# Patient Record
Sex: FEMALE | Race: WHITE | Employment: OTHER | URBAN - METROPOLITAN AREA
[De-identification: names, ages, dates, MRNs, and addresses within clinical notes are randomized per-mention and may not be internally consistent; named-entity substitution may affect disease eponyms.]

---

## 2017-01-09 ENCOUNTER — HOSPITAL ENCOUNTER (OUTPATIENT)
Dept: LAB | Age: 52
Discharge: HOME | End: 2017-01-09
Payer: COMMERCIAL

## 2017-01-09 LAB
ALBUMIN SERPL BCP-MCNC: 3.7 G/DL (ref 3.4–5)
ALBUMIN/GLOB SERPL: 1.3 {RATIO} (ref 0.8–1.7)
ALP SERPL-CCNC: 123 U/L (ref 45–117)
ALT SERPL-CCNC: 24 U/L (ref 13–56)
ANION GAP BLD CALC-SCNC: 8 MMOL/L (ref 3–18)
AST SERPL W P-5'-P-CCNC: 11 U/L (ref 15–37)
BILIRUB SERPL-MCNC: 0.4 MG/DL (ref 0.2–1)
BUN SERPL-MCNC: 11 MG/DL (ref 7–18)
BUN/CREAT SERPL: 17 (ref 12–20)
CALCIUM SERPL-MCNC: 8.8 MG/DL (ref 8.5–10.1)
CHLORIDE SERPL-SCNC: 105 MMOL/L (ref 100–108)
CO2 SERPL-SCNC: 27 MMOL/L (ref 21–32)
CREAT SERPL-MCNC: 0.64 MG/DL (ref 0.6–1.3)
ERYTHROCYTE [SEDIMENTATION RATE] IN BLOOD: 13 MM/HR (ref 0–20)
GLOBULIN SER CALC-MCNC: 2.8 G/DL (ref 2–4)
GLUCOSE SERPL-MCNC: 98 MG/DL (ref 74–99)
HBA1C MFR BLD: 6.3 % (ref 4.2–5.6)
POTASSIUM SERPL-SCNC: 4.1 MMOL/L (ref 3.5–5.5)
PROT SERPL-MCNC: 6.5 G/DL (ref 6.4–8.2)
SODIUM SERPL-SCNC: 140 MMOL/L (ref 136–145)

## 2017-01-09 PROCEDURE — 82977 ASSAY OF GGT: CPT | Performed by: FAMILY MEDICINE

## 2017-01-09 PROCEDURE — 86038 ANTINUCLEAR ANTIBODIES: CPT | Performed by: FAMILY MEDICINE

## 2017-01-09 PROCEDURE — 36415 COLL VENOUS BLD VENIPUNCTURE: CPT | Performed by: FAMILY MEDICINE

## 2017-01-09 PROCEDURE — 85652 RBC SED RATE AUTOMATED: CPT | Performed by: FAMILY MEDICINE

## 2017-01-09 PROCEDURE — 83036 HEMOGLOBIN GLYCOSYLATED A1C: CPT | Performed by: FAMILY MEDICINE

## 2017-01-09 PROCEDURE — 82306 VITAMIN D 25 HYDROXY: CPT | Performed by: FAMILY MEDICINE

## 2017-01-09 PROCEDURE — 80053 COMPREHEN METABOLIC PANEL: CPT | Performed by: FAMILY MEDICINE

## 2017-01-10 DIAGNOSIS — J45.909 UNCOMPLICATED ASTHMA: ICD-10-CM

## 2017-01-10 DIAGNOSIS — I10 ESSENTIAL HYPERTENSION: ICD-10-CM

## 2017-01-10 LAB
ANA SER QL: NEGATIVE
GGT SERPL-CCNC: 15 IU/L (ref 0–60)
SEE BELOW:, 164879: NORMAL

## 2017-01-10 RX ORDER — VALSARTAN 320 MG/1
320 TABLET ORAL DAILY
Qty: 90 TAB | Refills: 1 | Status: SHIPPED | OUTPATIENT
Start: 2017-01-10 | End: 2017-07-20 | Stop reason: SDUPTHER

## 2017-01-10 RX ORDER — MONTELUKAST SODIUM 10 MG/1
10 TABLET ORAL DAILY
Qty: 90 TAB | Refills: 3 | Status: SHIPPED | OUTPATIENT
Start: 2017-01-10 | End: 2017-12-21 | Stop reason: SDUPTHER

## 2017-01-10 NOTE — TELEPHONE ENCOUNTER
From: Violette Moreno  To:  Ladan Calhoun MD  Sent: 1/10/2017 5:44 AM EST  Subject: Medication Renewal Request    Original authorizing provider: MD Violette Lau would like a refill of the following medications:  montelukast (SINGULAIR) 10 mg tablet Ladan Calhoun MD]  valsartan (DIOVAN) 320 mg tablet Ladan Calhoun MD]    Preferred pharmacy: [address] [place] [address]    Comment:  Refill request to [address] [place] [address]

## 2017-01-11 LAB — 25(OH)D3 SERPL-MCNC: 14.4 NG/ML (ref 30–100)

## 2017-01-16 RX ORDER — ERGOCALCIFEROL 1.25 MG/1
50000 CAPSULE ORAL
Qty: 12 CAP | Refills: 3 | Status: SHIPPED | OUTPATIENT
Start: 2017-01-16 | End: 2017-12-21 | Stop reason: SDUPTHER

## 2017-01-16 NOTE — PROGRESS NOTES
+ vit d deficiency, erx 50k weekly. Prediabetes is persistent, cont to work on diet/wt loss. Inflammatory arthritis screen is neg. pls notify pt.  Will discuss in detail on next appt 2/20/17

## 2017-01-27 NOTE — PROGRESS NOTES
Patient identified with 2 identifiers (name and ). Patient aware of + vit d deficiency and Vit D 50k e scribed to pharmacy. Patient aware that the pre diabetes is persistent and patient was advised to work on diet and weight loss. Patein aware that inflammatory arthritis screen is negative.

## 2017-01-30 NOTE — TELEPHONE ENCOUNTER
This patient contacted office for the following prescriptions to be filled:    Medication requested :   Requested Prescriptions     Pending Prescriptions Disp Refills    amLODIPine (NORVASC) 5 mg tablet 90 Tab 1     Sig: Take 1 Tab by mouth daily.        PCP: perla  Pharmacy or Print: On Site   Mail order or Local pharmacy [address]    Scheduled appointment if not seen by current providers in office: LOV 11/8/2017 f/u 2/20/2017

## 2017-01-31 RX ORDER — AMLODIPINE BESYLATE 5 MG/1
5 TABLET ORAL DAILY
Qty: 90 TAB | Refills: 1 | Status: SHIPPED | OUTPATIENT
Start: 2017-01-31 | End: 2017-08-04 | Stop reason: SDUPTHER

## 2017-02-20 ENCOUNTER — OFFICE VISIT (OUTPATIENT)
Dept: FAMILY MEDICINE CLINIC | Age: 52
End: 2017-02-20

## 2017-02-20 VITALS
TEMPERATURE: 98.2 F | OXYGEN SATURATION: 98 % | HEART RATE: 70 BPM | WEIGHT: 293 LBS | BODY MASS INDEX: 44.41 KG/M2 | SYSTOLIC BLOOD PRESSURE: 134 MMHG | DIASTOLIC BLOOD PRESSURE: 86 MMHG | HEIGHT: 68 IN | RESPIRATION RATE: 16 BRPM

## 2017-02-20 DIAGNOSIS — E78.5 HYPERLIPIDEMIA WITH TARGET LDL LESS THAN 100: ICD-10-CM

## 2017-02-20 DIAGNOSIS — I10 ESSENTIAL HYPERTENSION: Primary | ICD-10-CM

## 2017-02-20 DIAGNOSIS — E55.9 VITAMIN D DEFICIENCY: ICD-10-CM

## 2017-02-20 DIAGNOSIS — F32.0 MILD SINGLE CURRENT EPISODE OF MAJOR DEPRESSIVE DISORDER (HCC): ICD-10-CM

## 2017-02-20 DIAGNOSIS — J45.20 MILD INTERMITTENT ASTHMA WITHOUT COMPLICATION: ICD-10-CM

## 2017-02-20 DIAGNOSIS — Z12.11 COLON CANCER SCREENING: ICD-10-CM

## 2017-02-20 RX ORDER — SERTRALINE HYDROCHLORIDE 100 MG/1
100 TABLET, FILM COATED ORAL DAILY
Qty: 90 TAB | Refills: 1 | Status: SHIPPED | OUTPATIENT
Start: 2017-02-20 | End: 2017-09-06 | Stop reason: SDUPTHER

## 2017-02-20 NOTE — MR AVS SNAPSHOT
Visit Information Date & Time Provider Department Dept. Phone Encounter #  
 2/20/2017  1:15 PM Noé Funez, [address] [id number] Follow-up Instructions Return in about 3 months (around 5/20/2017), or if symptoms worsen or fail to improve. Upcoming Health Maintenance Date Due  
 PAP AKA CERVICAL CYTOLOGY 7/1/2016 BREAST CANCER SCRN MAMMOGRAM 11/4/2016 FOBT Q 1 YEAR AGE 50-75 11/4/2016 DTaP/Tdap/Td series (2 - Td) 7/25/2026 Allergies as of 2/20/2017  Review Complete On: 2/20/2017 By: Noé Funez MD  
  
 Severity Noted Reaction Type Reactions Entex [Phenylephrine-guaifenesin]  08/10/2010    Unknown (comments) Simvastatin  03/20/2013    Myalgia Current Immunizations  Reviewed on 11/8/2016 Name Date Influenza Vaccine (Quad) PF 11/8/2016 Not reviewed this visit You Were Diagnosed With   
  
 Codes Comments Essential hypertension    -  Primary ICD-10-CM: I10 
ICD-9-CM: 401.9 Hyperlipidemia with target LDL less than 100     ICD-10-CM: E78.5 ICD-9-CM: 272.4 Mild single current episode of major depressive disorder (HCC)     ICD-10-CM: F32.0 ICD-9-CM: 296.21 Vitamin D deficiency     ICD-10-CM: E55.9 ICD-9-CM: 268.9 Mild intermittent asthma without complication     YGH-99-JY: J45.20 ICD-9-CM: 493.90 Colon cancer screening     ICD-10-CM: Z12.11 ICD-9-CM: V76.51 Vitals BP Pulse Temp Resp Height(growth percentile) Weight(growth percentile) 134/86 (BP 1 Location: Left arm, BP Patient Position: Sitting) 70 98.2 °F (36.8 °C) (Oral) 16 5' 8\" (1.727 m) 295 lb (133.8 kg) SpO2 BMI OB Status Smoking Status 98% 44.85 kg/m2 Having regular periods Never Smoker Vitals History BMI and BSA Data Body Mass Index Body Surface Area 44.85 kg/m 2 2.53 m 2 Preferred Pharmacy Pharmacy Name Phone ON-SITE  - [place], [address] [facility] [phone] Your Updated Medication List  
  
   
This list is accurate as of: 2/20/17  1:45 PM.  Always use your most recent med list.  
  
  
  
  
 albuterol 90 mcg/actuation inhaler Commonly known as:  PROVENTIL HFA, VENTOLIN HFA, PROAIR HFA Take 1-2 Puffs by inhalation every four (4) hours as needed for Wheezing or Shortness of Breath. amLODIPine 5 mg tablet Commonly known as:  Elspeth Bakes Take 1 Tab by mouth daily. B.infantis-B.ani-B.long-B.bifi 10-15 mg Tbec Take  by mouth. * cholecalciferol 1,000 unit tablet Commonly known as:  VITAMIN D3 Take  by mouth daily. * VITAMIN D3 1,000 unit Cap Generic drug:  cholecalciferol Take 1,000 Units by mouth daily. ergocalciferol 50,000 unit capsule Commonly known as:  ERGOCALCIFEROL Take 1 Cap by mouth every seven (7) days. fluticasone 50 mcg/actuation nasal spray Commonly known as:  Shelvia Birks 2 Sprays by Both Nostrils route daily. fluticasone-Salmeterol 45-21 mcg/actuation inhaler Commonly known as:  ADVAIR HFA Take 2 Puffs by inhalation two (2) times a day. ibuprofen 200 mg tablet Commonly known as:  MOTRIN Take  by mouth every six (6) hours as needed. Iron 325 mg (65 mg iron) tablet Generic drug:  ferrous sulfate Take 325 mg by mouth Three (3) times a week. montelukast 10 mg tablet Commonly known as:  SINGULAIR Take 1 Tab by mouth daily. MULTIVITAMIN PO Take 2 Tabs by mouth daily. omeprazole 20 mg capsule Commonly known as:  PRILOSEC Take 20 mg by mouth daily. POTASSIUM-CALCIUM-MAGNESIUM PO Take  by mouth. sertraline 100 mg tablet Commonly known as:  ZOLOFT Take 1 Tab by mouth daily. valsartan 320 mg tablet Commonly known as:  DIOVAN Take 1 Tab by mouth daily. VITAMIN B-12 SL  
by SubLINGual route. ZYRTEC PO Take  by mouth daily. * Notice: This list has 2 medication(s) that are the same as other medications prescribed for you. Read the directions carefully, and ask your doctor or other care provider to review them with you. Follow-up Instructions Return in about 3 months (around 5/20/2017), or if symptoms worsen or fail to improve. To-Do List   
 02/20/2017 Lab:  METABOLIC PANEL, COMPREHENSIVE   
  
 02/20/2017 Lab:  OCCULT BLOOD, IMMUNOASSAY (FIT)   
  
 02/20/2017 Lab:  VITAMIN D, 25 HYDROXY Patient Instructions DASH Diet: Care Instructions Your Care Instructions The DASH diet is an eating plan that can help lower your blood pressure. DASH stands for Dietary Approaches to Stop Hypertension. Hypertension is high blood pressure. The DASH diet focuses on eating foods that are high in calcium, potassium, and magnesium. These nutrients can lower blood pressure. The foods that are highest in these nutrients are fruits, vegetables, low-fat dairy products, nuts, seeds, and legumes. But taking calcium, potassium, and magnesium supplements instead of eating foods that are high in those nutrients does not have the same effect. The DASH diet also includes whole grains, fish, and poultry. The DASH diet is one of several lifestyle changes your doctor may recommend to lower your high blood pressure. Your doctor may also want you to decrease the amount of sodium in your diet. Lowering sodium while following the DASH diet can lower blood pressure even further than just the DASH diet alone. Follow-up care is a key part of your treatment and safety. Be sure to make and go to all appointments, and call your doctor if you are having problems. It's also a good idea to know your test results and keep a list of the medicines you take. How can you care for yourself at home? Following the DASH diet · Eat 4 to 5 servings of fruit each day.  A serving is 1 medium-sized piece of fruit, ½ cup chopped or canned fruit, 1/4 cup dried fruit, or 4 ounces (½ cup) of fruit juice. Choose fruit more often than fruit juice. · Eat 4 to 5 servings of vegetables each day. A serving is 1 cup of lettuce or raw leafy vegetables, ½ cup of chopped or cooked vegetables, or 4 ounces (½ cup) of vegetable juice. Choose vegetables more often than vegetable juice. · Get 2 to 3 servings of low-fat and fat-free dairy each day. A serving is 8 ounces of milk, 1 cup of yogurt, or 1 ½ ounces of cheese. · Eat 6 to 8 servings of grains each day. A serving is 1 slice of bread, 1 ounce of dry cereal, or ½ cup of cooked rice, pasta, or cooked cereal. Try to choose whole-grain products as much as possible. · Limit lean meat, poultry, and fish to 2 servings each day. A serving is 3 ounces, about the size of a deck of cards. · Eat 4 to 5 servings of nuts, seeds, and legumes (cooked dried beans, lentils, and split peas) each week. A serving is 1/3 cup of nuts, 2 tablespoons of seeds, or ½ cup of cooked beans or peas. · Limit fats and oils to 2 to 3 servings each day. A serving is 1 teaspoon of vegetable oil or 2 tablespoons of salad dressing. · Limit sweets and added sugars to 5 servings or less a week. A serving is 1 tablespoon jelly or jam, ½ cup sorbet, or 1 cup of lemonade. · Eat less than 2,300 milligrams (mg) of sodium a day. If you limit your sodium to 1,500 mg a day, you can lower your blood pressure even more. Tips for success · Start small. Do not try to make dramatic changes to your diet all at once. You might feel that you are missing out on your favorite foods and then be more likely to not follow the plan. Make small changes, and stick with them. Once those changes become habit, add a few more changes. · Try some of the following: ¨ Make it a goal to eat a fruit or vegetable at every meal and at snacks. This will make it easy to get the recommended amount of fruits and vegetables each day. ¨ Try yogurt topped with fruit and nuts for a snack or healthy dessert. ¨ Add lettuce, tomato, cucumber, and onion to sandwiches. ¨ Combine a ready-made pizza crust with low-fat mozzarella cheese and lots of vegetable toppings. Try using tomatoes, squash, spinach, broccoli, carrots, cauliflower, and onions. ¨ Have a variety of cut-up vegetables with a low-fat dip as an appetizer instead of chips and dip. ¨ Sprinkle sunflower seeds or chopped almonds over salads. Or try adding chopped walnuts or almonds to cooked vegetables. ¨ Try some vegetarian meals using beans and peas. Add garbanzo or kidney beans to salads. Make burritos and tacos with mashed ace beans or black beans. Where can you learn more? Go to http://loOverflow Cafejennie.info/. Enter T063 in the search box to learn more about \"DASH Diet: Care Instructions. \" Current as of: March 23, 2016 Content Version: 11.1 © 8676-1998 [employer]. Care instructions adapted under license by [employer] (which disclaims liability or warranty for this information). If you have questions about a medical condition or this instruction, always ask your healthcare professional. [address] any warranty or liability for your use of this information. Introducing [facility]! Dear Frida Licea: Thank you for requesting a MyCadbox account. Our records indicate that you already have an active MyCadbox account. You can access your account anytime at [email or web address] Did you know that you can access your hospital and ER discharge instructions at any time in MyCadbox? You can also review all of your test results from your hospital stay or ER visit. Additional Information If you have questions, please visit the Frequently Asked Questions section of the MyCadbox website at [email or web address] Remember, MyCadbox is NOT to be used for urgent needs.  For medical emergencies, dial 911. Now available from your iPhone and Android! Please provide this summary of care documentation to your next provider. Your primary care clinician is listed as Ludin Fink. If you have any questions after today's visit, please call [phone].

## 2017-02-20 NOTE — PROGRESS NOTES
Chief Complaint   Patient presents with    Hypertension    Cholesterol Problem    Depression     1. Have you been to the ER, urgent care clinic since your last visit? Hospitalized since your last visit? No    2. Have you seen or consulted any other health care providers outside of the [address] [place] [address] since your last visit? Include any pap smears or colon screening.  No

## 2017-02-20 NOTE — PROGRESS NOTES
Raad Crane, 49 y.o.,  female    SUBJECTIVE  Ff-up      Depression- says taking 100 mg zoloft qday  and says feels good/stable on this dose. HTN- on norvasc/diovan without problems    Dysphagia- did not follow up with GLS, says this has improved. She has h/o GERD on PPI, EGD Reviewed essentially normal, bx neg. Continues to see GLS. asthma is doing well on advair, hardly uses her albuterol. On singulair and zyrtec as well for allergies. Per pt utd with gyne care with dr. Bill Blount, due for mammogram already ordered by gyne scheduled for this week Friday 2/24/17    H/o  +HL, tried simvastatin and red yeast rice previously, difficulty taking these meds. Reviewed labs vit d def, she is currently on 50k weekly replacement/prediabetes      ROS:  See HPI, all others negative        Patient Active Problem List   Diagnosis Code    HTN (hypertension) I10    Hyperlipidemia with target LDL less than 100 E78.5    Allergic rhinitis J30.9    Depression F32.9    S/P gastric bypass Z98.84    Anxiety F41.9    Iron deficiency E61.1    Vitamin D deficiency E55.9    Irregular menses N92.6    Vertigo R42    Onychomycosis B35.1    Impaired glucose tolerance R73.02    Rosacea L71.9    Gastroesophageal reflux disease without esophagitis K21.9    Need for Tdap vaccination Z23    Asthma J45.909       Current Outpatient Prescriptions   Medication Sig Dispense Refill    amLODIPine (NORVASC) 5 mg tablet Take 1 Tab by mouth daily. 90 Tab 1    ergocalciferol (ERGOCALCIFEROL) 50,000 unit capsule Take 1 Cap by mouth every seven (7) days. 12 Cap 3    montelukast (SINGULAIR) 10 mg tablet Take 1 Tab by mouth daily. 90 Tab 3    valsartan (DIOVAN) 320 mg tablet Take 1 Tab by mouth daily. 90 Tab 1    ferrous sulfate (IRON) 325 mg (65 mg iron) tablet Take 325 mg by mouth Three (3) times a week.  POTASSIUM-CALCIUM-MAGNESIUM PO Take  by mouth.       fluticasone-Salmeterol (ADVAIR HFA) 45-21 mcg/actuation inhaler Take 2 Puffs by inhalation two (2) times a day. 3 Inhaler 1    B.infantis-B.ani-B.long-B.bifi 10-15 mg TbEC Take  by mouth.  sertraline (ZOLOFT) 100 mg tablet Take 1 Tab by mouth daily. 90 Tab 1    omeprazole (PRILOSEC) 20 mg capsule Take 20 mg by mouth daily.  albuterol (PROVENTIL HFA, VENTOLIN HFA, PROAIR HFA) 90 mcg/actuation inhaler Take 1-2 Puffs by inhalation every four (4) hours as needed for Wheezing or Shortness of Breath. 3 Inhaler 3    fluticasone (FLONASE) 50 mcg/actuation nasal spray 2 Sprays by Both Nostrils route daily. 3 Bottle 3    CETIRIZINE HCL (ZYRTEC PO) Take  by mouth daily.  ibuprofen (MOTRIN) 200 mg tablet Take  by mouth every six (6) hours as needed.  MULTIVITAMIN PO Take 2 Tabs by mouth daily.  CYANOCOBALAMIN (VITAMIN B-12 SL) by SubLINGual route.  cholecalciferol (VITAMIN D3) 1,000 unit cap Take 1,000 Units by mouth daily.  cholecalciferol (VITAMIN D3) 1,000 unit tablet Take  by mouth daily. Allergies   Allergen Reactions    Entex [Phenylephrine-Guaifenesin] Unknown (comments)    Simvastatin Myalgia       Past Medical History   Diagnosis Date    Asthma     Depression      Anxiety    GERD (gastroesophageal reflux disease)     Hypercholesterolemia     Hypertension        Social History     Social History    Marital status:      Spouse name: N/A    Number of children: N/A    Years of education: N/A     Occupational History    Not on file.      Social History Main Topics    Smoking status: Never Smoker    Smokeless tobacco: Never Used    Alcohol use Yes      Comment: 2 drinks per month    Drug use: No    Sexual activity: Not Currently     Partners: Male     Other Topics Concern    Not on file     Social History Narrative       Family History   Problem Relation Age of Onset    Asthma Mother     High Cholesterol Mother     Hypertension Mother     Thyroid Disease Mother     Cancer Mother 79     Thyroid Cancer    Depression Father     High Cholesterol Father     Hypertension Father     Colon Polyps Father     Depression Brother     High Cholesterol Brother     Hypertension Brother     Breast Cancer Maternal Grandmother     High Cholesterol Maternal Grandmother     Hypertension Maternal Grandmother     Diabetes Maternal Grandmother     Cancer Maternal Grandfather      prostate    High Cholesterol Maternal Grandfather     Hypertension Maternal Grandfather     Diabetes Maternal Grandfather     High Cholesterol Paternal Grandmother     Hypertension Paternal Grandmother     Diabetes Paternal Grandmother     Thyroid Disease Paternal Grandmother     High Cholesterol Paternal Grandfather     Hypertension Paternal Grandfather     Diabetes Paternal Grandfather          OBJECTIVE    Physical Exam:     Visit Vitals    /86 (BP 1 Location: Left arm, BP Patient Position: Sitting)    Pulse 70    Temp 98.2 °F (36.8 °C) (Oral)    Resp 16    Ht 5' 8\" (1.727 m)    Wt 295 lb (133.8 kg)    SpO2 98%    BMI 44.85 kg/m2       General: alert, obese,  in no apparent distress or pain  HEENT: throat, pharynx normal, nasal mucosa, TMs normal.   CVS: normal rate, regular rhythm, distinct S1 and S2  Lungs:clear to ausculation bilaterally, no crackles, wheezing or rhonchi noted  Skin: warm, no lesions, rashes noted  Psych:  mood and affect normal        ASSESSMENT/PLAN  Johana Salgado was seen today for other, hypertension, anxiety and depression. Diagnoses and all orders for this visit:    Essential hypertension-  Controlled, cont both diovan and norvasc.        Hyperlipidemia  SE with previous statins, CV risk cacluated at 2.6%   Discussed low fat diet      S/P gastric bypass       vit d def  On replacement  Recheck vit d in 3 months    Depression  Stable, cont 100 mg zoloft qd    Allergic rhinitis, unspecified allergic rhinitis type  Cont singulair/zyrtec    Gastroesophageal reflux disease without esophagitis-  Stable, Cont PPI    Asthma, mild intermittent, uncomplicated  Controlled, cont advair, prn albuterol  Declines PCV vaccine, annual flu vaccine UTD    Colon ca screening  Discussed options, prefers FIT test, ordered today    Prediabetes  TLCs, monitoring      Follow-up Disposition:  Return in about 3 months (around 5/20/2017), or if symptoms worsen or fail to improve. Patient understands plan of care. Patient has provided input and agrees with goals.

## 2017-02-20 NOTE — PATIENT INSTRUCTIONS

## 2017-05-23 RX ORDER — FLUTICASONE PROPIONATE 50 MCG
2 SPRAY, SUSPENSION (ML) NASAL DAILY
Qty: 3 BOTTLE | Refills: 3 | Status: SHIPPED | OUTPATIENT
Start: 2017-05-23 | End: 2018-07-16 | Stop reason: SDUPTHER

## 2017-05-23 NOTE — TELEPHONE ENCOUNTER
This pharmacy faxed over request for the following prescriptions to be filled:    Medication requested :   Requested Prescriptions     Pending Prescriptions Disp Refills    fluticasone (FLONASE) 50 mcg/actuation nasal spray 3 Bottle 3     Si Sprays by Both Nostrils route daily. PCP: Doris Barrientos or Print: On-Site   Mail order or Local pharmacy [address] [facility],   [address] [facility]    Scheduled appointment if not seen by current providers in office: LOV 2017 No f/u up Scheduled at this time. LMOV to schedule a f/u .  Due 2017

## 2017-07-06 ENCOUNTER — HOSPITAL ENCOUNTER (OUTPATIENT)
Dept: LAB | Age: 52
Discharge: HOME | End: 2017-07-06
Payer: COMMERCIAL

## 2017-07-06 LAB
25(OH)D3 SERPL-MCNC: 26.6 NG/ML (ref 30–100)
ALBUMIN SERPL BCP-MCNC: 3.6 G/DL (ref 3.4–5)
ALBUMIN/GLOB SERPL: 1.4 {RATIO} (ref 0.8–1.7)
ALP SERPL-CCNC: 117 U/L (ref 45–117)
ALT SERPL-CCNC: 35 U/L (ref 13–56)
ANION GAP BLD CALC-SCNC: 8 MMOL/L (ref 3–18)
AST SERPL W P-5'-P-CCNC: 16 U/L (ref 15–37)
BILIRUB SERPL-MCNC: 0.5 MG/DL (ref 0.2–1)
BUN SERPL-MCNC: 16 MG/DL (ref 7–18)
BUN/CREAT SERPL: 25 (ref 12–20)
CALCIUM SERPL-MCNC: 8.8 MG/DL (ref 8.5–10.1)
CHLORIDE SERPL-SCNC: 106 MMOL/L (ref 100–108)
CHOLEST SERPL-MCNC: 254 MG/DL
CO2 SERPL-SCNC: 27 MMOL/L (ref 21–32)
CREAT SERPL-MCNC: 0.65 MG/DL (ref 0.6–1.3)
GLOBULIN SER CALC-MCNC: 2.6 G/DL (ref 2–4)
GLUCOSE SERPL-MCNC: 103 MG/DL (ref 74–99)
HDLC SERPL-MCNC: 47 MG/DL (ref 40–60)
HDLC SERPL: 5.4 {RATIO} (ref 0–5)
LDLC SERPL CALC-MCNC: 159.4 MG/DL (ref 0–100)
LIPID PROFILE,FLP: ABNORMAL
POTASSIUM SERPL-SCNC: 4.1 MMOL/L (ref 3.5–5.5)
PROT SERPL-MCNC: 6.2 G/DL (ref 6.4–8.2)
SODIUM SERPL-SCNC: 141 MMOL/L (ref 136–145)
TRIGL SERPL-MCNC: 238 MG/DL (ref ?–150)
VLDLC SERPL CALC-MCNC: 47.6 MG/DL

## 2017-07-06 PROCEDURE — 80053 COMPREHEN METABOLIC PANEL: CPT | Performed by: FAMILY MEDICINE

## 2017-07-06 PROCEDURE — 80061 LIPID PANEL: CPT | Performed by: FAMILY MEDICINE

## 2017-07-06 PROCEDURE — 36415 COLL VENOUS BLD VENIPUNCTURE: CPT | Performed by: FAMILY MEDICINE

## 2017-07-06 PROCEDURE — 82306 VITAMIN D 25 HYDROXY: CPT | Performed by: FAMILY MEDICINE

## 2017-07-20 ENCOUNTER — OFFICE VISIT (OUTPATIENT)
Dept: FAMILY MEDICINE CLINIC | Age: 52
End: 2017-07-20

## 2017-07-20 VITALS
HEIGHT: 68 IN | TEMPERATURE: 97.7 F | RESPIRATION RATE: 16 BRPM | OXYGEN SATURATION: 97 % | HEART RATE: 71 BPM | DIASTOLIC BLOOD PRESSURE: 86 MMHG | SYSTOLIC BLOOD PRESSURE: 122 MMHG | BODY MASS INDEX: 44.41 KG/M2 | WEIGHT: 293 LBS

## 2017-07-20 DIAGNOSIS — R73.02 IMPAIRED GLUCOSE TOLERANCE: ICD-10-CM

## 2017-07-20 DIAGNOSIS — Z98.84 S/P GASTRIC BYPASS: ICD-10-CM

## 2017-07-20 DIAGNOSIS — F32.0 MILD SINGLE CURRENT EPISODE OF MAJOR DEPRESSIVE DISORDER (HCC): Primary | ICD-10-CM

## 2017-07-20 DIAGNOSIS — E55.9 VITAMIN D DEFICIENCY: ICD-10-CM

## 2017-07-20 DIAGNOSIS — J45.20 MILD INTERMITTENT ASTHMA WITHOUT COMPLICATION: ICD-10-CM

## 2017-07-20 DIAGNOSIS — Z12.11 COLON CANCER SCREENING: ICD-10-CM

## 2017-07-20 DIAGNOSIS — Z83.49 FAMILY HISTORY OF THYROID DISEASE: ICD-10-CM

## 2017-07-20 DIAGNOSIS — I10 ESSENTIAL HYPERTENSION: ICD-10-CM

## 2017-07-20 DIAGNOSIS — E78.9 BORDERLINE HIGH CHOLESTEROL: ICD-10-CM

## 2017-07-20 RX ORDER — VALSARTAN 320 MG/1
320 TABLET ORAL DAILY
Qty: 90 TAB | Refills: 1 | Status: SHIPPED | OUTPATIENT
Start: 2017-07-20 | End: 2018-01-10 | Stop reason: SDUPTHER

## 2017-07-20 NOTE — MR AVS SNAPSHOT
Visit Information Date & Time Provider Department Dept. Phone Encounter #  
 7/20/2017  8:45 AM Jonny Mullins, [address] [id number] Follow-up Instructions Return in about 5 months (around 12/20/2017), or if symptoms worsen or fail to improve. Upcoming Health Maintenance Date Due  
 PAP AKA CERVICAL CYTOLOGY 7/1/2016 FOBT Q 1 YEAR AGE 50-75 11/4/2016 INFLUENZA AGE 9 TO ADULT 8/1/2017 BREAST CANCER SCRN MAMMOGRAM 2/24/2019 DTaP/Tdap/Td series (2 - Td) 7/25/2026 Allergies as of 7/20/2017  Review Complete On: 7/20/2017 By: Jonny Mullins MD  
  
 Severity Noted Reaction Type Reactions Entex [Phenylephrine-guaifenesin]  08/10/2010    Unknown (comments) Simvastatin  03/20/2013    Myalgia Current Immunizations  Reviewed on 11/8/2016 Name Date Influenza Vaccine (Quad) PF 11/8/2016 Not reviewed this visit You Were Diagnosed With   
  
 Codes Comments Mild single current episode of major depressive disorder [facility]    -  Primary ICD-10-CM: F32.0 ICD-9-CM: 296.21 Vitamin D deficiency     ICD-10-CM: E55.9 ICD-9-CM: 268.9 Mild intermittent asthma without complication     SZS-67-EY: J45.20 ICD-9-CM: 493.90 S/P gastric bypass     ICD-10-CM: G63.79 ICD-9-CM: V45.86 Impaired glucose tolerance     ICD-10-CM: R73.02 
ICD-9-CM: 790.22 Essential hypertension     ICD-10-CM: I10 
ICD-9-CM: 401.9 Borderline high cholesterol     ICD-10-CM: E78.9 ICD-9-CM: 272.9 Colon cancer screening     ICD-10-CM: Z12.11 ICD-9-CM: V76.51 Family history of thyroid disease     ICD-10-CM: Z83.49 
ICD-9-CM: V18.19 Vitals BP Pulse Temp Resp Height(growth percentile) Weight(growth percentile) 122/86 (BP 1 Location: Left arm, BP Patient Position: Sitting) 71 97.7 °F (36.5 °C) (Oral) 16 5' 8\" (1.727 m) 302 lb (137 kg) SpO2 BMI OB Status Smoking Status 97% 45.92 kg/m2 Having regular periods Never Smoker BMI and BSA Data Body Mass Index Body Surface Area 45.92 kg/m 2 2.56 m 2 Preferred Pharmacy Pharmacy Name Phone [address] [facility] [place] [phone] Your Updated Medication List  
  
   
This list is accurate as of: 7/20/17  9:17 AM.  Always use your most recent med list.  
  
  
  
  
 albuterol 90 mcg/actuation inhaler Commonly known as:  PROVENTIL HFA, VENTOLIN HFA, PROAIR HFA Take 1-2 Puffs by inhalation every four (4) hours as needed for Wheezing or Shortness of Breath. amLODIPine 5 mg tablet Commonly known as:  Delcie Ocampo Take 1 Tab by mouth daily. B.infantis-B.ani-B.long-B.bifi 10-15 mg Tbec Take  by mouth. * cholecalciferol 1,000 unit tablet Commonly known as:  VITAMIN D3 Take  by mouth daily. * VITAMIN D3 1,000 unit Cap Generic drug:  cholecalciferol Take 1,000 Units by mouth daily. ergocalciferol 50,000 unit capsule Commonly known as:  ERGOCALCIFEROL Take 1 Cap by mouth every seven (7) days. fluticasone 50 mcg/actuation nasal spray Commonly known as:  Bhavya Webber 2 Sprays by Both Nostrils route daily. fluticasone-Salmeterol 45-21 mcg/actuation inhaler Commonly known as:  ADVAIR HFA Take 2 Puffs by inhalation two (2) times a day. ibuprofen 200 mg tablet Commonly known as:  MOTRIN Take  by mouth every six (6) hours as needed. Iron 325 mg (65 mg iron) tablet Generic drug:  ferrous sulfate Take 325 mg by mouth Three (3) times a week. montelukast 10 mg tablet Commonly known as:  SINGULAIR Take 1 Tab by mouth daily. MULTIVITAMIN PO Take 2 Tabs by mouth daily. omeprazole 20 mg capsule Commonly known as:  PRILOSEC Take 20 mg by mouth daily. POTASSIUM-CALCIUM-MAGNESIUM PO Take  by mouth. sertraline 100 mg tablet Commonly known as:  ZOLOFT Take 1 Tab by mouth daily. valsartan 320 mg tablet Commonly known as:  DIOVAN Take 1 Tab by mouth daily. VITAMIN B-12 SL  
by SubLINGual route. ZYRTEC PO Take  by mouth daily. * Notice: This list has 2 medication(s) that are the same as other medications prescribed for you. Read the directions carefully, and ask your doctor or other care provider to review them with you. Follow-up Instructions Return in about 5 months (around 12/20/2017), or if symptoms worsen or fail to improve. To-Do List   
 07/20/2017 Lab:  OCCULT BLOOD, IMMUNOASSAY (FIT)   
  
 07/20/2017 Lab:  TSH 3RD GENERATION Patient Instructions Prediabetes: Care Instructions Your Care Instructions Prediabetes is a warning sign that you are at risk for getting type 2 diabetes. It means that your blood sugar is higher than it should be. The food you eat turns into sugar, which your body uses for energy. Normally, an organ called the pancreas makes insulin, which allows the sugar in your blood to get into your body's cells. But when your body can't use insulin the right way, the sugar doesn't move into cells. It stays in your blood instead. This is called insulin resistance. The buildup of sugar in the blood causes prediabetes. The good news is that lifestyle changes may help you get your blood sugar back to normal and help you avoid or delay diabetes. Follow-up care is a key part of your treatment and safety. Be sure to make and go to all appointments, and call your doctor if you are having problems. It's also a good idea to know your test results and keep a list of the medicines you take. How can you care for yourself at home? · Watch your weight. A healthy weight helps your body use insulin properly. · Limit the amount of calories, sweets, and unhealthy fat you eat. Ask your doctor if you should see a dietitian. A registered dietitian can help you create meal plans that fit your lifestyle. · Get at least 30 minutes of exercise on most days of the week. Exercise helps control your blood sugar. It also helps you maintain a healthy weight. Walking is a good choice. You also may want to do other activities, such as running, swimming, cycling, or playing tennis or team sports. · Do not smoke. Smoking can make prediabetes worse. If you need help quitting, talk to your doctor about stop-smoking programs and medicines. These can increase your chances of quitting for good. · If your doctor prescribed medicines, take them exactly as prescribed. Call your doctor if you think you are having a problem with your medicine. You will get more details on the specific medicines your doctor prescribes. When should you call for help? Watch closely for changes in your health, and be sure to contact your doctor if: 
· You have any symptoms of diabetes. These may include: ¨ Being thirsty more often. ¨ Urinating more. ¨ Being hungrier. ¨ Losing weight. ¨ Being very tired. ¨ Having blurry vision. · You have a wound that will not heal. 
· You have an infection that will not go away. · You have problems with your blood pressure. · You want more information about diabetes and how you can keep from getting it. Where can you learn more? Go to http://lo-jennie.info/. Enter I222 in the search box to learn more about \"Prediabetes: Care Instructions. \" Current as of: March 13, 2017 Content Version: 11.3 © 7149-5092 [employer]. Care instructions adapted under license by [employer] (which disclaims liability or warranty for this information). If you have questions about a medical condition or this instruction, always ask your healthcare professional. [address] any warranty or liability for your use of this information. Introducing [facility]! Dear Helena Rodriguez: Thank you for requesting a Weekend-a-gogo account.   Our records indicate that you already have an active PromoteU account. You can access your account anytime at [email or web address] Did you know that you can access your hospital and ER discharge instructions at any time in PromoteU? You can also review all of your test results from your hospital stay or ER visit. Additional Information If you have questions, please visit the Frequently Asked Questions section of the PromoteU website at [email or web address] Remember, PromoteU is NOT to be used for urgent needs. For medical emergencies, dial 911. Now available from your iPhone and Android! Please provide this summary of care documentation to your next provider. Your primary care clinician is listed as Maricel Magallon. If you have any questions after today's visit, please call [phone].

## 2017-07-20 NOTE — PROGRESS NOTES
Chief Complaint   Patient presents with    Hypertension    Cholesterol Problem    Depression    Vitamin D Deficiency    Results     1. Have you been to the ER, urgent care clinic since your last visit? Hospitalized since your last visit? Yes When: 2 weeks ago Where: Patient [facility]ene [facility] Reason for visit: Ankle injury    2. Have you seen or consulted any other health care providers outside of the [facility] since your last visit? Include any pap smears or colon screening.  No

## 2017-07-20 NOTE — PATIENT INSTRUCTIONS
Prediabetes: Care Instructions  Your Care Instructions  Prediabetes is a warning sign that you are at risk for getting type 2 diabetes. It means that your blood sugar is higher than it should be. The food you eat turns into sugar, which your body uses for energy. Normally, an organ called the pancreas makes insulin, which allows the sugar in your blood to get into your body's cells. But when your body can't use insulin the right way, the sugar doesn't move into cells. It stays in your blood instead. This is called insulin resistance. The buildup of sugar in the blood causes prediabetes. The good news is that lifestyle changes may help you get your blood sugar back to normal and help you avoid or delay diabetes. Follow-up care is a key part of your treatment and safety. Be sure to make and go to all appointments, and call your doctor if you are having problems. It's also a good idea to know your test results and keep a list of the medicines you take. How can you care for yourself at home? · Watch your weight. A healthy weight helps your body use insulin properly. · Limit the amount of calories, sweets, and unhealthy fat you eat. Ask your doctor if you should see a dietitian. A registered dietitian can help you create meal plans that fit your lifestyle. · Get at least 30 minutes of exercise on most days of the week. Exercise helps control your blood sugar. It also helps you maintain a healthy weight. Walking is a good choice. You also may want to do other activities, such as running, swimming, cycling, or playing tennis or team sports. · Do not smoke. Smoking can make prediabetes worse. If you need help quitting, talk to your doctor about stop-smoking programs and medicines. These can increase your chances of quitting for good. · If your doctor prescribed medicines, take them exactly as prescribed. Call your doctor if you think you are having a problem with your medicine.  You will get more details on the specific medicines your doctor prescribes. When should you call for help? Watch closely for changes in your health, and be sure to contact your doctor if:  · You have any symptoms of diabetes. These may include:  ¨ Being thirsty more often. ¨ Urinating more. ¨ Being hungrier. ¨ Losing weight. ¨ Being very tired. ¨ Having blurry vision. · You have a wound that will not heal.  · You have an infection that will not go away. · You have problems with your blood pressure. · You want more information about diabetes and how you can keep from getting it. Where can you learn more? Go to http://lo-jennie.info/. Enter I222 in the search box to learn more about \"Prediabetes: Care Instructions. \"  Current as of: March 13, 2017  Content Version: 11.3  © 8551-4861 [employer]. Care instructions adapted under license by [employer] (which disclaims liability or warranty for this information). If you have questions about a medical condition or this instruction, always ask your healthcare professional. [address] any warranty or liability for your use of this information.

## 2017-07-20 NOTE — PROGRESS NOTES
Everton De Leon, 49 y.o.,  female    SUBJECTIVE  Ff-up    Depression- says taking 100 mg zoloft qday. Wondering if she should increase dose, struggling with child with similar issues, recently placed on zoloft as well. She would want to monitor how he responds to this medication first and see if relationship improves. HTN- on norvasc/diovan without problems. Requesting refills. Reviewed labs    GERD- doing well on prilosec. Says needs another FIT test kit for CRCS, dropped hers in toilet. asthma is doing well on advair, hardly uses her albuterol. On singulair and zyrtec as well for allergies. Per pt utd with gyne care with dr. Jennifer Velásquez    H/o  +HL, tried simvastatin and red yeast rice previously, difficulty taking these meds. Vit d def- she is s/p gastric bypass    She wants thyroid checked, says several family members with hashimotos. She has been struggling with weight loss. Tried exercising, sprained ankle. ROS:  See HPI, all others negative        Patient Active Problem List   Diagnosis Code    HTN (hypertension) I10    Hyperlipidemia with target LDL less than 100 E78.5    Allergic rhinitis J30.9    Depression F32.9    S/P gastric bypass Z98.84    Anxiety F41.9    Iron deficiency E61.1    Vitamin D deficiency E55.9    Irregular menses N92.6    Vertigo R42    Onychomycosis B35.1    Impaired glucose tolerance R73.02    Rosacea L71.9    Gastroesophageal reflux disease without esophagitis K21.9    Need for Tdap vaccination Z23    Asthma J45.909       Current Outpatient Prescriptions   Medication Sig Dispense Refill    valsartan (DIOVAN) 320 mg tablet Take 1 Tab by mouth daily. 90 Tab 1    fluticasone (FLONASE) 50 mcg/actuation nasal spray 2 Sprays by Both Nostrils route daily. 3 Bottle 3    sertraline (ZOLOFT) 100 mg tablet Take 1 Tab by mouth daily. 90 Tab 1    amLODIPine (NORVASC) 5 mg tablet Take 1 Tab by mouth daily.  90 Tab 1    ergocalciferol (ERGOCALCIFEROL) 50,000 unit capsule Take 1 Cap by mouth every seven (7) days. 12 Cap 3    montelukast (SINGULAIR) 10 mg tablet Take 1 Tab by mouth daily. 90 Tab 3    POTASSIUM-CALCIUM-MAGNESIUM PO Take  by mouth.  fluticasone-Salmeterol (ADVAIR HFA) 45-21 mcg/actuation inhaler Take 2 Puffs by inhalation two (2) times a day. 3 Inhaler 1    B.infantis-B.ani-B.long-B.bifi 10-15 mg TbEC Take  by mouth.  omeprazole (PRILOSEC) 20 mg capsule Take 20 mg by mouth daily.  albuterol (PROVENTIL HFA, VENTOLIN HFA, PROAIR HFA) 90 mcg/actuation inhaler Take 1-2 Puffs by inhalation every four (4) hours as needed for Wheezing or Shortness of Breath. 3 Inhaler 3    CETIRIZINE HCL (ZYRTEC PO) Take  by mouth daily.  ibuprofen (MOTRIN) 200 mg tablet Take  by mouth every six (6) hours as needed.  MULTIVITAMIN PO Take 2 Tabs by mouth daily.  CYANOCOBALAMIN (VITAMIN B-12 SL) by SubLINGual route.  cholecalciferol (VITAMIN D3) 1,000 unit cap Take 1,000 Units by mouth daily.  ferrous sulfate (IRON) 325 mg (65 mg iron) tablet Take 325 mg by mouth Three (3) times a week.  cholecalciferol (VITAMIN D3) 1,000 unit tablet Take  by mouth daily. Allergies   Allergen Reactions    Entex [Phenylephrine-Guaifenesin] Unknown (comments)    Simvastatin Myalgia       Past Medical History:   Diagnosis Date    Asthma     Depression     Anxiety    GERD (gastroesophageal reflux disease)     Hypercholesterolemia     Hypertension        Social History     Social History    Marital status:      Spouse name: N/A    Number of children: N/A    Years of education: N/A     Occupational History    Not on file.      Social History Main Topics    Smoking status: Never Smoker    Smokeless tobacco: Never Used    Alcohol use Yes      Comment: 2 drinks per month    Drug use: No    Sexual activity: Not Currently     Partners: Male     Other Topics Concern    Not on file     Social History Narrative       Family History Problem Relation Age of Onset    Asthma Mother     High Cholesterol Mother     Hypertension Mother     Thyroid Disease Mother     Cancer Mother 79     Thyroid Cancer    Depression Father     High Cholesterol Father     Hypertension Father     Colon Polyps Father     Depression Brother     High Cholesterol Brother     Hypertension Brother     Breast Cancer Maternal Grandmother     High Cholesterol Maternal Grandmother     Hypertension Maternal Grandmother     Diabetes Maternal Grandmother     Cancer Maternal Grandfather      prostate    High Cholesterol Maternal Grandfather     Hypertension Maternal Grandfather     Diabetes Maternal Grandfather     High Cholesterol Paternal Grandmother     Hypertension Paternal Grandmother     Diabetes Paternal Grandmother     Thyroid Disease Paternal Grandmother     High Cholesterol Paternal Grandfather     Hypertension Paternal Grandfather     Diabetes Paternal Grandfather          OBJECTIVE    Physical Exam:     Visit Vitals    /86 (BP 1 Location: Left arm, BP Patient Position: Sitting)    Pulse 71    Temp 97.7 °F (36.5 °C) (Oral)    Resp 16    Ht 5' 8\" (1.727 m)    Wt 302 lb (137 kg)    SpO2 97%    BMI 45.92 kg/m2       General: alert, obese,  in no apparent distress or pain  HEENT: throat, pharynx normal, nasal mucosa, TMs normal.   Neck: no palpable cyst or enlargement. CVS: normal rate, regular rhythm, distinct S1 and S2  Lungs:clear to ausculation bilaterally, no crackles, wheezing or rhonchi noted  Skin: warm, no lesions, rashes noted  Psych:  mood and affect normal        ASSESSMENT/PLAN  Pauly Hilton was seen today for other, hypertension, anxiety and depression. Diagnoses and all orders for this visit:    Essential hypertension-  Controlled, cont both diovan and norvasc.        Hyperlipidemia  SE with previous statins, CV risk cacluated at 2.3%   Discussed low fat diet, wt loss strategies, non weight bearing exercises like stationary bike/elliptical/swimming      S/P gastric bypass     vit d def  Improved, cont 1000 iu od after she completed 50k weekly    Depression  Fairly Stable, cont 100 mg zoloft qd  Will monitor, consider increasing to 200 mg qday  If still symptomatic    Allergic rhinitis, unspecified allergic rhinitis type  Cont singulair/zyrtec    Gastroesophageal reflux disease without esophagitis-  Stable, Cont PPI    Asthma, mild intermittent, uncomplicated  Controlled, cont advair, prn albuterol  Declines PCV vaccine, annual flu vaccine UTD    Colon ca screening  Discussed options, given another FIT test    Prediabetes   persistent, TLCs, monitoring    Family history of thyroid disease  Check TSH    Follow-up Disposition:  Return in about 5 months (around 12/20/2017), or if symptoms worsen or fail to improve. Patient understands plan of care. Patient has provided input and agrees with goals.

## 2017-08-04 RX ORDER — AMLODIPINE BESYLATE 5 MG/1
5 TABLET ORAL DAILY
Qty: 90 TAB | Refills: 0 | Status: SHIPPED | OUTPATIENT
Start: 2017-08-04 | End: 2017-11-01 | Stop reason: SDUPTHER

## 2017-08-04 NOTE — TELEPHONE ENCOUNTER
This pharmacy faxed over request for the following prescriptions to be filled:    Medication requested :   Requested Prescriptions     Pending Prescriptions Disp Refills    amLODIPine (NORVASC) 5 mg tablet 90 Tab 1     Sig: Take 1 Tab by mouth daily.      PCP: [address] or Print: [facility]  Mail order or Local pharmacy: [phone]    Scheduled appointment if not seen by current providers in office: LOV: 7/20/17, No followup

## 2017-09-06 RX ORDER — SERTRALINE HYDROCHLORIDE 100 MG/1
100 TABLET, FILM COATED ORAL DAILY
Qty: 90 TAB | Refills: 0 | Status: SHIPPED | OUTPATIENT
Start: 2017-09-06 | End: 2017-12-05 | Stop reason: SDUPTHER

## 2017-09-06 NOTE — TELEPHONE ENCOUNTER
This pharmacy faxed over request for the following prescriptions to be filled:    Medication requested :   Requested Prescriptions     Pending Prescriptions Disp Refills    sertraline (ZOLOFT) 100 mg tablet 90 Tab 1     Sig: Take 1 Tab by mouth daily.      PCP: [address] or Print: [facility]  Mail order or Local pharmacy: [phone]    Scheduled appointment if not seen by current providers in office: LOV: 7/20/17, No followup

## 2017-11-01 NOTE — TELEPHONE ENCOUNTER
This patient contacted office for the following prescriptions to be filled:    Medication requested :   Requested Prescriptions     Pending Prescriptions Disp Refills    amLODIPine (NORVASC) 5 mg tablet 90 Tab 0     Sig: Take 1 Tab by mouth daily.  albuterol (PROVENTIL HFA, VENTOLIN HFA, PROAIR HFA) 90 mcg/actuation inhaler 3 Inhaler 3     Sig: Take 1-2 Puffs by inhalation every four (4) hours as needed for Wheezing or Shortness of Breath.      PCP: [address] or Print: [facility]  Mail order or Local pharmacy: [phone]  Scheduled appointment if not seen by current providers in office: ABQ:[birth year], Next appt: 12/21/17

## 2017-11-02 RX ORDER — ALBUTEROL SULFATE 90 UG/1
1-2 AEROSOL, METERED RESPIRATORY (INHALATION)
Qty: 3 INHALER | Refills: 3 | Status: SHIPPED | OUTPATIENT
Start: 2017-11-02 | End: 2019-01-08 | Stop reason: SDUPTHER

## 2017-11-02 RX ORDER — AMLODIPINE BESYLATE 5 MG/1
5 TABLET ORAL DAILY
Qty: 90 TAB | Refills: 0 | Status: SHIPPED | OUTPATIENT
Start: 2017-11-02 | End: 2018-01-30 | Stop reason: SDUPTHER

## 2017-11-13 NOTE — TELEPHONE ENCOUNTER
This pharmacy faxed over request for the following prescriptions to be filled:    Medication requested :   Requested Prescriptions     Pending Prescriptions Disp Refills    fluticasone-Salmeterol (ADVAIR HFA) 45-21 mcg/actuation inhaler 3 Inhaler 1     Sig: Take 2 Puffs by inhalation two (2) times a day.      PCP: [address] or Print: [facility]  Mail order or Local pharmacy [address] [place] [address]    Scheduled appointment if not seen by current providers in office: LOV 7/20/2017 F/U 12/21/2017

## 2017-12-05 RX ORDER — SERTRALINE HYDROCHLORIDE 100 MG/1
100 TABLET, FILM COATED ORAL DAILY
Qty: 90 TAB | Refills: 1 | Status: SHIPPED | OUTPATIENT
Start: 2017-12-05 | End: 2018-06-01 | Stop reason: SDUPTHER

## 2017-12-05 NOTE — TELEPHONE ENCOUNTER
From: Aylin Calero  To:  Rajan Villatoro MD  Sent: 12/5/2017 1:47 AM EST  Subject: Medication Renewal Request    Original authorizing provider: MD Aylin Duong would like a refill of the following medications:  sertraline (ZOLOFT) 100 mg tablet Rajan Villatoro MD]    Preferred pharmacy: [address] [place] [address] 86:  Please refill Zoloft/Sertraline 100mg @ [address] Thank you, Yamilex Ace

## 2017-12-16 ENCOUNTER — HOSPITAL ENCOUNTER (OUTPATIENT)
Dept: LAB | Age: 52
Discharge: HOME | End: 2017-12-16
Payer: COMMERCIAL

## 2017-12-16 DIAGNOSIS — Z12.11 COLON CANCER SCREENING: ICD-10-CM

## 2017-12-16 DIAGNOSIS — Z83.49 FAMILY HISTORY OF THYROID DISEASE: ICD-10-CM

## 2017-12-16 LAB — TSH SERPL DL<=0.05 MIU/L-ACNC: 1.96 UIU/ML (ref 0.36–3.74)

## 2017-12-16 PROCEDURE — 36415 COLL VENOUS BLD VENIPUNCTURE: CPT | Performed by: FAMILY MEDICINE

## 2017-12-16 PROCEDURE — 84443 ASSAY THYROID STIM HORMONE: CPT | Performed by: FAMILY MEDICINE

## 2017-12-16 PROCEDURE — 82274 ASSAY TEST FOR BLOOD FECAL: CPT | Performed by: FAMILY MEDICINE

## 2017-12-16 NOTE — LETTER
1/10/2018 2:05 PM 
 
Ms. [address], [address] 
[address] [place] [address] 51 [address] [facility] [address] Dear [address] [facility], [address]: Please find your most recent results below. Resulted Orders OCCULT BLOOD, IMMUNOASSAY (FIT) Result Value Ref Range Occult blood fecal, by IA NEGATIVE  NEGATIVE Comment:  
   (NOTE) Performed At: [address] [id number] Elizabeth Gilbert MD HJ:[id number] RECOMMENDATIONS: 
Your stool test for blood is negative. Please call me if you have any questions: [phone] Sincerely, 
 
Laurel Maxwell MD

## 2017-12-21 ENCOUNTER — OFFICE VISIT (OUTPATIENT)
Dept: FAMILY MEDICINE CLINIC | Age: 52
End: 2017-12-21

## 2017-12-21 VITALS
WEIGHT: 293 LBS | OXYGEN SATURATION: 96 % | DIASTOLIC BLOOD PRESSURE: 100 MMHG | BODY MASS INDEX: 44.41 KG/M2 | RESPIRATION RATE: 16 BRPM | HEART RATE: 75 BPM | TEMPERATURE: 98.2 F | SYSTOLIC BLOOD PRESSURE: 160 MMHG | HEIGHT: 68 IN

## 2017-12-21 DIAGNOSIS — E78.00 PURE HYPERCHOLESTEROLEMIA: ICD-10-CM

## 2017-12-21 DIAGNOSIS — Z98.84 S/P GASTRIC BYPASS: ICD-10-CM

## 2017-12-21 DIAGNOSIS — F41.9 ANXIETY: ICD-10-CM

## 2017-12-21 DIAGNOSIS — R73.02 IMPAIRED GLUCOSE TOLERANCE: ICD-10-CM

## 2017-12-21 DIAGNOSIS — F33.9 RECURRENT DEPRESSION (HCC): ICD-10-CM

## 2017-12-21 DIAGNOSIS — E55.9 VITAMIN D DEFICIENCY: ICD-10-CM

## 2017-12-21 DIAGNOSIS — Z23 ENCOUNTER FOR IMMUNIZATION: ICD-10-CM

## 2017-12-21 DIAGNOSIS — E66.01 OBESITY, MORBID (HCC): ICD-10-CM

## 2017-12-21 DIAGNOSIS — J45.20 MILD INTERMITTENT ASTHMA WITHOUT COMPLICATION: ICD-10-CM

## 2017-12-21 DIAGNOSIS — I10 ESSENTIAL HYPERTENSION: Primary | ICD-10-CM

## 2017-12-21 RX ORDER — ALPRAZOLAM 0.5 MG/1
0.5 TABLET ORAL
Qty: 12 TAB | Refills: 0 | Status: SHIPPED | OUTPATIENT
Start: 2017-12-21 | End: 2018-10-24 | Stop reason: SDUPTHER

## 2017-12-21 RX ORDER — MONTELUKAST SODIUM 10 MG/1
10 TABLET ORAL DAILY
Qty: 90 TAB | Refills: 3 | Status: SHIPPED | OUTPATIENT
Start: 2017-12-21 | End: 2018-04-03 | Stop reason: SDUPTHER

## 2017-12-21 NOTE — MR AVS SNAPSHOT
Visit Information Date & Time Provider Department Dept. Phone Encounter #  
 12/21/2017  2:30 PM Brent Tran, [address] [id number] Follow-up Instructions Return in about 4 weeks (around 1/18/2018), or if symptoms worsen or fail to improve. Upcoming Health Maintenance Date Due FOBT Q 1 YEAR AGE 50-75 11/4/2016 PAP AKA CERVICAL CYTOLOGY 8/22/2020 DTaP/Tdap/Td series (2 - Td) 7/25/2026 Allergies as of 12/21/2017  Review Complete On: 12/21/2017 By: Brent Tran MD  
  
 Severity Noted Reaction Type Reactions Entex [Phenylephrine-guaifenesin]  08/10/2010    Unknown (comments) Simvastatin  03/20/2013    Myalgia Current Immunizations  Reviewed on 11/8/2016 Name Date Influenza Vaccine (Quad) PF 12/21/2017, 11/8/2016 Not reviewed this visit You Were Diagnosed With   
  
 Codes Comments Essential hypertension    -  Primary ICD-10-CM: I10 
ICD-9-CM: 401.9 Encounter for immunization     ICD-10-CM: R96 ICD-9-CM: V03.89 Obesity, morbid ([facility] [address]     ICD-10-CM: E66.01 
ICD-9-CM: 278.01 Recurrent depression ([facility]     ICD-10-CM: F33.9 ICD-9-CM: 296.30 Vitamin D deficiency     ICD-10-CM: E55.9 ICD-9-CM: 268.9 Mild intermittent asthma without complication     RXN-35-ZC: J45.20 ICD-9-CM: 493.90 Impaired glucose tolerance     ICD-10-CM: R73.02 
ICD-9-CM: 790.22 S/P gastric bypass     ICD-10-CM: K74.19 ICD-9-CM: V45.86 Pure hypercholesterolemia     ICD-10-CM: E78.00 ICD-9-CM: 272.0 Anxiety     ICD-10-CM: F41.9 ICD-9-CM: 300.00 Vitals BP Pulse Temp Resp Height(growth percentile) Weight(growth percentile) (!) 160/100 (BP 1 Location: Left arm, BP Patient Position: Sitting) 75 98.2 °F (36.8 °C) (Oral) 16 5' 8\" (1.727 m) 303 lb (137.4 kg) SpO2 BMI OB Status Smoking Status 96% 46.07 kg/m2 Having regular periods Never Smoker BMI and BSA Data Body Mass Index Body Surface Area 46.07 kg/m 2 2.57 m 2 Preferred Pharmacy Pharmacy Name [facility] [place], [address] [facility] [phone] Your Updated Medication List  
  
   
This list is accurate as of: 12/21/17  3:11 PM.  Always use your most recent med list.  
  
  
  
  
 albuterol 90 mcg/actuation inhaler Commonly known as:  PROVENTIL HFA, VENTOLIN HFA, PROAIR HFA Take 1-2 Puffs by inhalation every four (4) hours as needed for Wheezing or Shortness of Breath. ALPRAZolam 0.5 mg tablet Commonly known as:  Carletha Puller Take 1 Tab by mouth three (3) times daily as needed for Anxiety. Max Daily Amount: 1.5 mg.  
  
 amLODIPine 5 mg tablet Commonly known as:  Luis Angel Chafe Take 1 Tab by mouth daily. B.infantis-B.ani-B.long-B.bifi 10-15 mg Tbec Take  by mouth. fluticasone 50 mcg/actuation nasal spray Commonly known as:  Manson Yin 2 Sprays by Both Nostrils route daily. fluticasone-Salmeterol 45-21 mcg/actuation inhaler Commonly known as:  ADVAIR HFA Take 2 Puffs by inhalation two (2) times a day. ibuprofen 200 mg tablet Commonly known as:  MOTRIN Take  by mouth every six (6) hours as needed. montelukast 10 mg tablet Commonly known as:  SINGULAIR Take 1 Tab by mouth daily. MULTIVITAMIN PO Take 2 Tabs by mouth daily. omeprazole 20 mg capsule Commonly known as:  PRILOSEC Take 20 mg by mouth daily. POTASSIUM-CALCIUM-MAGNESIUM PO Take  by mouth. sertraline 100 mg tablet Commonly known as:  ZOLOFT Take 1 Tab by mouth daily. valsartan 320 mg tablet Commonly known as:  DIOVAN Take 1 Tab by mouth daily. VITAMIN B-12 SL  
by SubLINGual route. VITAMIN D3 1,000 unit Cap Generic drug:  cholecalciferol Take 1,000 Units by mouth daily. ZYRTEC PO Take  by mouth daily. Prescriptions Printed Refills  ALPRAZolam (XANAX) 0.5 mg tablet 0  
 Sig: Take 1 Tab by mouth three (3) times daily as needed for Anxiety. Max Daily Amount: 1.5 mg.  
 Class: Print Route: Oral  
  
We Performed the Following INFLUENZA VIRUS VAC QUAD,SPLIT,PRESV FREE SYRINGE IM [id number] CPT(R)] Follow-up Instructions Return in about 4 weeks (around 1/18/2018), or if symptoms worsen or fail to improve. To-Do List   
 12/21/2017 Lab:  FERRITIN   
  
 12/21/2017 Lab:  HEMOGLOBIN A1C W/O EAG   
  
 12/21/2017 Lab:  IRON PROFILE   
  
 12/21/2017 Lab:  LIPID PANEL   
  
 12/21/2017 Lab:  METABOLIC PANEL, COMPREHENSIVE   
  
 12/21/2017 Lab:  VITAMIN B1, WHOLE BLOOD   
  
 12/21/2017 Lab:  VITAMIN B12 & FOLATE   
  
 12/21/2017 Lab:  VITAMIN D, 25 HYDROXY Patient Instructions Influenza (Flu) Vaccine (Inactivated or Recombinant): What You Need to Know Why get vaccinated? Influenza (\"flu\") is a contagious disease that spreads around the [facility] every winter, usually between October and May. Flu is caused by influenza viruses and is spread mainly by coughing, sneezing, and close contact. Anyone can get flu. Flu strikes suddenly and can last several days. Symptoms vary by age, but can include: · Fever/chills. · Sore throat. · Muscle aches. · Fatigue. · Cough. · Headache. · Runny or stuffy nose. Flu can also lead to pneumonia and blood infections, and cause diarrhea and seizures in children. If you have a medical condition, such as heart or lung disease, flu can make it worse. Flu is more dangerous for some people. Infants and young children, people 72years of age and older, pregnant women, and people with certain health conditions or a weakened immune system are at greatest risk. Each year thousands of people in the [facility] die from flu, and many more are hospitalized. Flu vaccine can: · Keep you from getting flu. · Make flu less severe if you do get it. · Keep you from spreading flu to your family and other people. Inactivated and recombinant flu vaccines A dose of flu vaccine is recommended every flu season. Children 6 months through 6years of age may need two doses during the same flu season. Everyone else needs only one dose each flu season. Some inactivated flu vaccines contain a very small amount of a mercury-based preservative called thimerosal. Studies have not shown thimerosal in vaccines to be harmful, but flu vaccines that do not contain thimerosal are available. There is no live flu virus in flu shots. They cannot cause the flu. There are many flu viruses, and they are always changing. Each year a new flu vaccine is made to protect against three or four viruses that are likely to cause disease in the upcoming flu season. But even when the vaccine doesn't exactly match these viruses, it may still provide some protection. Flu vaccine cannot prevent: · Flu that is caused by a virus not covered by the vaccine. · Illnesses that look like flu but are not. Some people should not get this vaccine Tell the person who is giving you the vaccine: · If you have any severe (life-threatening) allergies. If you ever had a life-threatening allergic reaction after a dose of flu vaccine, or have a severe allergy to any part of this vaccine, you may be advised not to get vaccinated. Most, but not all, types of flu vaccine contain a small amount of egg protein. · If you ever had Guillain-Barré syndrome (also called GBS) Some people with a history of GBS should not get this vaccine. This should be discussed with your doctor. · If you are not feeling well. It is usually okay to get flu vaccine when you have a mild illness, but you might be asked to come back when you feel better. Risks of a vaccine reaction With any medicine, including vaccines, there is a chance of reactions.  These are usually mild and go away on their own, but serious reactions are also possible. Most people who get a flu shot do not have any problems with it. Minor problems following a flu shot include: · Soreness, redness, or swelling where the shot was given · Hoarseness · Sore, red or itchy eyes · Cough · Fever · Aches · Headache · Itching · Fatigue If these problems occur, they usually begin soon after the shot and last 1 or 2 days. More serious problems following a flu shot can include the following: · There may be a small increased risk of Guillain-Barré Syndrome (GBS) after inactivated flu vaccine. This risk has been estimated at 1 or 2 additional cases per million people vaccinated. This is much lower than the risk of severe complications from flu, which can be prevented by flu vaccine. · Deborra Knoll children who get the flu shot along with pneumococcal vaccine (PCV13) and/or DTaP vaccine at the same time might be slightly more likely to have a seizure caused by fever. Ask your doctor for more information. Tell your doctor if a child who is getting flu vaccine has ever had a seizure Problems that could happen after any injected vaccine: · People sometimes faint after a medical procedure, including vaccination. Sitting or lying down for about 15 minutes can help prevent fainting, and injuries caused by a fall. Tell your doctor if you feel dizzy, or have vision changes or ringing in the ears. · Some people get severe pain in the shoulder and have difficulty moving the arm where a shot was given. This happens very rarely. · Any medication can cause a severe allergic reaction. Such reactions from a vaccine are very rare, estimated at about 1 in a million doses, and would happen within a few minutes to a few hours after the vaccination. As with any medicine, there is a very remote chance of a vaccine causing a serious injury or death. The safety of vaccines is always being monitored. For more information, visit: www.cdc.gov/vaccinesafety/. What if there is a serious reaction? What should I look for? · Look for anything that concerns you, such as signs of a severe allergic reaction, very high fever, or unusual behavior. Signs of a severe allergic reaction can include hives, swelling of the face and throat, difficulty breathing, a fast heartbeat, dizziness, and weakness - usually within a few minutes to a few hours after the vaccination. What should I do? · If you think it is a severe allergic reaction or other emergency that can't wait, call 9-1-1 and get the person to the nearest hospital. Otherwise, call your doctor. · Reactions should be reported to the \"Vaccine Adverse Event Reporting System\" (VAERS). Your doctor should file this report, or you can do it yourself through the VAERS website at www.vaers. [employer].gov, or by calling 2-319.150.9107. VAERS does not give medical advice. The National Vaccine Injury Compensation Program 
The National Vaccine Injury Compensation Program (VICP) is a federal program that was created to compensate people who may have been injured by certain vaccines. Persons who believe they may have been injured by a vaccine can learn about the program and about filing a claim by calling 0-789.331.8715 or visiting the [facility] website at www.[facility].gov/vaccinecompensation. There is a time limit to file a claim for compensation. How can I learn more? · Ask your healthcare provider. He or she can give you the vaccine package insert or suggest other sources of information. · Call your local or state health department. · Contact the Centers for Disease Control and Prevention (CDC): 
¨ Call 6-802.789.3836 (1-800-CDC-INFO) or ¨ Visit CDC's website at www.cdc.gov/flu Vaccine Information Statement Inactivated Influenza Vaccine 8/7/2015) 42 SHUBHAM Garcia Flaming 233VX-02 Department of [facility] and [employer] Centers for Disease Control and Prevention Many Vaccine Information Statements are available in Frisian and other languages. See www.immunize.org/vis. Muchas hojas de información sobre vacunas están disponibles en español y en otros idiomas. Visite www.immunize.org/vis. Care instructions adapted under license by [employer] (which disclaims liability or warranty for this information). If you have questions about a medical condition or this instruction, always ask your healthcare professional. [address] any warranty or liability for your use of this information. Introducing [facility]! Dear Jaxon Quiroz: Thank you for requesting a WeYAP account. Our records indicate that you already have an active WeYAP account. You can access your account anytime at [email or web address] Did you know that you can access your hospital and ER discharge instructions at any time in WeYAP? You can also review all of your test results from your hospital stay or ER visit. Additional Information If you have questions, please visit the Frequently Asked Questions section of the WeYAP website at [email or web address] Remember, WeYAP is NOT to be used for urgent needs. For medical emergencies, dial 911. Now available from your iPhone and Android! Please provide this summary of care documentation to your next provider. Your primary care clinician is listed as Ezra Rodriguez. If you have any questions after today's visit, please call [phone].

## 2017-12-21 NOTE — PROGRESS NOTES
Roseanne Hein, 47 y.o.,  female    SUBJECTIVE  Ff-up    Depression/anxiety- says stressful time for her, more anxiety lately. Teacher, strained relationship at work. Requesting refill of xanax, she has not needed to take it for about 2 years now. Continues to take 100 mg zoloft qday. HTN- on norvasc/diovan without problems. GERD- doing well on prilosec. asthma is doing well on advair, hardly uses her albuterol. On singulair and zyrtec as well for allergies. HL, tried simvastatin and red yeast rice previously, difficulty taking these meds. Reports father with recent cardiac bypass. Per pt utd with gyne care with dr. Ozzie purdy def- she is s/p gastric bypass      ROS:  See HPI, all others negative        Patient Active Problem List   Diagnosis Code    HTN (hypertension) I10    Allergic rhinitis J30.9    Depression F32.9    S/P gastric bypass Z98.84    Anxiety F41.9    Iron deficiency E61.1    Vitamin D deficiency E55.9    Irregular menses N92.6    Vertigo R42    Onychomycosis B35.1    Impaired glucose tolerance R73.02    Rosacea L71.9    Gastroesophageal reflux disease without esophagitis K21.9    Need for Tdap vaccination Z23    Asthma J45.909    Obesity, morbid ([address] E66.01    Recurrent depression ([facility] [address] F33.9    Pure hypercholesterolemia E78.00       Current Outpatient Prescriptions   Medication Sig Dispense Refill    ALPRAZolam (XANAX) 0.5 mg tablet Take 1 Tab by mouth three (3) times daily as needed for Anxiety. Max Daily Amount: 1.5 mg. 12 Tab 0    sertraline (ZOLOFT) 100 mg tablet Take 1 Tab by mouth daily. 90 Tab 1    fluticasone-Salmeterol (ADVAIR HFA) 45-21 mcg/actuation inhaler Take 2 Puffs by inhalation two (2) times a day. 3 Inhaler 1    amLODIPine (NORVASC) 5 mg tablet Take 1 Tab by mouth daily.  90 Tab 0    albuterol (PROVENTIL HFA, VENTOLIN HFA, PROAIR HFA) 90 mcg/actuation inhaler Take 1-2 Puffs by inhalation every four (4) hours as needed for Wheezing or Shortness of Breath. 3 Inhaler 3    valsartan (DIOVAN) 320 mg tablet Take 1 Tab by mouth daily. 90 Tab 1    fluticasone (FLONASE) 50 mcg/actuation nasal spray 2 Sprays by Both Nostrils route daily. 3 Bottle 3    montelukast (SINGULAIR) 10 mg tablet Take 1 Tab by mouth daily. 90 Tab 3    cholecalciferol (VITAMIN D3) 1,000 unit cap Take 1,000 Units by mouth daily.  POTASSIUM-CALCIUM-MAGNESIUM PO Take  by mouth.  B.infantis-B.ani-B.long-B.bifi 10-15 mg TbEC Take  by mouth.  omeprazole (PRILOSEC) 20 mg capsule Take 20 mg by mouth daily.  CETIRIZINE HCL (ZYRTEC PO) Take  by mouth daily.  ibuprofen (MOTRIN) 200 mg tablet Take  by mouth every six (6) hours as needed.  MULTIVITAMIN PO Take 2 Tabs by mouth daily.  CYANOCOBALAMIN (VITAMIN B-12 SL) by SubLINGual route. Allergies   Allergen Reactions    Entex [Phenylephrine-Guaifenesin] Unknown (comments)    Simvastatin Myalgia       Past Medical History:   Diagnosis Date    Asthma     Depression     Anxiety    GERD (gastroesophageal reflux disease)     Hypercholesterolemia     Hypertension        Social History     Social History    Marital status:      Spouse name: N/A    Number of children: N/A    Years of education: N/A     Occupational History    Not on file.      Social History Main Topics    Smoking status: Never Smoker    Smokeless tobacco: Never Used    Alcohol use Yes      Comment: 2 drinks per month    Drug use: No    Sexual activity: Not Currently     Partners: Male     Other Topics Concern    Not on file     Social History Narrative       Family History   Problem Relation Age of Onset    Asthma Mother     High Cholesterol Mother     Hypertension Mother     Thyroid Disease Mother     Cancer Mother 79     Thyroid Cancer    Depression Father     High Cholesterol Father     Hypertension Father     Colon Polyps Father     Depression Brother     High Cholesterol Brother     Hypertension Brother     Breast Cancer Maternal Grandmother     High Cholesterol Maternal Grandmother     Hypertension Maternal Grandmother     Diabetes Maternal Grandmother     Cancer Maternal Grandfather      prostate    High Cholesterol Maternal Grandfather     Hypertension Maternal Grandfather     Diabetes Maternal Grandfather     High Cholesterol Paternal Grandmother     Hypertension Paternal Grandmother     Diabetes Paternal Grandmother     Thyroid Disease Paternal Grandmother     High Cholesterol Paternal Grandfather     Hypertension Paternal Grandfather     Diabetes Paternal Grandfather          OBJECTIVE    Physical Exam:     Visit Vitals    BP (!) 160/100 (BP 1 Location: Left arm, BP Patient Position: Sitting)    Pulse 75    Temp 98.2 °F (36.8 °C) (Oral)    Resp 16    Ht 5' 8\" (1.727 m)    Wt 303 lb (137.4 kg)    SpO2 96%    BMI 46.07 kg/m2       General: alert, obese,  in no apparent distress or pain  HEENT: throat, pharynx normal, nasal mucosa, TMs normal.   Neck: no palpable cyst or enlargement. CVS: normal rate, regular rhythm, distinct S1 and S2  Lungs:clear to ausculation bilaterally, no crackles, wheezing or rhonchi noted  Skin: warm, no lesions, rashes noted  Psych:  mood and affect normal        ASSESSMENT/PLAN  Lamine Cuellar was seen today for other, hypertension, anxiety and depression.     Diagnoses and all orders for this visit:    Essential hypertension-  Elevated today  Previously Controlled, cont both diovan and norvasc.   monitoring    Hyperlipidemia  SE with previous zocor and read yeast rice  Father with CAD and her metabolic syndrome will need to be more aggressive with lipid management  Recheck labs soon (Lipid/CMP/a1c, malabsorption labs)  Discussed low fat diet, wt loss strategies, non weight bearing exercises like stationary bike/elliptical/swimming      S/P gastric bypass     vit d def  Improved, currently on  1000 iu od    Depression  Needs better control, cont 100 mg zoloft qd  Given short course xanax   Consider increasing zoloft dose to 200 mg    Anxiety    Allergic rhinitis, unspecified allergic rhinitis type  Cont singulair/zyrtec    Gastroesophageal reflux disease without esophagitis-  Stable, Cont PPI    Asthma, mild intermittent, uncomplicated  Controlled, cont advair, prn albuterol  Declines PCV vaccine, annual flu vaccine UTD    Prediabetes   persistent, TLCs, monitoring    Morbid Obesity HCC  Encouraged wt loss/diet    Encounter for vaccine  Flu vaccine today    Follow-up Disposition:  Return in about 4 weeks (around 1/18/2018), or if symptoms worsen or fail to improve. Patient understands plan of care. Patient has provided input and agrees with goals.

## 2017-12-21 NOTE — PROGRESS NOTES
Chief Complaint   Patient presents with    Hypertension    Cholesterol Problem    Depression    Vitamin D Deficiency    Asthma    Results     1. Have you been to the ER, urgent care clinic since your last visit? Hospitalized since your last visit? No    2. Have you seen or consulted any other health care providers outside of the [address] [place] [address] since your last visit? Include any pap smears or colon screening. Yes When: Aug Where: dr. Iris Doran Reason for visit: Annual with pap    Patient presents for flu vaccine. Consent obtained, Tolerated procedure well at right deltoid. Patient remained in office 15 minutes post vaccine. No side effects noted.      Lot #  [id number]  exp 04/30/2018  MILI  [address]: [id number]

## 2017-12-21 NOTE — PATIENT INSTRUCTIONS

## 2017-12-28 LAB — HEMOCCULT STL QL IA: NEGATIVE

## 2018-01-10 NOTE — TELEPHONE ENCOUNTER
This pharmacy faxed over request for the following prescriptions to be filled:    Medication requested :   Requested Prescriptions     Pending Prescriptions Disp Refills    valsartan (DIOVAN) 320 mg tablet 90 Tab 1     Sig: Take 1 Tab by mouth daily. PCP: [address] [place] [address] or Print: On Site   Mail order or Local pharmacy Jesus salgado    Scheduled appointment if not seen by current providers in office: LOV 12/21/2017 No f/u up Scheduled at this time.   Due 1/18/2018

## 2018-01-11 RX ORDER — VALSARTAN 320 MG/1
320 TABLET ORAL DAILY
Qty: 90 TAB | Refills: 1 | Status: SHIPPED | OUTPATIENT
Start: 2018-01-11 | End: 2018-07-16 | Stop reason: SDUPTHER

## 2018-01-30 RX ORDER — AMLODIPINE BESYLATE 5 MG/1
5 TABLET ORAL DAILY
Qty: 90 TAB | Refills: 2 | Status: SHIPPED | OUTPATIENT
Start: 2018-01-30 | End: 2018-07-31 | Stop reason: SDUPTHER

## 2018-01-30 NOTE — TELEPHONE ENCOUNTER
This patient contacted office for the following prescriptions to be filled:    Medication requested :   Requested Prescriptions     Pending Prescriptions Disp Refills    amLODIPine (NORVASC) 5 mg tablet 90 Tab 0     Sig: Take 1 Tab by mouth daily.          PCP: [address] [place] [address] Street or Print: [facility]   Mail order or Local pharmacy [address]    Scheduled appointment if not seen by current providers in office: LOV  12/21/2017 f/u 2/23/2018

## 2018-02-17 ENCOUNTER — HOSPITAL ENCOUNTER (OUTPATIENT)
Dept: LAB | Age: 53
Discharge: HOME | End: 2018-02-17
Payer: COMMERCIAL

## 2018-02-17 DIAGNOSIS — E55.9 VITAMIN D DEFICIENCY: ICD-10-CM

## 2018-02-17 DIAGNOSIS — E78.00 PURE HYPERCHOLESTEROLEMIA: ICD-10-CM

## 2018-02-17 DIAGNOSIS — Z98.84 S/P GASTRIC BYPASS: ICD-10-CM

## 2018-02-17 DIAGNOSIS — I10 ESSENTIAL HYPERTENSION: ICD-10-CM

## 2018-02-17 DIAGNOSIS — R73.02 IMPAIRED GLUCOSE TOLERANCE: ICD-10-CM

## 2018-02-17 LAB
25(OH)D3 SERPL-MCNC: 23.2 NG/ML (ref 30–100)
ALBUMIN SERPL-MCNC: 3.6 G/DL (ref 3.4–5)
ALBUMIN/GLOB SERPL: 1.3 {RATIO} (ref 0.8–1.7)
ALP SERPL-CCNC: 126 U/L (ref 45–117)
ALT SERPL-CCNC: 36 U/L (ref 13–56)
ANION GAP SERPL CALC-SCNC: 6 MMOL/L (ref 3–18)
AST SERPL-CCNC: 17 U/L (ref 15–37)
BILIRUB SERPL-MCNC: 0.5 MG/DL (ref 0.2–1)
BUN SERPL-MCNC: 12 MG/DL (ref 7–18)
BUN/CREAT SERPL: 21 (ref 12–20)
CALCIUM SERPL-MCNC: 8.7 MG/DL (ref 8.5–10.1)
CHLORIDE SERPL-SCNC: 107 MMOL/L (ref 100–108)
CHOLEST SERPL-MCNC: 239 MG/DL
CO2 SERPL-SCNC: 29 MMOL/L (ref 21–32)
CREAT SERPL-MCNC: 0.58 MG/DL (ref 0.6–1.3)
FERRITIN SERPL-MCNC: 24 NG/ML (ref 8–388)
FOLATE SERPL-MCNC: >20 NG/ML (ref 3.1–17.5)
GLOBULIN SER CALC-MCNC: 2.7 G/DL (ref 2–4)
GLUCOSE SERPL-MCNC: 125 MG/DL (ref 74–99)
HBA1C MFR BLD: 7.1 % (ref 4.2–5.6)
HDLC SERPL-MCNC: 43 MG/DL (ref 40–60)
HDLC SERPL: 5.6 {RATIO} (ref 0–5)
IRON SATN MFR SERPL: 18 %
IRON SERPL-MCNC: 69 UG/DL (ref 50–175)
LDLC SERPL CALC-MCNC: 142.8 MG/DL (ref 0–100)
LIPID PROFILE,FLP: ABNORMAL
POTASSIUM SERPL-SCNC: 4.2 MMOL/L (ref 3.5–5.5)
PROT SERPL-MCNC: 6.3 G/DL (ref 6.4–8.2)
SODIUM SERPL-SCNC: 142 MMOL/L (ref 136–145)
TIBC SERPL-MCNC: 388 UG/DL (ref 250–450)
TRIGL SERPL-MCNC: 266 MG/DL (ref ?–150)
VIT B12 SERPL-MCNC: >2000 PG/ML (ref 211–911)
VLDLC SERPL CALC-MCNC: 53.2 MG/DL

## 2018-02-17 PROCEDURE — 80053 COMPREHEN METABOLIC PANEL: CPT | Performed by: FAMILY MEDICINE

## 2018-02-17 PROCEDURE — 83550 IRON BINDING TEST: CPT | Performed by: FAMILY MEDICINE

## 2018-02-17 PROCEDURE — 83036 HEMOGLOBIN GLYCOSYLATED A1C: CPT | Performed by: FAMILY MEDICINE

## 2018-02-17 PROCEDURE — 82306 VITAMIN D 25 HYDROXY: CPT | Performed by: FAMILY MEDICINE

## 2018-02-17 PROCEDURE — 82607 VITAMIN B-12: CPT | Performed by: FAMILY MEDICINE

## 2018-02-17 PROCEDURE — 80061 LIPID PANEL: CPT | Performed by: FAMILY MEDICINE

## 2018-02-17 PROCEDURE — 84425 ASSAY OF VITAMIN B-1: CPT | Performed by: FAMILY MEDICINE

## 2018-02-17 PROCEDURE — 82728 ASSAY OF FERRITIN: CPT | Performed by: FAMILY MEDICINE

## 2018-02-17 PROCEDURE — 36415 COLL VENOUS BLD VENIPUNCTURE: CPT | Performed by: FAMILY MEDICINE

## 2018-02-20 LAB — VIT B1 BLD-SCNC: 146.5 NMOL/L (ref 66.5–200)

## 2018-02-23 ENCOUNTER — OFFICE VISIT (OUTPATIENT)
Dept: FAMILY MEDICINE CLINIC | Age: 53
End: 2018-02-23

## 2018-02-23 VITALS
HEIGHT: 68 IN | SYSTOLIC BLOOD PRESSURE: 132 MMHG | DIASTOLIC BLOOD PRESSURE: 70 MMHG | BODY MASS INDEX: 44.41 KG/M2 | HEART RATE: 77 BPM | TEMPERATURE: 98 F | WEIGHT: 293 LBS | RESPIRATION RATE: 16 BRPM | OXYGEN SATURATION: 98 %

## 2018-02-23 DIAGNOSIS — E66.01 OBESITY, MORBID (HCC): ICD-10-CM

## 2018-02-23 DIAGNOSIS — J45.20 MILD INTERMITTENT ASTHMA WITHOUT COMPLICATION: ICD-10-CM

## 2018-02-23 DIAGNOSIS — R73.03 PREDIABETES: Primary | ICD-10-CM

## 2018-02-23 DIAGNOSIS — E78.00 PURE HYPERCHOLESTEROLEMIA: ICD-10-CM

## 2018-02-23 DIAGNOSIS — F33.9 RECURRENT DEPRESSION (HCC): ICD-10-CM

## 2018-02-23 DIAGNOSIS — E55.9 VITAMIN D DEFICIENCY: ICD-10-CM

## 2018-02-23 RX ORDER — ATORVASTATIN CALCIUM 10 MG/1
10 TABLET, FILM COATED ORAL DAILY
Qty: 90 TAB | Refills: 0 | Status: SHIPPED | OUTPATIENT
Start: 2018-02-23 | End: 2018-07-31 | Stop reason: SINTOL

## 2018-02-23 NOTE — PROGRESS NOTES
Spring Flores, 47 y.o.,  female    SUBJECTIVE  Ff-up    HL/prediabetes- reviewed labs, worse levels. tried simvastatin and red yeast rice previously, difficulty taking these meds. Reports father with recent cardiac bypass. Stress eating she says    Depression/anxiety- says stressful time for her, more anxiety lately. Teacher, strained relationship at work  Continues to take 100 mg zoloft qday, improved with prn xanax    HTN- on norvasc/diovan without problems. GERD- doing well on prilosec. asthma is doing well on advair, hardly uses her albuterol. On singulair and zyrtec as well for allergies. Per pt utd with gyne care with dr. Basilio purdy def- she is s/p gastric bypass    ROS:  See HPI, all others negative        Patient Active Problem List   Diagnosis Code    HTN (hypertension) I10    Allergic rhinitis J30.9    Depression F32.9    S/P gastric bypass Z98.84    Anxiety F41.9    Iron deficiency E61.1    Vitamin D deficiency E55.9    Irregular menses N92.6    Vertigo R42    Onychomycosis B35.1    Impaired glucose tolerance R73.02    Rosacea L71.9    Gastroesophageal reflux disease without esophagitis K21.9    Need for Tdap vaccination Z23    Asthma J45.909    Obesity, morbid ([facility] E66.01    Recurrent depression ([facility] F33.9    Pure hypercholesterolemia E78.00       Current Outpatient Prescriptions   Medication Sig Dispense Refill    atorvastatin (LIPITOR) 10 mg tablet Take 1 Tab by mouth daily. 90 Tab 0    amLODIPine (NORVASC) 5 mg tablet Take 1 Tab by mouth daily. 90 Tab 2    valsartan (DIOVAN) 320 mg tablet Take 1 Tab by mouth daily. 90 Tab 1    ALPRAZolam (XANAX) 0.5 mg tablet Take 1 Tab by mouth three (3) times daily as needed for Anxiety. Max Daily Amount: 1.5 mg. 12 Tab 0    montelukast (SINGULAIR) 10 mg tablet Take 1 Tab by mouth daily. 90 Tab 3    sertraline (ZOLOFT) 100 mg tablet Take 1 Tab by mouth daily.  90 Tab 1    fluticasone-Salmeterol (ADVAIR HFA) 45-21 mcg/actuation inhaler Take 2 Puffs by inhalation two (2) times a day. 3 Inhaler 1    albuterol (PROVENTIL HFA, VENTOLIN HFA, PROAIR HFA) 90 mcg/actuation inhaler Take 1-2 Puffs by inhalation every four (4) hours as needed for Wheezing or Shortness of Breath. 3 Inhaler 3    fluticasone (FLONASE) 50 mcg/actuation nasal spray 2 Sprays by Both Nostrils route daily. 3 Bottle 3    cholecalciferol (VITAMIN D3) 1,000 unit cap Take 1,000 Units by mouth daily.  POTASSIUM-CALCIUM-MAGNESIUM PO Take  by mouth.  B.infantis-B.ani-B.long-B.bifi 10-15 mg TbEC Take  by mouth.  omeprazole (PRILOSEC) 20 mg capsule Take 20 mg by mouth daily.  CETIRIZINE HCL (ZYRTEC PO) Take  by mouth daily.  ibuprofen (MOTRIN) 200 mg tablet Take  by mouth every six (6) hours as needed.  MULTIVITAMIN PO Take 2 Tabs by mouth daily.  CYANOCOBALAMIN (VITAMIN B-12 SL) by SubLINGual route. Allergies   Allergen Reactions    Entex [Phenylephrine-Guaifenesin] Unknown (comments)    Simvastatin Myalgia       Past Medical History:   Diagnosis Date    Asthma     Depression     Anxiety    GERD (gastroesophageal reflux disease)     Hypercholesterolemia     Hypertension        Social History     Social History    Marital status:      Spouse name: N/A    Number of children: N/A    Years of education: N/A     Occupational History    Not on file.      Social History Main Topics    Smoking status: Never Smoker    Smokeless tobacco: Never Used    Alcohol use Yes      Comment: 2 drinks per month    Drug use: No    Sexual activity: Not Currently     Partners: Male     Other Topics Concern    Not on file     Social History Narrative       Family History   Problem Relation Age of Onset    Asthma Mother    [facility] High Cholesterol Mother     Hypertension Mother     Thyroid Disease Mother     Cancer Mother 79     Thyroid Cancer    Depression Father     High Cholesterol Father     Hypertension Father     Colon Polyps Father     Depression Brother     High Cholesterol Brother     Hypertension Brother     Breast Cancer Maternal Grandmother     High Cholesterol Maternal Grandmother     Hypertension Maternal Grandmother     Diabetes Maternal Grandmother     Cancer Maternal Grandfather      prostate    High Cholesterol Maternal Grandfather     Hypertension Maternal Grandfather     Diabetes Maternal Grandfather     High Cholesterol Paternal Grandmother     Hypertension Paternal Grandmother     Diabetes Paternal Grandmother     Thyroid Disease Paternal Grandmother     High Cholesterol Paternal Grandfather     Hypertension Paternal Grandfather     Diabetes Paternal Grandfather          OBJECTIVE    Physical Exam:     Visit Vitals    /70 (BP 1 Location: Left arm, BP Patient Position: Sitting)    Pulse 77    Temp 98 °F (36.7 °C) (Oral)    Resp 16    Ht 5' 8\" (1.727 m)    Wt 305 lb (138.3 kg)    SpO2 98%    BMI 46.38 kg/m2       General: alert, obese,  in no apparent distress or pain  CVS: normal rate, regular rhythm, distinct S1 and S2  Lungs:clear to ausculation bilaterally, no crackles, wheezing or rhonchi noted  Skin: warm, no lesions, rashes noted  Psych:  mood and affect normal    Results for orders placed or performed during the hospital encounter of 02/17/18   HEMOGLOBIN A1C W/O EAG   Result Value Ref Range    Hemoglobin A1c 7.1 (H) 4.2 - 5.6 %   METABOLIC PANEL, COMPREHENSIVE   Result Value Ref Range    Sodium 142 136 - 145 mmol/L    Potassium 4.2 3.5 - 5.5 mmol/L    Chloride 107 100 - 108 mmol/L    CO2 29 21 - 32 mmol/L    Anion gap 6 3.0 - 18 mmol/L    Glucose 125 (H) 74 - 99 mg/dL    BUN 12 7.0 - 18 MG/DL    Creatinine 0.58 (L) 0.6 - 1.3 MG/DL    BUN/Creatinine ratio 21 (H) 12 - 20      GFR est AA >60 >60 ml/min/1.73m2    GFR est non-AA >60 >60 ml/min/1.73m2    Calcium 8.7 8.5 - 10.1 MG/DL    Bilirubin, total 0.5 0.2 - 1.0 MG/DL    ALT (SGPT) 36 13 - 56 U/L    AST (SGOT) 17 15 - 37 U/L Alk. phosphatase 126 (H) 45 - 117 U/L    Protein, total 6.3 (L) 6.4 - 8.2 g/dL    Albumin 3.6 3.4 - 5.0 g/dL    Globulin 2.7 2.0 - 4.0 g/dL    A-G Ratio 1.3 0.8 - 1.7     LIPID PANEL   Result Value Ref Range    LIPID PROFILE          Cholesterol, total 239 (H) <200 MG/DL    Triglyceride 266 (H) <150 MG/DL    HDL Cholesterol 43 40 - 60 MG/DL    LDL, calculated 142.8 (H) 0 - 100 MG/DL    VLDL, calculated 53.2 MG/DL    CHOL/HDL Ratio 5.6 (H) 0 - 5.0     VITAMIN D, 25 HYDROXY   Result Value Ref Range    Vitamin D 25-Hydroxy 23.2 (L) 30 - 100 ng/mL   IRON PROFILE   Result Value Ref Range    Iron 69 50 - 175 ug/dL    TIBC 388 250 - 450 ug/dL    Iron % saturation 18 %   FERRITIN   Result Value Ref Range    Ferritin 24 8 - 388 NG/ML   VITAMIN B12 & FOLATE   Result Value Ref Range    Vitamin B12 >2000 (H) 211 - 911 pg/mL    Folate >20.0 (H) 3.10 - 17.50 ng/mL   VITAMIN B1, WHOLE BLOOD   Result Value Ref Range    Vitamin B1 146.5 66.5 - 200.0 nmol/L           ASSESSMENT/PLAN  Shirley was seen today for other, hypertension, anxiety and depression.     Diagnoses and all orders for this visit:    Essential hypertension-  Controlled, cont both diovan and norvasc.   monitoring    Hyperlipidemia  Start lipitor 10 mg qhs  Recheck CMP/Lipid panel/Vit d in 3 months  SE with previous zocor and red yeast rice  Father with CAD and her metabolic syndrome will need to be more aggressive with lipid management    S/P gastric bypass     vit d def  Persistently low, s/p gastric bypass  Increase to vit D to 2000 iu daily    Depression   cont 100 mg zoloft qd  Given short course xanax   Consider increasing zoloft dose to 200 mg    Anxiety    Allergic rhinitis, unspecified allergic rhinitis type  Cont singulair/zyrtec    Gastroesophageal reflux disease without esophagitis-  Stable, Cont PPI    Asthma, mild intermittent, uncomplicated  Controlled, cont advair, prn albuterol  Declines PCV vaccine, annual flu vaccine UTD    Prediabetes  Worse, lengthy discussion on wt loss, advised metformin, pt wants to work on weight loss first and monitor  Check a1c/ cmp in 3 months    Morbid Obesity HCC  Encouraged wt loss/diet  Offered nutritionist visit, pt declines    Follow-up Disposition:  Return in about 3 months (around 5/23/2018), or if symptoms worsen or fail to improve. Patient understands plan of care. Patient has provided input and agrees with goals.

## 2018-02-23 NOTE — PROGRESS NOTES
1. Have you been to the ER, urgent care clinic since your last visit? Hospitalized since your last visit? No    2. Have you seen or consulted any other health care providers outside of the [address] [place] [address] since your last visit? Include any pap smears or colon screening.  No

## 2018-02-23 NOTE — MR AVS SNAPSHOT
[address] [place] [address] 
[phone] Patient: Melly Wolff 
MRN:  [id number] Visit Information Date & Time Provider Department Dept. Phone Encounter #  
 2018  2:15 PM Orangel Castilloyanira, [address] [place] [address] Road [id number] Follow-up Instructions Return in about 3 months (around 2018), or if symptoms worsen or fail to improve. Upcoming Health Maintenance Date Due FOBT Q 1 YEAR AGE 50-75 2018 BREAST CANCER SCRN MAMMOGRAM 2019 PAP AKA CERVICAL CYTOLOGY 2020 DTaP/Tdap/Td series (2 - Td) 2026 Allergies as of 2018  Review Complete On: 2018 By: Laura Bernard LPN Severity Noted Reaction Type Reactions Entex [Phenylephrine-guaifenesin]  08/10/2010    Unknown (comments) Simvastatin  2013    Myalgia Current Immunizations  Reviewed on 2016 Name Date Influenza Vaccine (Quad) PF 2017, 2016 Not reviewed this visit You Were Diagnosed With   
  
 Codes Comments Prediabetes    -  Primary ICD-10-CM: R73.03 
ICD-9-CM: 790.29 Rec[facility]     ICD-10-CM: F33.9 ICD-9-CM: 296.30 Obesity, morbid ([facility]     ICD-10-CM: E66.01 
ICD-9-CM: 278.01   
 Pure hypercholesterolemia     ICD-10-CM: E78.00 ICD-9-CM: 272.0 Mild intermittent asthma without complication     MORALEZ-54-XQ: J45.20 ICD-9-CM: 493.90 Vitamin D deficiency     ICD-10-CM: E55.9 ICD-9-CM: 268.9 Vitals BP Pulse Temp Resp Height(growth percentile) Weight(growth percentile) 132/70 (BP 1 Location: Left arm, BP Patient Position: Sitting) 77 98 °F (36.7 °C) (Oral) 16 5' 8\" (1.727 m) 305 lb (138.3 kg) SpO2 BMI OB Status Smoking Status 98% 46.38 kg/m2 Having regular periods Never Smoker Vitals History BMI and BSA Data Body Mass Index Body Surface Area 46.38 kg/m 2 2.58 m 2 Preferred Pharmacy Pharmacy Name Phone [facility], [address] [facility] [phone] Your Updated Medication List  
  
   
This list is accurate as of 2/23/18  2:43 PM.  Always use your most recent med list.  
  
  
  
  
 albuterol 90 mcg/actuation inhaler Commonly known as:  PROVENTIL HFA, VENTOLIN HFA, PROAIR HFA Take 1-2 Puffs by inhalation every four (4) hours as needed for Wheezing or Shortness of Breath. ALPRAZolam 0.5 mg tablet Commonly known as:  Aloma Gaudy Take 1 Tab by mouth three (3) times daily as needed for Anxiety. Max Daily Amount: 1.5 mg.  
  
 amLODIPine 5 mg tablet Commonly known as:  Katerin Callander Take 1 Tab by mouth daily. atorvastatin 10 mg tablet Commonly known as:  LIPITOR Take 1 Tab by mouth daily. B.infantis-B.ani-B.long-B.bifi 10-15 mg Tbec Take  by mouth. fluticasone 50 mcg/actuation nasal spray Commonly known as:  Fifi Elisabeth 2 Sprays by Both Nostrils route daily. fluticasone-Salmeterol 45-21 mcg/actuation inhaler Commonly known as:  ADVAIR HFA Take 2 Puffs by inhalation two (2) times a day. ibuprofen 200 mg tablet Commonly known as:  MOTRIN Take  by mouth every six (6) hours as needed. montelukast 10 mg tablet Commonly known as:  SINGULAIR Take 1 Tab by mouth daily. MULTIVITAMIN PO Take 2 Tabs by mouth daily. omeprazole 20 mg capsule Commonly known as:  PRILOSEC Take 20 mg by mouth daily. POTASSIUM-CALCIUM-MAGNESIUM PO Take  by mouth. sertraline 100 mg tablet Commonly known as:  ZOLOFT Take 1 Tab by mouth daily. valsartan 320 mg tablet Commonly known as:  DIOVAN Take 1 Tab by mouth daily. VITAMIN B-12 SL  
by SubLINGual route. VITAMIN D3 1,000 unit Cap Generic drug:  cholecalciferol Take 1,000 Units by mouth daily. ZYRTEC PO Take  by mouth daily. Prescriptions Sent to Pharmacy Refills  
 atorvastatin (LIPITOR) 10 mg tablet 0 Sig: Take 1 Tab by mouth daily. Class: Normal  
 Pharmacy: [facility], [address] Ph #: [phone] Route: Oral  
  
Follow-up Instructions Return in about 3 months (around 5/23/2018), or if symptoms worsen or fail to improve. To-Do List   
 02/23/2018 Lab:  HEMOGLOBIN A1C W/O EAG   
  
 02/23/2018 Lab:  LIPID PANEL   
  
 02/23/2018 Lab:  METABOLIC PANEL, COMPREHENSIVE   
  
 02/23/2018 Lab:  VITAMIN D, 25 HYDROXY Patient Instructions Learning About Diabetes Food Guidelines Your Care Instructions Meal planning is important to manage diabetes. It helps keep your blood sugar at a target level (which you set with your doctor). You don't have to eat special foods. You can eat what your family eats, including sweets once in a while. But you do have to pay attention to how often you eat and how much you eat of certain foods. You may want to work with a dietitian or a certified diabetes educator (CDE) to help you plan meals and snacks. A dietitian or CDE can also help you lose weight if that is one of your goals. What should you know about eating carbs? Managing the amount of carbohydrate (carbs) you eat is an important part of healthy meals when you have diabetes. Carbohydrate is found in many foods. · Learn which foods have carbs. And learn the amounts of carbs in different foods. ¨ Bread, cereal, pasta, and rice have about 15 grams of carbs in a serving. A serving is 1 slice of bread (1 ounce), ½ cup of cooked cereal, or 1/3 cup of cooked pasta or rice. ¨ Fruits have 15 grams of carbs in a serving. A serving is 1 small fresh fruit, such as an apple or orange; ½ of a banana; ½ cup of cooked or canned fruit; ½ cup of fruit juice; 1 cup of melon or raspberries; or 2 tablespoons of dried fruit. ¨ Milk and no-sugar-added yogurt have 15 grams of carbs in a serving. A serving is 1 cup of milk or 2/3 cup of no-sugar-added yogurt. ¨ Starchy vegetables have 15 grams of carbs in a serving. A serving is ½ cup of mashed potatoes or sweet potato; 1 cup winter squash; ½ of a small baked potato; ½ cup of cooked beans; or ½ cup cooked corn or green peas. · Learn how much carbs to eat each day and at each meal. A dietitian or CDE can teach you how to keep track of the amount of carbs you eat. This is called carbohydrate counting. · If you are not sure how to count carbohydrate grams, use the Plate Method to plan meals. It is a good, quick way to make sure that you have a balanced meal. It also helps you spread carbs throughout the day. ¨ Divide your plate by types of foods. Put non-starchy vegetables on half the plate, meat or other protein food on one-quarter of the plate, and a grain or starchy vegetable in the final quarter of the plate. To this you can add a small piece of fruit and 1 cup of milk or yogurt, depending on how many carbs you are supposed to eat at a meal. 
· Try to eat about the same amount of carbs at each meal. Do not \"save up\" your daily allowance of carbs to eat at one meal. 
· Proteins have very little or no carbs per serving. Examples of proteins are beef, chicken, turkey, fish, eggs, tofu, cheese, cottage cheese, and peanut butter. A serving size of meat is 3 ounces, which is about the size of a deck of cards. Examples of meat substitute serving sizes (equal to 1 ounce of meat) are 1/4 cup of cottage cheese, 1 egg, 1 tablespoon of peanut butter, and ½ cup of tofu. How can you eat out and still eat healthy? · Learn to estimate the serving sizes of foods that have carbohydrate. If you measure food at home, it will be easier to estimate the amount in a serving of restaurant food.  
· If the meal you order has too much carbohydrate (such as potatoes, corn, or baked beans), ask to have a low-carbohydrate food instead. Ask for a salad or green vegetables. · If you use insulin, check your blood sugar before and after eating out to help you plan how much to eat in the future. · If you eat more carbohydrate at a meal than you had planned, take a walk or do other exercise. This will help lower your blood sugar. What else should you know? · Limit saturated fat, such as the fat from meat and dairy products. This is a healthy choice because people who have diabetes are at higher risk of heart disease. So choose lean cuts of meat and nonfat or low-fat dairy products. Use olive or canola oil instead of butter or shortening when cooking. · Don't skip meals. Your blood sugar may drop too low if you skip meals and take insulin or certain medicines for diabetes. · Check with your doctor before you drink alcohol. Alcohol can cause your blood sugar to drop too low. Alcohol can also cause a bad reaction if you take certain diabetes medicines. Follow-up care is a key part of your treatment and safety. Be sure to make and go to all appointments, and call your doctor if you are having problems. It's also a good idea to know your test results and keep a list of the medicines you take. Where can you learn more? Go to http://lo-jennie.info/. Enter L760 in the search box to learn more about \"Learning About Diabetes Food Guidelines. \" Current as of: March 13, 2017 Content Version: 11.4 © 7981-0411 Healthwise, [employer]. Care instructions adapted under license by [employer] (which disclaims liability or warranty for this information). If you have questions about a medical condition or this instruction, always ask your healthcare professional. [place] [address] any warranty or liability for your use of this information. Introducing [facility]! Dear Monika Umanzor: Thank you for requesting a [employer] account. Our records indicate that you already have an active [employer] account. You can access your account anytime at [email or web address]. [email or web address] Did you know that you can access your hospital and ER discharge instructions at any time in [employer]? You can also review all of your test results from your hospital stay or ER visit. Additional Information If you have questions, please visit the Frequently Asked Questions section of the [employer] website at [email or web address]/. Remember, [employer] is NOT to be used for urgent needs. For medical emergencies, dial 911. Now available from your iPhone and Android! Please provide this summary of care documentation to your next provider. Your primary care clinician is listed as Orlie Gilford. If you have any questions after today's visit, please call [phone].

## 2018-02-23 NOTE — PATIENT INSTRUCTIONS

## 2018-04-03 DIAGNOSIS — I10 ESSENTIAL HYPERTENSION: ICD-10-CM

## 2018-04-03 DIAGNOSIS — J45.20 MILD INTERMITTENT ASTHMA WITHOUT COMPLICATION: ICD-10-CM

## 2018-04-03 RX ORDER — MONTELUKAST SODIUM 10 MG/1
10 TABLET ORAL DAILY
Qty: 90 TAB | Refills: 0 | Status: SHIPPED | OUTPATIENT
Start: 2018-04-03 | End: 2018-12-24 | Stop reason: SDUPTHER

## 2018-04-03 RX ORDER — OMEPRAZOLE 20 MG/1
20 CAPSULE, DELAYED RELEASE ORAL DAILY
Qty: 90 CAP | Refills: 0 | Status: SHIPPED | OUTPATIENT
Start: 2018-04-03 | End: 2018-07-16 | Stop reason: SDUPTHER

## 2018-04-03 NOTE — TELEPHONE ENCOUNTER
LOV 02/23/2018  F/U 06/21/2018  Please send in to pharmacy specified. Patient states she called pharmacy for refill over a week ago and has not heard back.  I did advise we did not have a refill pending and would send this to nurse as an urgent request.

## 2018-06-01 NOTE — TELEPHONE ENCOUNTER
This patient contacted office for the following prescriptions to be filled:    Medication requested :   Requested Prescriptions     Pending Prescriptions Disp Refills    sertraline (ZOLOFT) 100 mg tablet 90 Tab 1     Sig: Take 1 Tab by mouth daily.  fluticasone-Salmeterol (ADVAIR HFA) 45-21 mcg/actuation inhaler 3 Inhaler 1     Sig: Take 2 Puffs by inhalation two (2) times a day.      PCP: [address] [place] [address] or Print: [facility]   Mail order or Local pharmacy [facility],   [address] [facility]    Scheduled appointment if not seen by current providers in office: LOV 2/23/2018 f/u 6/21/2018

## 2018-06-02 ENCOUNTER — TELEPHONE (OUTPATIENT)
Dept: FAMILY MEDICINE CLINIC | Age: 53
End: 2018-06-02

## 2018-06-02 NOTE — PROGRESS NOTES
Pt called on Saturday morning about advair refill. Said every time she calls for refill taking long time and sounded very frustrated. Also stated [facility]. I have advised her that can call in medication refill to different pharmacy if she gives me location or phone number. She was still mentioning that she is ok and she will go with her rescue inhaler for weekend and insisted to send a message to pcp. I assure her that will do that and ask pcp to talk to pt as she requested.

## 2018-06-04 RX ORDER — SERTRALINE HYDROCHLORIDE 100 MG/1
100 TABLET, FILM COATED ORAL DAILY
Qty: 90 TAB | Refills: 1 | OUTPATIENT
Start: 2018-06-04

## 2018-06-04 RX ORDER — SERTRALINE HYDROCHLORIDE 100 MG/1
100 TABLET, FILM COATED ORAL DAILY
Qty: 90 TAB | Refills: 1 | Status: SHIPPED | OUTPATIENT
Start: 2018-06-04 | End: 2018-11-26 | Stop reason: SDUPTHER

## 2018-06-04 NOTE — TELEPHONE ENCOUNTER
From: Iván Kim  To: Elodia Chadwick MD  Sent: 6/2/2018 11:31 AM EDT  Subject: Medication Renewal Request    Original authorizing provider: MD Iván Sadler would like a refill of the following medications:  fluticasone-Salmeterol (ADVAIR HFA) 45-21 mcg/actuation inhaler Elodia Chadwick MD]  sertraline (ZOLOFT) 100 mg tablet Elodia Chadwick MD]    Preferred pharmacy: [facility] [address]:  I requested these to be sent to [facility] on Friday (1:45), and the  said that it would be sent in by email/fax. It was not sent in. There is a huge problem with the communication in this office.

## 2018-06-04 NOTE — TELEPHONE ENCOUNTER
Called [facility] and they stated that Advair will be in stock today. Called patient to see if she wanted to get Advair from [facility] today after 2 pm or do we need to send to a different pharmacy.  (left message )

## 2018-07-16 ENCOUNTER — TELEPHONE (OUTPATIENT)
Dept: FAMILY MEDICINE CLINIC | Age: 53
End: 2018-07-16

## 2018-07-16 RX ORDER — FLUTICASONE PROPIONATE 50 MCG
2 SPRAY, SUSPENSION (ML) NASAL DAILY
Qty: 3 BOTTLE | Refills: 3 | Status: SHIPPED | OUTPATIENT
Start: 2018-07-16 | End: 2019-06-17 | Stop reason: SDUPTHER

## 2018-07-16 RX ORDER — VALSARTAN 320 MG/1
320 TABLET ORAL DAILY
Qty: 90 TAB | Refills: 1 | Status: SHIPPED | OUTPATIENT
Start: 2018-07-16 | End: 2018-07-24

## 2018-07-16 RX ORDER — OMEPRAZOLE 20 MG/1
20 CAPSULE, DELAYED RELEASE ORAL DAILY
Qty: 90 CAP | Refills: 0 | Status: SHIPPED | OUTPATIENT
Start: 2018-07-16 | End: 2018-10-24 | Stop reason: SDUPTHER

## 2018-07-16 NOTE — TELEPHONE ENCOUNTER
From: Roseanne Hein  To:  Scott Castillo MD  Sent: 7/16/2018 10:20 AM EDT  Subject: Medication Renewal Request    Original authorizing provider: MD Roseanne Du would like a refill of the following medications:  omeprazole (PRILOSEC) 20 mg capsule Scott Castillo MD]    Preferred pharmacy: [facility]:  Please send refills requested to [address] [place] [address] [phone] Thank you, Jeremiah Patel Valsartan Omeprazole Fluticasone Nasal Spray    Medication renewals requested in this message routed to other providers:  fluticasone (FLONASE) 50 mcg/actuation nasal spray Wendy Borjas MD]  valsartan (DIOVAN) 320 mg tablet Wenyd Borjas MD]

## 2018-07-16 NOTE — TELEPHONE ENCOUNTER
From: Violette Moreno  To:  Ladan Calhoun MD  Sent: 7/16/2018 10:20 AM EDT  Subject: Medication Renewal Request    Original authorizing provider: MD Violette Lau would like a refill of the following medications:  fluticasone (FLONASE) 50 mcg/actuation nasal spray Ladan Calhoun MD]  valsartan (DIOVAN) 320 mg tablet Ladan Calhoun MD]    Preferred pharmacy: [facility]:  Please send refills requested to [address] [place] [address] [phone] Thank you, Gabe Marcial Valsartan Omeprazole Fluticasone Nasal Spray    Medication renewals requested in this message routed to other providers:  omeprazole (PRILOSEC) 20 mg capsule Jerrell Lai MD]

## 2018-07-16 NOTE — TELEPHONE ENCOUNTER
Pt has an appt on Tuesday, July 31, 2018 09:30 AM she states that she should have a lab order to be picked up. Could you please print the order for her. Advise when ready for .  Thank you

## 2018-07-17 ENCOUNTER — TELEPHONE (OUTPATIENT)
Dept: FAMILY MEDICINE CLINIC | Age: 53
End: 2018-07-17

## 2018-07-17 NOTE — TELEPHONE ENCOUNTER
Per [facility], patient has [employer] brand and it is not on recall. Contacted patient and verified identity using name and date of birth (2- identifiers). Patient informed her Valsartan brand is not on recall. Patient voiced understanding and will contact pharmacy with any questions/problems.

## 2018-07-17 NOTE — TELEPHONE ENCOUNTER
Irasema Vergara   to Alona Delgado MD        1:29 AM   I am concerned because my VALSARTAN medication has been recalled by the FDA.  (July 13, 2018)   I do not want to  my prescription if it is recalled, but I do not know what to do in the mean time?  Medication switch?  I have two days of Valsartan left.  Please call to advise.    Thank you,   Sapna Colbert

## 2018-07-24 RX ORDER — LOSARTAN POTASSIUM 100 MG/1
100 TABLET ORAL DAILY
Qty: 90 TAB | Refills: 0 | Status: SHIPPED | OUTPATIENT
Start: 2018-07-24 | End: 2018-10-24 | Stop reason: SDUPTHER

## 2018-07-25 ENCOUNTER — HOSPITAL ENCOUNTER (OUTPATIENT)
Dept: LAB | Age: 53
Discharge: HOME | End: 2018-07-25
Payer: COMMERCIAL

## 2018-07-25 DIAGNOSIS — R73.03 PREDIABETES: ICD-10-CM

## 2018-07-25 DIAGNOSIS — E78.00 PURE HYPERCHOLESTEROLEMIA: ICD-10-CM

## 2018-07-25 DIAGNOSIS — E55.9 VITAMIN D DEFICIENCY: ICD-10-CM

## 2018-07-25 LAB
25(OH)D3 SERPL-MCNC: 18.4 NG/ML (ref 30–100)
ALBUMIN SERPL-MCNC: 3.6 G/DL (ref 3.4–5)
ALBUMIN/GLOB SERPL: 1.2 {RATIO} (ref 0.8–1.7)
ALP SERPL-CCNC: 128 U/L (ref 45–117)
ALT SERPL-CCNC: 30 U/L (ref 13–56)
ANION GAP SERPL CALC-SCNC: 8 MMOL/L (ref 3–18)
AST SERPL-CCNC: 16 U/L (ref 15–37)
BILIRUB SERPL-MCNC: 0.5 MG/DL (ref 0.2–1)
BUN SERPL-MCNC: 16 MG/DL (ref 7–18)
BUN/CREAT SERPL: 24 (ref 12–20)
CALCIUM SERPL-MCNC: 8.4 MG/DL (ref 8.5–10.1)
CHLORIDE SERPL-SCNC: 106 MMOL/L (ref 100–108)
CHOLEST SERPL-MCNC: 253 MG/DL
CO2 SERPL-SCNC: 28 MMOL/L (ref 21–32)
CREAT SERPL-MCNC: 0.67 MG/DL (ref 0.6–1.3)
GLOBULIN SER CALC-MCNC: 2.9 G/DL (ref 2–4)
GLUCOSE SERPL-MCNC: 110 MG/DL (ref 74–99)
HBA1C MFR BLD: 7.2 % (ref 4.2–5.6)
HDLC SERPL-MCNC: 40 MG/DL (ref 40–60)
HDLC SERPL: 6.3 {RATIO} (ref 0–5)
LDLC SERPL CALC-MCNC: 156.6 MG/DL (ref 0–100)
LIPID PROFILE,FLP: ABNORMAL
POTASSIUM SERPL-SCNC: 4.2 MMOL/L (ref 3.5–5.5)
PROT SERPL-MCNC: 6.5 G/DL (ref 6.4–8.2)
SODIUM SERPL-SCNC: 142 MMOL/L (ref 136–145)
TRIGL SERPL-MCNC: 282 MG/DL (ref ?–150)
VLDLC SERPL CALC-MCNC: 56.4 MG/DL

## 2018-07-25 PROCEDURE — 83036 HEMOGLOBIN GLYCOSYLATED A1C: CPT | Performed by: FAMILY MEDICINE

## 2018-07-25 PROCEDURE — 82306 VITAMIN D 25 HYDROXY: CPT | Performed by: FAMILY MEDICINE

## 2018-07-25 PROCEDURE — 36415 COLL VENOUS BLD VENIPUNCTURE: CPT | Performed by: FAMILY MEDICINE

## 2018-07-25 PROCEDURE — 80061 LIPID PANEL: CPT | Performed by: FAMILY MEDICINE

## 2018-07-25 PROCEDURE — 80053 COMPREHEN METABOLIC PANEL: CPT | Performed by: FAMILY MEDICINE

## 2018-07-31 ENCOUNTER — OFFICE VISIT (OUTPATIENT)
Dept: FAMILY MEDICINE CLINIC | Age: 53
End: 2018-07-31

## 2018-07-31 VITALS
TEMPERATURE: 98.3 F | BODY MASS INDEX: 44.41 KG/M2 | DIASTOLIC BLOOD PRESSURE: 72 MMHG | HEART RATE: 76 BPM | RESPIRATION RATE: 16 BRPM | OXYGEN SATURATION: 97 % | WEIGHT: 293 LBS | HEIGHT: 68 IN | SYSTOLIC BLOOD PRESSURE: 130 MMHG

## 2018-07-31 DIAGNOSIS — E66.01 OBESITY, MORBID (HCC): Primary | ICD-10-CM

## 2018-07-31 DIAGNOSIS — R73.09 ELEVATED GLUCOSE: ICD-10-CM

## 2018-07-31 DIAGNOSIS — F33.9 RECURRENT DEPRESSION (HCC): ICD-10-CM

## 2018-07-31 DIAGNOSIS — E55.9 VITAMIN D DEFICIENCY: ICD-10-CM

## 2018-07-31 DIAGNOSIS — J45.20 MILD INTERMITTENT ASTHMA WITHOUT COMPLICATION: ICD-10-CM

## 2018-07-31 DIAGNOSIS — K21.9 GASTROESOPHAGEAL REFLUX DISEASE WITHOUT ESOPHAGITIS: ICD-10-CM

## 2018-07-31 DIAGNOSIS — E78.00 PURE HYPERCHOLESTEROLEMIA: ICD-10-CM

## 2018-07-31 DIAGNOSIS — I10 ESSENTIAL HYPERTENSION: ICD-10-CM

## 2018-07-31 RX ORDER — METFORMIN HYDROCHLORIDE 500 MG/1
1000 TABLET, EXTENDED RELEASE ORAL
Qty: 180 TAB | Refills: 0 | Status: SHIPPED | OUTPATIENT
Start: 2018-07-31 | End: 2019-01-08 | Stop reason: SDUPTHER

## 2018-07-31 RX ORDER — ROSUVASTATIN CALCIUM 5 MG/1
5 TABLET, COATED ORAL
Qty: 90 TAB | Refills: 0 | Status: SHIPPED | OUTPATIENT
Start: 2018-07-31 | End: 2019-06-17

## 2018-07-31 RX ORDER — ERGOCALCIFEROL 1.25 MG/1
50000 CAPSULE ORAL
Qty: 12 CAP | Refills: 3 | Status: SHIPPED | OUTPATIENT
Start: 2018-07-31 | End: 2018-10-24 | Stop reason: SDUPTHER

## 2018-07-31 RX ORDER — AMLODIPINE BESYLATE 5 MG/1
5 TABLET ORAL DAILY
Qty: 90 TAB | Refills: 2 | Status: SHIPPED | OUTPATIENT
Start: 2018-07-31 | End: 2018-10-24 | Stop reason: SDUPTHER

## 2018-07-31 NOTE — PROGRESS NOTES
Ria Vivar, 47 y.o.,  female    SUBJECTIVE  Ff-up    Prediabetes- h/o, reports improved diet and weight loss, reviewed labs still elevated a1c/glucose level. She is more open to trying medication this time. HL-  Reports myalgia on lipitor as well,  tried simvastatin and red yeast rice previously, difficulty taking these meds. Reports father with recent cardiac bypass. Depression/anxiety- for the most part doing well, improved over summer break. Teacher, strained relationship at work  Continues to take 100 mg zoloft qday, improved with prn xanax    HTN- on norvasc, switched valsartan to losartan with recall. GERD- doing well on prilosec. asthma is doing well on advair, hardly uses her albuterol. On singulair and zyrtec as well for allergies. Vit d def- she is s/p gastric bypass    Per pt utd with gyne care with dr. Nevarez Half:  See HPI, all others negative        Patient Active Problem List   Diagnosis Code    HTN (hypertension) I10    Allergic rhinitis J30.9    Depression F32.9    S/P gastric bypass Z98.84    Anxiety F41.9    Iron deficiency E61.1    Vitamin D deficiency E55.9    Irregular menses N92.6    Vertigo R42    Onychomycosis B35.1    Impaired glucose tolerance R73.02    Rosacea L71.9    Gastroesophageal reflux disease without esophagitis K21.9    Need for Tdap vaccination Z23    Asthma J45.909    Obesity, morbid ([facility] E66.01    Recurrent depression ([facility] F33.9    Pure hypercholesterolemia E78.00       Current Outpatient Prescriptions   Medication Sig Dispense Refill    metFORMIN ER (GLUCOPHAGE XR) 500 mg tablet Take 2 Tabs by mouth daily (with dinner). 180 Tab 0    ergocalciferol (ERGOCALCIFEROL) 50,000 unit capsule Take 1 Cap by mouth every seven (7) days. 12 Cap 3    losartan (COZAAR) 100 mg tablet Take 1 Tab by mouth daily. 90 Tab 0    fluticasone (FLONASE) 50 mcg/actuation nasal spray 2 Sprays by Both Nostrils route daily.  3 Bottle 3    omeprazole (PRILOSEC) 20 mg capsule Take 1 Cap by mouth daily. 90 Cap 0    sertraline (ZOLOFT) 100 mg tablet Take 1 Tab by mouth daily. 90 Tab 1    fluticasone-Salmeterol (ADVAIR HFA) 45-21 mcg/actuation inhaler Take 2 Puffs by inhalation two (2) times a day. 3 Inhaler 1    montelukast (SINGULAIR) 10 mg tablet Take 1 Tab by mouth daily. 90 Tab 0    amLODIPine (NORVASC) 5 mg tablet Take 1 Tab by mouth daily. 90 Tab 2    ALPRAZolam (XANAX) 0.5 mg tablet Take 1 Tab by mouth three (3) times daily as needed for Anxiety. Max Daily Amount: 1.5 mg. 12 Tab 0    albuterol (PROVENTIL HFA, VENTOLIN HFA, PROAIR HFA) 90 mcg/actuation inhaler Take 1-2 Puffs by inhalation every four (4) hours as needed for Wheezing or Shortness of Breath. 3 Inhaler 3    cholecalciferol (VITAMIN D3) 1,000 unit cap Take 1,000 Units by mouth daily.  B.infantis-B.ani-B.long-B.bifi 10-15 mg TbEC Take  by mouth.  CETIRIZINE HCL (ZYRTEC PO) Take  by mouth daily.  ibuprofen (MOTRIN) 200 mg tablet Take  by mouth every six (6) hours as needed.  MULTIVITAMIN PO Take 2 Tabs by mouth daily.  CYANOCOBALAMIN (VITAMIN B-12 SL) by SubLINGual route.  atorvastatin (LIPITOR) 10 mg tablet Take 1 Tab by mouth daily. 90 Tab 0    POTASSIUM-CALCIUM-MAGNESIUM PO Take  by mouth. Allergies   Allergen Reactions    Entex [Phenylephrine-Guaifenesin] Unknown (comments)    Simvastatin Myalgia       Past Medical History:   Diagnosis Date    Asthma     Depression     Anxiety    GERD (gastroesophageal reflux disease)     Hypercholesterolemia     Hypertension        Social History     Social History    Marital status:      Spouse name: N/A    Number of children: N/A    Years of education: N/A     Occupational History    Not on file.      Social History Main Topics    Smoking status: Never Smoker    Smokeless tobacco: Never Used    Alcohol use Yes      Comment: 2 drinks per month    Drug use: No    Sexual activity: Not Currently Partners: Male     Other Topics Concern    Not on file     Social History Narrative       Family History   Problem Relation Age of Onset    Asthma Mother     High Cholesterol Mother     Hypertension Mother     Thyroid Disease Mother     Cancer Mother 79     Thyroid Cancer    Depression Father     High Cholesterol Father     Hypertension Father     Colon Polyps Father     Depression Brother     High Cholesterol Brother     Hypertension Brother     Breast Cancer Maternal Grandmother     High Cholesterol Maternal Grandmother     Hypertension Maternal Grandmother     Diabetes Maternal Grandmother     Cancer Maternal Grandfather      prostate    High Cholesterol Maternal Grandfather     Hypertension Maternal Grandfather     Diabetes Maternal Grandfather     High Cholesterol Paternal Grandmother     Hypertension Paternal Grandmother     Diabetes Paternal Grandmother     Thyroid Disease Paternal Grandmother     High Cholesterol Paternal Grandfather     Hypertension Paternal Grandfather     Diabetes Paternal Grandfather          OBJECTIVE    Physical Exam:     Visit Vitals    /72 (BP 1 Location: Left arm, BP Patient Position: Sitting)    Pulse 76    Temp 98.3 °F (36.8 °C) (Oral)    Resp 16    Ht 5' 8\" (1.727 m)    Wt 296 lb 3.2 oz (134.4 kg)    SpO2 97%    BMI 45.04 kg/m2       General: alert, obese,  in no apparent distress or pain  CVS: normal rate, regular rhythm, distinct S1 and S2  Lungs:clear to ausculation bilaterally, no crackles, wheezing or rhonchi noted  Skin: warm, no lesions, rashes noted  Psych:  mood and affect normal    Results for orders placed or performed during the hospital encounter of 07/25/18   HEMOGLOBIN A1C W/O EAG   Result Value Ref Range    Hemoglobin A1c 7.2 (H) 4.2 - 5.6 %   LIPID PANEL   Result Value Ref Range    LIPID PROFILE          Cholesterol, total 253 (H) <200 MG/DL    Triglyceride 282 (H) <150 MG/DL    HDL Cholesterol 40 40 - 60 MG/DL LDL, calculated 156.6 (H) 0 - 100 MG/DL    VLDL, calculated 56.4 MG/DL    CHOL/HDL Ratio 6.3 (H) 0 - 5.0     METABOLIC PANEL, COMPREHENSIVE   Result Value Ref Range    Sodium 142 136 - 145 mmol/L    Potassium 4.2 3.5 - 5.5 mmol/L    Chloride 106 100 - 108 mmol/L    CO2 28 21 - 32 mmol/L    Anion gap 8 3.0 - 18 mmol/L    Glucose 110 (H) 74 - 99 mg/dL    BUN 16 7.0 - 18 MG/DL    Creatinine 0.67 0.6 - 1.3 MG/DL    BUN/Creatinine ratio 24 (H) 12 - 20      GFR est AA >60 >60 ml/min/1.73m2    GFR est non-AA >60 >60 ml/min/1.73m2    Calcium 8.4 (L) 8.5 - 10.1 MG/DL    Bilirubin, total 0.5 0.2 - 1.0 MG/DL    ALT (SGPT) 30 13 - 56 U/L    AST (SGOT) 16 15 - 37 U/L    Alk. phosphatase 128 (H) 45 - 117 U/L    Protein, total 6.5 6.4 - 8.2 g/dL    Albumin 3.6 3.4 - 5.0 g/dL    Globulin 2.9 2.0 - 4.0 g/dL    A-G Ratio 1.2 0.8 - 1.7     VITAMIN D, 25 HYDROXY   Result Value Ref Range    Vitamin D 25-Hydroxy 18.4 (L) 30 - 100 ng/mL           ASSESSMENT/PLAN  Shirley was seen today for other, hypertension, anxiety and depression.     Diagnoses and all orders for this visit:    Elevated glucose  Discussed already c/w DM, discussed disease process/complications and treatment  Start metformin  mg for a week then go up to 1000 mg  ADA diet discussed  Ff-up in 6  Weeks    Essential hypertension-  Controlled, cont losartan  and norvasc.   monitoring    Hyperlipidemia  Trial crestor 5 mg qhs   intolorant to lipitor, zocor and red yeast rice  Father with CAD and her metabolic syndrome will need to be more aggressive with lipid management    S/P gastric bypass    Vitamin D Deficiency  Persistently low, s/p gastric bypass  Advised 50k weekly year round    Major Depression  Fair control,  cont 100 mg zoloft qd  Given short course xanax   Consider increasing zoloft dose to 200 mg    Anxiety    Allergic rhinitis, unspecified allergic rhinitis type  Cont singulair/zyrtec    Gastroesophageal reflux disease without esophagitis-  Stable, Cont PPI    Asthma, mild intermittent, uncomplicated  Controlled, cont advair, prn albuterol  Declines PCV vaccine, annual flu vaccine UTD    Morbid Obesity [facility]  Encouraged wt loss/diet  Offered nutritionist visit, pt declines    Follow-up Disposition:  Return in about 6 weeks (around 9/11/2018), or if symptoms worsen or fail to improve. Patient understands plan of care. Patient has provided input and agrees with goals.

## 2018-07-31 NOTE — PATIENT INSTRUCTIONS
Learning About Metformin for Type 2 Diabetes  Introduction    Metformin (such as Glucophage) is a medicine used to treat type 2 diabetes. It helps keep blood sugar levels on target. You may have tried to eat a healthy diet, lose weight, and get more exercise to keep your blood sugar in your target range. If those things do not help, you may take a medicine called metformin. It helps your body use insulin. This can help you control your blood sugar. You might take it on its own or with other medicines. When taken on its own, metformin should not cause low blood sugar or weight gain. Example  · Metformin (Glucophage)  Possible side effects  Common side effects include:  · Short-term nausea. · Not feeling hungry. · Diarrhea. · Increased gas in your belly. · A metallic taste. You may have side effects or reactions not listed here. Check the information that comes with your medicine. What to know about taking this medicine  · Metformin does not usually cause low blood sugar. But you may get a low blood sugar when you take metformin and you exercise hard, drink alcohol, or you do not eat enough food. · Sometimes metformin is combined with other diabetes medicine. Some of these can cause low blood sugar. · If you need a test that uses a dye or you need to have surgery, be sure to tell all of your doctors that you take metformin. You may have to stop taking it before and after the test or surgery. · Over time, blood levels of vitamin B12 can decrease in some people who take metformin. Your body needs this B vitamin to make blood cells. It also keeps your nervous system healthy. If you have been taking metformin for more than a few years, ask your doctor if you need a B12 blood test to measure the amount of vitamin B12 in your blood. · Be safe with medicines. Take your medicines exactly as prescribed. Call your doctor if you think you are having a problem with your medicine.   · Check with your doctor or pharmacist before you use any other medicines. This includes over-the-counter medicines. Make sure your doctor knows all of the medicines, vitamins, herbal products, and supplements you take. Taking some medicines together can cause problems. Where can you learn more? Go to http://lo-jennie.info/. Enter Quique Garzon in the search box to learn more about \"Learning About Metformin for Type 2 Diabetes. \"  Current as of: December 7, 2017  Content Version: 11.7  © 8920-2744 [employer]. Care instructions adapted under license by [employer] (which disclaims liability or warranty for this information). If you have questions about a medical condition or this instruction, always ask your healthcare professional. [address] any warranty or liability for your use of this information.

## 2018-07-31 NOTE — MR AVS SNAPSHOT
[facility] [address] [address] [facility] 
[phone] Patient: Natalya Pang 
MRN:  [id number] Visit Information Date & Time Provider Department Dept. Phone Encounter #  
 2018  9:30 AM Diana Engel, [address] [id number] Follow-up Instructions Return in about 6 weeks (around 2018), or if symptoms worsen or fail to improve. Upcoming Health Maintenance Date Due Influenza Age 5 to Adult 2018 FOBT Q 1 YEAR AGE 50-75 2018 BREAST CANCER SCRN MAMMOGRAM 2019 PAP AKA CERVICAL CYTOLOGY 2020 DTaP/Tdap/Td series (2 - Td) 2026 Allergies as of 2018  Review Complete On: 2018 By: Diana Engel MD  
  
 Severity Noted Reaction Type Reactions Entex [Phenylephrine-guaifenesin]  08/10/2010    Unknown (comments) Simvastatin  2013    Myalgia Current Immunizations  Reviewed on 2016 Name Date Influenza Vaccine (Quad) PF 2017, 2016 Not reviewed this visit You Were Diagnosed With   
  
 Codes Comments Obesity, morbid ([facility]    -  Primary ICD-10-CM: E66.01 
ICD-9-CM: 278.01 Recurrent depression ([facility]     ICD-10-CM: F33.9 ICD-9-CM: 296.30 Pure hypercholesterolemia     ICD-10-CM: E78.00 ICD-9-CM: 272.0 Mild intermittent asthma without complication     KNT-37-BJ: J45.20 ICD-9-CM: 493.90 Gastroesophageal reflux disease without esophagitis     ICD-10-CM: K21.9 ICD-9-CM: 530.81 Elevated glucose     ICD-10-CM: R73.09 
ICD-9-CM: 790.29 BMI 45.0-49.9, adult [facility])     ICD-10-CM: I37.71 
ICD-9-CM: V85.42 Vitamin D deficiency     ICD-10-CM: E55.9 ICD-9-CM: 268.9 Essential hypertension     ICD-10-CM: I10 
ICD-9-CM: 401.9 Vitals BP Pulse Temp Resp Height(growth percentile) Weight(growth percentile) 130/72 (BP 1 Location: Left arm, BP Patient Position: Sitting) 76 98.3 °F (36.8 °C) (Oral) 16 5' 8\" (1.727 m) 296 lb 3.2 oz (134.4 kg) SpO2 BMI OB Status Smoking Status 97% 45.04 kg/m2 Having regular periods Never Smoker Vitals History BMI and BSA Data Body Mass Index Body Surface Area 45.04 kg/m 2 2.54 m 2 Preferred Pharmacy Pharmacy Name Phone ON-SITE  - [place], [address] [facility] [phone] Your Updated Medication List  
  
   
This list is accurate as of 7/31/18 10:07 AM.  Always use your most recent med list.  
  
  
  
  
 albuterol 90 mcg/actuation inhaler Commonly known as:  PROVENTIL HFA, VENTOLIN HFA, PROAIR HFA Take 1-2 Puffs by inhalation every four (4) hours as needed for Wheezing or Shortness of Breath. ALPRAZolam 0.5 mg tablet Commonly known as:  Erik Beecham Take 1 Tab by mouth three (3) times daily as needed for Anxiety. Max Daily Amount: 1.5 mg.  
  
 amLODIPine 5 mg tablet Commonly known as:  Rivera Del Take 1 Tab by mouth daily. atorvastatin 10 mg tablet Commonly known as:  LIPITOR Take 1 Tab by mouth daily. B.infantis-B.ani-B.long-B.bifi 10-15 mg Tbec Take  by mouth.  
  
 ergocalciferol 50,000 unit capsule Commonly known as:  ERGOCALCIFEROL Take 1 Cap by mouth every seven (7) days. fluticasone 50 mcg/actuation nasal spray Commonly known as:  Domnick Means 2 Sprays by Both Nostrils route daily. fluticasone-Salmeterol 45-21 mcg/actuation inhaler Commonly known as:  ADVAIR HFA Take 2 Puffs by inhalation two (2) times a day. ibuprofen 200 mg tablet Commonly known as:  MOTRIN Take  by mouth every six (6) hours as needed. losartan 100 mg tablet Commonly known as:  COZAAR Take 1 Tab by mouth daily. metFORMIN  mg tablet Commonly known as:  GLUCOPHAGE XR Take 2 Tabs by mouth daily (with dinner). montelukast 10 mg tablet Commonly known as:  SINGULAIR Take 1 Tab by mouth daily. MULTIVITAMIN PO Take 2 Tabs by mouth daily. omeprazole 20 mg capsule Commonly known as:  PRILOSEC Take 1 Cap by mouth daily. POTASSIUM-CALCIUM-MAGNESIUM PO Take  by mouth. sertraline 100 mg tablet Commonly known as:  ZOLOFT Take 1 Tab by mouth daily. VITAMIN B-12 SL  
by SubLINGual route. VITAMIN D3 1,000 unit Cap Generic drug:  cholecalciferol Take 1,000 Units by mouth daily. ZYRTEC PO Take  by mouth daily. Prescriptions Sent to Pharmacy Refills  
 metFORMIN ER (GLUCOPHAGE XR) 500 mg tablet 0 Sig: Take 2 Tabs by mouth daily (with dinner). Class: Normal  
 Pharmacy: [facility] [address] [facility]] Ph #: [phone] Route: Oral  
 ergocalciferol (ERGOCALCIFEROL) 50,000 unit capsule 3 Sig: Take 1 Cap by mouth every seven (7) days. Class: Normal  
 Pharmacy: [facility] [address] [facility]] Ph #: [phone] Route: Oral  
  
Follow-up Instructions Return in about 6 weeks (around 9/11/2018), or if symptoms worsen or fail to improve. Patient Instructions Learning About Metformin for Type 2 Diabetes Introduction Metformin (such as Glucophage) is a medicine used to treat type 2 diabetes. It helps keep blood sugar levels on target. You may have tried to eat a healthy diet, lose weight, and get more exercise to keep your blood sugar in your target range. If those things do not help, you may take a medicine called metformin. It helps your body use insulin. This can help you control your blood sugar. You might take it on its own or with other medicines. When taken on its own, metformin should not cause low blood sugar or weight gain. Example · Metformin (Glucophage) Possible side effects Common side effects include: · Short-term nausea. · Not feeling hungry. · Diarrhea. · Increased gas in your belly. · A metallic taste. You may have side effects or reactions not listed here. Check the information that comes with your medicine. What to know about taking this medicine · Metformin does not usually cause low blood sugar. But you may get a low blood sugar when you take metformin and you exercise hard, drink alcohol, or you do not eat enough food. · Sometimes metformin is combined with other diabetes medicine. Some of these can cause low blood sugar. · If you need a test that uses a dye or you need to have surgery, be sure to tell all of your doctors that you take metformin. You may have to stop taking it before and after the test or surgery. · Over time, blood levels of vitamin B12 can decrease in some people who take metformin. Your body needs this B vitamin to make blood cells. It also keeps your nervous system healthy. If you have been taking metformin for more than a few years, ask your doctor if you need a B12 blood test to measure the amount of vitamin B12 in your blood. · Be safe with medicines. Take your medicines exactly as prescribed. Call your doctor if you think you are having a problem with your medicine. · Check with your doctor or pharmacist before you use any other medicines. This includes over-the-counter medicines. Make sure your doctor knows all of the medicines, vitamins, herbal products, and supplements you take. Taking some medicines together can cause problems. Where can you learn more? Go to http://lo-jennie.info/. Enter Griselda Mills in the search box to learn more about \"Learning About Metformin for Type 2 Diabetes. \" Current as of: December 7, 2017 Content Version: 11.7 © 0405-5446 [employer]. Care instructions adapted under license by [employer] (which disclaims liability or warranty for this information).  If you have questions about a medical condition or this instruction, always ask your healthcare professional. [address] any warranty or liability for your use of this information. Introducing [address]! Dear Adiel Hair: Thank you for requesting a [email or web address] account. Our records indicate that you already have an active [email or web address] account. You can access your account anytime at [email or web address] Did you know that you can access your hospital and ER discharge instructions at any time in [email or web address]? You can also review all of your test results from your hospital stay or ER visit. Additional Information If you have questions, please visit the Frequently Asked Questions section of the [email or web address] website at [email or web address] Remember, [email or web address] is NOT to be used for urgent needs. For medical emergencies, dial 911. Now available from your iPhone and Android! Please provide this summary of care documentation to your next provider. Your primary care clinician is listed as Gelacio Grajeda. If you have any questions after today's visit, please call [phone].

## 2018-07-31 NOTE — PROGRESS NOTES
1. Have you been to the ER, urgent care clinic since your last visit? Hospitalized since your last visit? No    2. Have you seen or consulted any other health care providers outside of the [address] [place] [address] since your last visit? Include any pap smears or colon screening.  No    Chief Complaint   Patient presents with    Asthma    Depression    Hypertension    Cholesterol Problem    Vitamin D Deficiency    Other     Pre-DM    Dizziness     times 1 week, \"thinks its vertigo\"    Ankle Pain     LT ankle pain

## 2018-11-26 NOTE — TELEPHONE ENCOUNTER
This pharmacy faxed over request for the following prescriptions to be filled:    Medication requested :   Requested Prescriptions     Pending Prescriptions Disp Refills    sertraline (ZOLOFT) 100 mg tablet 90 Tab 1     Sig: Take 1 Tab by mouth daily.      PCP: [address] [place] [address] or Print: ON Site   Mail order or Local pharmacy [address] [facility],   [address] [facility]    Scheduled appointment if not seen by current providers in office:  LOV 7/31/2018 f/u 1/8/2019

## 2018-11-27 RX ORDER — SERTRALINE HYDROCHLORIDE 100 MG/1
100 TABLET, FILM COATED ORAL DAILY
Qty: 90 TAB | Refills: 1 | Status: SHIPPED | OUTPATIENT
Start: 2018-11-27 | End: 2019-05-24 | Stop reason: SDUPTHER

## 2018-12-24 DIAGNOSIS — I10 ESSENTIAL HYPERTENSION: ICD-10-CM

## 2018-12-24 DIAGNOSIS — J45.20 MILD INTERMITTENT ASTHMA WITHOUT COMPLICATION: ICD-10-CM

## 2018-12-26 RX ORDER — MONTELUKAST SODIUM 10 MG/1
10 TABLET ORAL DAILY
Qty: 90 TAB | Refills: 3 | Status: SHIPPED | OUTPATIENT
Start: 2018-12-26 | End: 2019-06-17 | Stop reason: SDUPTHER

## 2019-01-08 ENCOUNTER — OFFICE VISIT (OUTPATIENT)
Dept: FAMILY MEDICINE CLINIC | Age: 54
End: 2019-01-08

## 2019-01-08 VITALS
OXYGEN SATURATION: 98 % | HEART RATE: 80 BPM | TEMPERATURE: 98 F | RESPIRATION RATE: 15 BRPM | WEIGHT: 293 LBS | HEIGHT: 68 IN | SYSTOLIC BLOOD PRESSURE: 136 MMHG | BODY MASS INDEX: 44.41 KG/M2 | DIASTOLIC BLOOD PRESSURE: 88 MMHG

## 2019-01-08 DIAGNOSIS — R73.02 IMPAIRED GLUCOSE TOLERANCE: ICD-10-CM

## 2019-01-08 DIAGNOSIS — Z98.84 S/P GASTRIC BYPASS: ICD-10-CM

## 2019-01-08 DIAGNOSIS — F41.9 ANXIETY: Primary | ICD-10-CM

## 2019-01-08 DIAGNOSIS — E66.01 OBESITY, MORBID (HCC): ICD-10-CM

## 2019-01-08 DIAGNOSIS — K90.9 INTESTINAL MALABSORPTION, UNSPECIFIED TYPE: ICD-10-CM

## 2019-01-08 DIAGNOSIS — E78.00 PURE HYPERCHOLESTEROLEMIA: ICD-10-CM

## 2019-01-08 DIAGNOSIS — E55.9 VITAMIN D DEFICIENCY: ICD-10-CM

## 2019-01-08 DIAGNOSIS — Z23 ENCOUNTER FOR IMMUNIZATION: ICD-10-CM

## 2019-01-08 DIAGNOSIS — F33.9 RECURRENT DEPRESSION (HCC): ICD-10-CM

## 2019-01-08 DIAGNOSIS — E78.5 HYPERLIPIDEMIA LDL GOAL <100: ICD-10-CM

## 2019-01-08 DIAGNOSIS — R73.03 PREDIABETES: ICD-10-CM

## 2019-01-08 DIAGNOSIS — K21.9 GASTROESOPHAGEAL REFLUX DISEASE WITHOUT ESOPHAGITIS: ICD-10-CM

## 2019-01-08 DIAGNOSIS — I10 ESSENTIAL HYPERTENSION: ICD-10-CM

## 2019-01-08 RX ORDER — ALBUTEROL SULFATE 90 UG/1
1-2 AEROSOL, METERED RESPIRATORY (INHALATION)
Qty: 3 INHALER | Refills: 3 | Status: SHIPPED | OUTPATIENT
Start: 2019-01-08 | End: 2020-04-14 | Stop reason: SDUPTHER

## 2019-01-08 RX ORDER — METFORMIN HYDROCHLORIDE 500 MG/1
1000 TABLET, EXTENDED RELEASE ORAL
Qty: 180 TAB | Refills: 0 | Status: SHIPPED | OUTPATIENT
Start: 2019-01-08 | End: 2019-03-14 | Stop reason: SDUPTHER

## 2019-01-08 RX ORDER — EZETIMIBE 10 MG/1
10 TABLET ORAL DAILY
Qty: 90 TAB | Refills: 0 | Status: SHIPPED | OUTPATIENT
Start: 2019-01-08 | End: 2019-07-29 | Stop reason: SDUPTHER

## 2019-01-08 RX ORDER — ALPRAZOLAM 0.5 MG/1
0.5 TABLET ORAL
Qty: 12 TAB | Refills: 0 | Status: SHIPPED | OUTPATIENT
Start: 2019-01-08 | End: 2020-01-27 | Stop reason: SDUPTHER

## 2019-01-08 NOTE — PATIENT INSTRUCTIONS

## 2019-01-08 NOTE — PROGRESS NOTES
1. Have you been to the ER, urgent care clinic since your last visit? Hospitalized since your last visit? No    2. Have you seen or consulted any other health care providers outside of the [address] [place] [address] since your last visit? Include any pap smears or colon screening. Gynecology Dr. Sabina Oglesby 8/2018    Chief Complaint   Patient presents with    Hypertension    Cholesterol Problem    Vitamin D Deficiency    GERD    Asthma     Seasonal influenza vaccine 0.5 ml given IM in LT deltoid. Lot # [id number], exp date 06/30/2019. Patient tolerated injection well. No adverse reaction noted.

## 2019-01-09 ENCOUNTER — TELEPHONE (OUTPATIENT)
Dept: FAMILY MEDICINE CLINIC | Age: 54
End: 2019-01-09

## 2019-01-09 NOTE — TELEPHONE ENCOUNTER
[facility] is requesting a prior auth for RX Ezetimibe. Please go to key. [employer]quvamymeds. [employer] and use KEY:[id number].  Thank you    Sent fax request to nurses

## 2019-01-10 NOTE — PROGRESS NOTES
Pranav Turcios, 47 y.o.,  female    SUBJECTIVE  Ff-up    Prediabetes- started metformin last visit in July and reports tolerating ok. Did not ff-up as recommended in august.     HL- tried crestor and was causing mylagia improved after d/andrew. Reports myalgia on lipitor, simvastatin and red yeast rice previously, difficulty taking these meds. Reports father CAD/CABG    Depression/anxiety-about the same. Teacher, strained relationship at work  Continues to take 100 mg zoloft qday, improved with prn xanax    HTN- taking medications    GERD- doing well on prilosec. asthma is doing well on advair, hardly uses her albuterol. On singulair and zyrtec as well for allergies. Vit d def- she is s/p gastric bypass    Per pt utd with gyne care with dr. Montell Dance:  See HPI, all others negative        Patient Active Problem List   Diagnosis Code    HTN (hypertension) I10    Allergic rhinitis J30.9    Depression F32.9    S/P gastric bypass Z98.84    Anxiety F41.9    Iron deficiency E61.1    Vitamin D deficiency E55.9    Irregular menses N92.6    Vertigo R42    Onychomycosis B35.1    Impaired glucose tolerance R73.02    Rosacea L71.9    Gastroesophageal reflux disease without esophagitis K21.9    Need for Tdap vaccination Z23    Asthma J45.909    Obesity, morbid ([address] E66.01    Recurrent depression ([address] F33.9    Pure hypercholesterolemia E78.00       Current Outpatient Medications   Medication Sig Dispense Refill    albuterol (PROVENTIL HFA, VENTOLIN HFA, PROAIR HFA) 90 mcg/actuation inhaler Take 1-2 Puffs by inhalation every four (4) hours as needed for Wheezing or Shortness of Breath. 3 Inhaler 3    ALPRAZolam (XANAX) 0.5 mg tablet Take 1 Tab by mouth three (3) times daily as needed for Anxiety. Max Daily Amount: 1.5 mg. 12 Tab 0    metFORMIN ER (GLUCOPHAGE XR) 500 mg tablet Take 2 Tabs by mouth daily (with dinner). 180 Tab 0    ezetimibe (ZETIA) 10 mg tablet Take 1 Tab by mouth daily.  90 Tab 0    montelukast (SINGULAIR) 10 mg tablet Take 1 Tab by mouth daily. 90 Tab 3    sertraline (ZOLOFT) 100 mg tablet Take 1 Tab by mouth daily. 90 Tab 1    fluticasone-Salmeterol (ADVAIR HFA) 45-21 mcg/actuation inhaler Take 2 Puffs by inhalation two (2) times a day. 3 Inhaler 1    omeprazole (PRILOSEC) 20 mg capsule Take 1 Cap by mouth daily. 90 Cap 3    losartan (COZAAR) 100 mg tablet Take 1 Tab by mouth daily. 90 Tab 0    ergocalciferol (ERGOCALCIFEROL) 50,000 unit capsule Take 1 Cap by mouth every seven (7) days. 12 Cap 3    amLODIPine (NORVASC) 5 mg tablet Take 1 Tab by mouth daily. 90 Tab 2    fluticasone (FLONASE) 50 mcg/actuation nasal spray 2 Sprays by Both Nostrils route daily. 3 Bottle 3    CETIRIZINE HCL (ZYRTEC PO) Take  by mouth daily.  ibuprofen (MOTRIN) 200 mg tablet Take  by mouth every six (6) hours as needed.  MULTIVITAMIN PO Take 2 Tabs by mouth daily.  CYANOCOBALAMIN (VITAMIN B-12 SL) by SubLINGual route.  rosuvastatin (CRESTOR) 5 mg tablet Take 1 Tab by mouth nightly. 90 Tab 0    cholecalciferol (VITAMIN D3) 1,000 unit cap Take 1,000 Units by mouth daily.  POTASSIUM-CALCIUM-MAGNESIUM PO Take  by mouth.  B.infantis-B.ani-B.long-B.bifi 10-15 mg TbEC Take  by mouth.          Allergies   Allergen Reactions    Entex [Phenylephrine-Guaifenesin] Unknown (comments)    Simvastatin Myalgia       Past Medical History:   Diagnosis Date    Asthma     Depression     Anxiety    GERD (gastroesophageal reflux disease)     Hypercholesterolemia     Hypertension        Social History     Socioeconomic History    Marital status:      Spouse name: Not on file    Number of children: Not on file    Years of education: Not on file    Highest education level: Not on file   Social Needs    Financial resource strain: Not on file    Food insecurity - worry: Not on file    Food insecurity - inability: Not on file   [employer] needs - medical: Not on file   [place] [employer] needs - non-medical: Not on file   Occupational History    Not on file   Tobacco Use    Smoking status: Never Smoker    Smokeless tobacco: Never Used   Substance and Sexual Activity    Alcohol use: Yes     Comment: 2 drinks per month    Drug use: No    Sexual activity: Not Currently     Partners: Male   Other Topics Concern    Not on file   Social History Narrative    Not on file       Family History   Problem Relation Age of Onset    Asthma Mother     High Cholesterol Mother     Hypertension Mother     Thyroid Disease Mother     Cancer Mother 79        Thyroid Cancer    Depression Father     High Cholesterol Father     Hypertension Father     Colon Polyps Father     Depression Brother     High Cholesterol Brother     Hypertension Brother     Breast Cancer Maternal Grandmother     High Cholesterol Maternal Grandmother     Hypertension Maternal Grandmother     Diabetes Maternal Grandmother     Cancer Maternal Grandfather         prostate    High Cholesterol Maternal Grandfather     Hypertension Maternal Grandfather     Diabetes Maternal Grandfather     High Cholesterol Paternal Grandmother     Hypertension Paternal Grandmother     Diabetes Paternal Grandmother     Thyroid Disease Paternal Grandmother     High Cholesterol Paternal Grandfather     Hypertension Paternal Grandfather     Diabetes Paternal Grandfather          OBJECTIVE    Physical Exam:     Visit Vitals  /88 (BP 1 Location: Right arm, BP Patient Position: Sitting)   Pulse 80   Temp 98 °F (36.7 °C) (Oral)   Resp 15   Ht 5' 8\" (1.727 m)   Wt 297 lb 12.8 oz (135.1 kg)   SpO2 98%   BMI 45.28 kg/m²       General: alert, obese,  in no apparent distress or pain  CVS: normal rate, regular rhythm, distinct S1 and S2  Lungs:clear to ausculation bilaterally, no crackles, wheezing or rhonchi noted  Skin: warm, no lesions, rashes noted  Psych:  mood and affect normal        ASSESSMENT/PLAN  Tj Maddox was seen today for other, hypertension, anxiety and depression. Diagnoses and all orders for this visit:    prediabetes  cont metformin XR  Will recheck a1c/cmp/lipid panel  ADA diet discussed    Essential hypertension-  Controlled, cont losartan  and norvasc.   monitoring    Hyperlipidemia  intolorant to lipitor, zocor, crestor and red yeast rice  Try zetia 10 mg  Father with CAD and her metabolic syndrome will need to be more aggressive with lipid management    S/P gastric bypass  Check malabsorption labs (vit d, b12, folate, b1, iron profile, ferritin)  Encouraged wt loss/    Vitamin D Deficiency  Persistently low, s/p gastric bypass  Advised 50k weekly year round    Major Depression  Fair control,  cont 100 mg zoloft qd  Given short course xanax   Consider increasing zoloft dose to 200 mg    Anxiety    Allergic rhinitis, unspecified allergic rhinitis type  Cont singulair/zyrtec    Gastroesophageal reflux disease without esophagitis-  Stable, Cont PPI    Asthma, mild intermittent, uncomplicated  Controlled, cont advair, prn albuterol  Declines PCV vaccine, annual flu vaccine UTD    Morbid Obesity HCC  Encouraged wt loss/diet    Encounter for vaccine  Flu vaccine given    Follow-up Disposition:  Return in about 4 weeks (around 2/5/2019), or if symptoms worsen or fail to improve. Patient understands plan of care. Patient has provided input and agrees with goals.

## 2019-01-10 NOTE — TELEPHONE ENCOUNTER
Request for Zetia has been approved per [facility]. [facility] aware of approval.  Patient identified with 2 identifiers (name and ).   Patient aware of medication approval.

## 2019-01-21 ENCOUNTER — HOSPITAL ENCOUNTER (OUTPATIENT)
Dept: LAB | Age: 54
Discharge: HOME | End: 2019-01-21

## 2019-01-21 LAB — XX-LABCORP SPECIMEN COL,LCBCF: NORMAL

## 2019-01-21 PROCEDURE — 99001 SPECIMEN HANDLING PT-LAB: CPT

## 2019-01-23 LAB
25(OH)D3+25(OH)D2 SERPL-MCNC: 17.2 NG/ML (ref 30–100)
ALBUMIN SERPL-MCNC: 3.9 G/DL (ref 3.5–5.5)
ALBUMIN/GLOB SERPL: 2 {RATIO} (ref 1.2–2.2)
ALP SERPL-CCNC: 117 IU/L (ref 39–117)
ALT SERPL-CCNC: 22 IU/L (ref 0–32)
AST SERPL-CCNC: 13 IU/L (ref 0–40)
BILIRUB SERPL-MCNC: 0.3 MG/DL (ref 0–1.2)
BUN SERPL-MCNC: 12 MG/DL (ref 6–24)
BUN/CREAT SERPL: 18 (ref 9–23)
CALCIUM SERPL-MCNC: 8.7 MG/DL (ref 8.7–10.2)
CHLORIDE SERPL-SCNC: 104 MMOL/L (ref 96–106)
CHOLEST SERPL-MCNC: 240 MG/DL (ref 100–199)
CO2 SERPL-SCNC: 22 MMOL/L (ref 20–29)
CREAT SERPL-MCNC: 0.67 MG/DL (ref 0.57–1)
FERRITIN SERPL-MCNC: 26 NG/ML (ref 15–150)
FOLATE SERPL-MCNC: 4.3 NG/ML
GLOBULIN SER CALC-MCNC: 2 G/DL (ref 1.5–4.5)
GLUCOSE SERPL-MCNC: 125 MG/DL (ref 65–99)
HBA1C MFR BLD: 7.2 % (ref 4.8–5.6)
HDLC SERPL-MCNC: 43 MG/DL
INTERPRETATION, 910389: NORMAL
IRON SATN MFR SERPL: 15 % (ref 15–55)
IRON SERPL-MCNC: 55 UG/DL (ref 27–159)
LDLC SERPL CALC-MCNC: 153 MG/DL (ref 0–99)
Lab: NORMAL
POTASSIUM SERPL-SCNC: 4.2 MMOL/L (ref 3.5–5.2)
PROT SERPL-MCNC: 5.9 G/DL (ref 6–8.5)
SODIUM SERPL-SCNC: 143 MMOL/L (ref 134–144)
SPECIMEN STATUS REPORT, ROLRST: NORMAL
TIBC SERPL-MCNC: 368 UG/DL (ref 250–450)
TRIGL SERPL-MCNC: 219 MG/DL (ref 0–149)
UIBC SERPL-MCNC: 313 UG/DL (ref 131–425)
VIT B1 BLD-SCNC: 136.3 NMOL/L (ref 66.5–200)
VIT B12 SERPL-MCNC: 1107 PG/ML (ref 232–1245)
VLDLC SERPL CALC-MCNC: 44 MG/DL (ref 5–40)

## 2019-01-28 NOTE — TELEPHONE ENCOUNTER
This pharmacy faxed over request for the following prescriptions to be filled:    Medication requested :   Requested Prescriptions     Pending Prescriptions Disp Refills    losartan (COZAAR) 100 mg tablet 90 Tab 0     Sig: Take 1 Tab by mouth daily. PCP: [address] [place] [address] or Print: On Site   Mail order or Local pharmacy [facility],   [phone] Count includes the [facility]    Scheduled appointment if not seen by current providers in office:  LOV 1/2/2019 No f/u up Scheduled at this time.   Due 2/5/2019

## 2019-01-29 RX ORDER — LOSARTAN POTASSIUM 100 MG/1
100 TABLET ORAL DAILY
Qty: 90 TAB | Refills: 0 | Status: SHIPPED | OUTPATIENT
Start: 2019-01-29 | End: 2019-05-01 | Stop reason: SDUPTHER

## 2019-02-15 NOTE — PROGRESS NOTES
Contacted patient and verified identity using name and date of birth (2- identifiers). Patient advised + vid d def, uncontrolled dm/cholesterol. Appointment scheduled for 3/8/19 to discuss further.

## 2019-03-08 ENCOUNTER — OFFICE VISIT (OUTPATIENT)
Dept: FAMILY MEDICINE CLINIC | Age: 54
End: 2019-03-08

## 2019-03-08 VITALS
TEMPERATURE: 98.2 F | HEART RATE: 74 BPM | HEIGHT: 68 IN | OXYGEN SATURATION: 98 % | RESPIRATION RATE: 16 BRPM | WEIGHT: 293 LBS | SYSTOLIC BLOOD PRESSURE: 138 MMHG | DIASTOLIC BLOOD PRESSURE: 96 MMHG | BODY MASS INDEX: 44.41 KG/M2

## 2019-03-08 DIAGNOSIS — E78.00 PURE HYPERCHOLESTEROLEMIA: ICD-10-CM

## 2019-03-08 DIAGNOSIS — K21.9 GASTROESOPHAGEAL REFLUX DISEASE WITHOUT ESOPHAGITIS: ICD-10-CM

## 2019-03-08 DIAGNOSIS — E11.9 DIABETES MELLITUS WITHOUT COMPLICATION (HCC): ICD-10-CM

## 2019-03-08 DIAGNOSIS — I10 ESSENTIAL HYPERTENSION: Primary | ICD-10-CM

## 2019-03-08 DIAGNOSIS — F33.9 RECURRENT DEPRESSION (HCC): ICD-10-CM

## 2019-03-08 DIAGNOSIS — F41.9 ANXIETY: ICD-10-CM

## 2019-03-08 DIAGNOSIS — E55.9 VITAMIN D DEFICIENCY: ICD-10-CM

## 2019-03-08 DIAGNOSIS — J45.20 MILD INTERMITTENT ASTHMA WITHOUT COMPLICATION: ICD-10-CM

## 2019-03-08 RX ORDER — ERGOCALCIFEROL 1.25 MG/1
50000 CAPSULE ORAL
Qty: 24 CAP | Refills: 3 | Status: SHIPPED | OUTPATIENT
Start: 2019-03-09 | End: 2019-06-17 | Stop reason: SDUPTHER

## 2019-03-08 NOTE — PROGRESS NOTES
Oliver De Jesus, 47 y.o.,  female    SUBJECTIVE  Ff-up    DM- reports better compliance with metformin. Reviewed labs. a1c about the same 7.2  She does not want increase dose or add another medication. She says has been cutting down on sugary drinks and wants to work on weight loss. She does not check home glucose levels. HL-switched to zetia on last vist, only taking about twice a week, thinks causing back pains. Reports myalgia on crestor, lipitor, simvastatin and red yeast rice previously. Reports father CAD/CABG    Depression/anxiety-she is crying during visit, says has been a long busy week. She does report this occurs daily. She is teacher, strained relationship at work. Continues to take 100 mg zoloft qday. She denies harmful ideation. HTN- taking medications    GERD- doing well on prilosec. asthma is doing well on advair, hardly uses her albuterol. On singulair and zyrtec as well for allergies. Vit d def- she is s/p gastric bypass, says taking 5000 iu daily    Per pt utd with gyne care with dr. Ilda Han:  See HPI, all others negative        Patient Active Problem List   Diagnosis Code    HTN (hypertension) I10    Allergic rhinitis J30.9    Depression F32.9    S/P gastric bypass Z98.84    Anxiety F41.9    Iron deficiency E61.1    Vitamin D deficiency E55.9    Irregular menses N92.6    Vertigo R42    Onychomycosis B35.1    Impaired glucose tolerance R73.02    Rosacea L71.9    Gastroesophageal reflux disease without esophagitis K21.9    Need for Tdap vaccination Z23    Asthma J45.909    Obesity, morbid ([address] E66.01    Recurrent depression ([facility] F33.9    Pure hypercholesterolemia E78.00       Current Outpatient Medications   Medication Sig Dispense Refill    [START ON 3/9/2019] ergocalciferol (ERGOCALCIFEROL) 50,000 unit capsule Take 1 Cap by mouth two (2) days a week. 24 Cap 3    losartan (COZAAR) 100 mg tablet Take 1 Tab by mouth daily.  90 Tab 0    albuterol (PROVENTIL HFA, VENTOLIN HFA, PROAIR HFA) 90 mcg/actuation inhaler Take 1-2 Puffs by inhalation every four (4) hours as needed for Wheezing or Shortness of Breath. 3 Inhaler 3    ALPRAZolam (XANAX) 0.5 mg tablet Take 1 Tab by mouth three (3) times daily as needed for Anxiety. Max Daily Amount: 1.5 mg. 12 Tab 0    metFORMIN ER (GLUCOPHAGE XR) 500 mg tablet Take 2 Tabs by mouth daily (with dinner). 180 Tab 0    ezetimibe (ZETIA) 10 mg tablet Take 1 Tab by mouth daily. 90 Tab 0    montelukast (SINGULAIR) 10 mg tablet Take 1 Tab by mouth daily. 90 Tab 3    sertraline (ZOLOFT) 100 mg tablet Take 1 Tab by mouth daily. 90 Tab 1    fluticasone-Salmeterol (ADVAIR HFA) 45-21 mcg/actuation inhaler Take 2 Puffs by inhalation two (2) times a day. 3 Inhaler 1    omeprazole (PRILOSEC) 20 mg capsule Take 1 Cap by mouth daily. 90 Cap 3    amLODIPine (NORVASC) 5 mg tablet Take 1 Tab by mouth daily. 90 Tab 2    fluticasone (FLONASE) 50 mcg/actuation nasal spray 2 Sprays by Both Nostrils route daily. 3 Bottle 3    CETIRIZINE HCL (ZYRTEC PO) Take  by mouth daily.  ibuprofen (MOTRIN) 200 mg tablet Take  by mouth every six (6) hours as needed.  MULTIVITAMIN PO Take 2 Tabs by mouth daily.  CYANOCOBALAMIN (VITAMIN B-12 SL) by SubLINGual route.  rosuvastatin (CRESTOR) 5 mg tablet Take 1 Tab by mouth nightly. 90 Tab 0    POTASSIUM-CALCIUM-MAGNESIUM PO Take  by mouth.  B.infantis-B.ani-B.long-B.bifi 10-15 mg TbEC Take  by mouth.          Allergies   Allergen Reactions    Entex [Phenylephrine-Guaifenesin] Unknown (comments)    Simvastatin Myalgia       Past Medical History:   Diagnosis Date    Asthma     Depression     Anxiety    GERD (gastroesophageal reflux disease)     Hypercholesterolemia     Hypertension        Social History     Socioeconomic History    Marital status:      Spouse name: Not on file    Number of children: Not on file    Years of education: Not on file    Highest education level: Not on file   Social Needs    Financial resource strain: Not on file    Food insecurity - worry: Not on file    Food insecurity - inability: Not on file    Transportation needs - medical: Not on file   [employer] needs - non-medical: Not on file   Occupational History    Not on file   Tobacco Use    Smoking status: Never Smoker    Smokeless tobacco: Never Used   Substance and Sexual Activity    Alcohol use: Yes     Comment: 2 drinks per month    Drug use: No    Sexual activity: Not Currently     Partners: Male   Other Topics Concern    Not on file   Social History Narrative    Not on file       Family History   Problem Relation Age of Onset    Asthma Mother     High Cholesterol Mother     Hypertension Mother     Thyroid Disease Mother     Cancer Mother 79        Thyroid Cancer    Depression Father     High Cholesterol Father     Hypertension Father     Colon Polyps Father     Depression Brother     High Cholesterol Brother     Hypertension Brother     Breast Cancer Maternal Grandmother     High Cholesterol Maternal Grandmother     Hypertension Maternal Grandmother     Diabetes Maternal Grandmother     Cancer Maternal Grandfather         prostate    High Cholesterol Maternal Grandfather     Hypertension Maternal Grandfather     Diabetes Maternal Grandfather     High Cholesterol Paternal Grandmother     Hypertension Paternal Grandmother     Diabetes Paternal Grandmother     Thyroid Disease Paternal Grandmother     High Cholesterol Paternal Grandfather     Hypertension Paternal Grandfather     Diabetes Paternal Grandfather          OBJECTIVE    Physical Exam:     Visit Vitals  BP (!) 138/86 (BP 1 Location: Left arm, BP Patient Position: Sitting)   Pulse 74   Temp 98.2 °F (36.8 °C) (Oral)   Resp 16   Ht 5' 8\" (1.727 m)   Wt 299 lb (135.6 kg)   SpO2 98%   BMI 45.46 kg/m²       General: alert, obese,  in no apparent distress or pain  CVS: normal rate, regular rhythm, distinct S1 and S2  Lungs:clear to ausculation bilaterally, no crackles, wheezing or rhonchi noted  Feet: no lesions, normal monofilament  Skin: warm, no lesions, rashes noted  Psych:  mood and affect normal    Results for orders placed or performed during the hospital encounter of 01/21/19   [facility] SPECIMEN COL   Result Value Ref Range    [facility] SPEC[facility]. Specimens collected/sent to [facility]. Please direct inquiries to ([phone]). ASSESSMENT/PLAN  Emily Jama was seen today for other, hypertension, anxiety and depression. Diagnoses and all orders for this visit:    Diabetes mellitus without complication  suboptimal 7.2  Advised to increase metformin XR dose from 1000 mg  to 2000 mg daily, she wants to work on dietary changes for now  Will recheck a1c/cmp/lipid panel/microalbumin next month  ADA diet discussed    Essential hypertension-  Controlled, cont losartan  and norvasc.   monitoring    Hyperlipidemia  Not at goal LDL< 100  Currently on zetia  intolorant to crestor, lipitor, zocor, crestor and red yeast rice    S/P gastric bypass    Vitamin D Deficiency  Persistently low, s/p gastric bypass  Advised to increase vitamin d to 50k twice a week    Major Depression  Needs better control  Increase zoloft to 150 mg zoloft every day (1.5 pills)    Anxiety    Allergic rhinitis, unspecified allergic rhinitis type  Cont singulair/zyrtec    Gastroesophageal reflux disease without esophagitis-  Stable, Cont PPI    Asthma, mild intermittent, uncomplicated  Controlled, cont advair, prn albuterol  Declines PCV vaccine, annual flu vaccine UTD    Morbid Obesity HCC  Encouraged wt loss/diet    Ff-up in 4 weeks or sooner prn    Follow-up Disposition:  Return in about 4 weeks (around 4/5/2019), or if symptoms worsen or fail to improve. Patient understands plan of care. Patient has provided input and agrees with goals.

## 2019-03-08 NOTE — PATIENT INSTRUCTIONS

## 2019-03-14 RX ORDER — METFORMIN HYDROCHLORIDE 500 MG/1
1000 TABLET, EXTENDED RELEASE ORAL
Qty: 180 TAB | Refills: 0 | Status: SHIPPED | OUTPATIENT
Start: 2019-03-14 | End: 2019-06-17 | Stop reason: SDUPTHER

## 2019-03-14 NOTE — TELEPHONE ENCOUNTER
This pharmacy faxed over request for the following prescriptions to be filled:    Medication requested :   Requested Prescriptions     Pending Prescriptions Disp Refills    metFORMIN ER (GLUCOPHAGE XR) 500 mg tablet 180 Tab 0     Sig: Take 2 Tabs by mouth daily (with dinner). PCP: [address] [place] [address] or Print: [facility]   Mail order or Local pharmacy Nasir Evans    Scheduled appointment if not seen by current providers in office: LOV 3/8/2019 No f/u up Scheduled at this time.   Due 4/5/2019

## 2019-05-01 NOTE — TELEPHONE ENCOUNTER
This pharmacy faxed over request for the following prescriptions to be filled:    Medication requested :   Requested Prescriptions     Pending Prescriptions Disp Refills    losartan (COZAAR) 100 mg tablet 90 Tab 0     Sig: Take 1 Tab by mouth daily. PCP: [address] [place] [address] or Print:  On Site   Mail order or Local pharmacy [address]     Scheduled appointment if not seen by current providers in office: LOV 3/8/2019 No f/u up Scheduled at this time.   Due 4/5/2019 LMOV to schedule f/u

## 2019-05-02 RX ORDER — LOSARTAN POTASSIUM 100 MG/1
100 TABLET ORAL DAILY
Qty: 90 TAB | Refills: 0 | Status: SHIPPED | OUTPATIENT
Start: 2019-05-02 | End: 2019-07-29 | Stop reason: SDUPTHER

## 2019-05-24 RX ORDER — SERTRALINE HYDROCHLORIDE 100 MG/1
150 TABLET, FILM COATED ORAL DAILY
Qty: 135 TAB | Refills: 0 | Status: SHIPPED | OUTPATIENT
Start: 2019-05-24 | End: 2019-09-15 | Stop reason: SDUPTHER

## 2019-05-24 NOTE — TELEPHONE ENCOUNTER
This pharmacy faxed over request for the following prescriptions to be filled:    Medication requested :   Requested Prescriptions     Pending Prescriptions Disp Refills    sertraline (ZOLOFT) 100 mg tablet 90 Tab 1     Sig: Take 1 Tab by mouth daily.        PCP: [address] or Print: [facility]  Mail order or Local pharmacy [phone]    Scheduled appointment if not seen by current providers in office: LOV: 03-08-19 NOV: 06-17-19

## 2019-06-08 ENCOUNTER — HOSPITAL ENCOUNTER (OUTPATIENT)
Dept: LAB | Age: 54
Discharge: HOME | End: 2019-06-08

## 2019-06-08 LAB — XX-LABCORP SPECIMEN COL,LCBCF: NORMAL

## 2019-06-08 PROCEDURE — 99001 SPECIMEN HANDLING PT-LAB: CPT

## 2019-06-10 LAB
25(OH)D3+25(OH)D2 SERPL-MCNC: 36.5 NG/ML (ref 30–100)
ALBUMIN SERPL-MCNC: 4 G/DL (ref 3.5–5.5)
ALBUMIN/CREAT UR: <3.1 MG/G CREAT (ref 0–30)
ALBUMIN/GLOB SERPL: 2 {RATIO} (ref 1.2–2.2)
ALP SERPL-CCNC: 128 IU/L (ref 39–117)
ALT SERPL-CCNC: 30 IU/L (ref 0–32)
AST SERPL-CCNC: 16 IU/L (ref 0–40)
BILIRUB SERPL-MCNC: 0.4 MG/DL (ref 0–1.2)
BUN SERPL-MCNC: 14 MG/DL (ref 6–24)
BUN/CREAT SERPL: 21 (ref 9–23)
CALCIUM SERPL-MCNC: 8.9 MG/DL (ref 8.7–10.2)
CHLORIDE SERPL-SCNC: 106 MMOL/L (ref 96–106)
CHOLEST SERPL-MCNC: 219 MG/DL (ref 100–199)
CO2 SERPL-SCNC: 22 MMOL/L (ref 20–29)
CREAT SERPL-MCNC: 0.66 MG/DL (ref 0.57–1)
CREAT UR-MCNC: 96.8 MG/DL
GLOBULIN SER CALC-MCNC: 2 G/DL (ref 1.5–4.5)
GLUCOSE SERPL-MCNC: 122 MG/DL (ref 65–99)
HBA1C MFR BLD: 7.4 % (ref 4.8–5.6)
HDLC SERPL-MCNC: 43 MG/DL
INTERPRETATION, 910389: NORMAL
LDLC SERPL CALC-MCNC: 132 MG/DL (ref 0–99)
Lab: NORMAL
MICROALBUMIN UR-MCNC: <3 UG/ML
POTASSIUM SERPL-SCNC: 4.4 MMOL/L (ref 3.5–5.2)
PROT SERPL-MCNC: 6 G/DL (ref 6–8.5)
SODIUM SERPL-SCNC: 144 MMOL/L (ref 134–144)
SPECIMEN STATUS REPORT, ROLRST: NORMAL
TRIGL SERPL-MCNC: 219 MG/DL (ref 0–149)
VLDLC SERPL CALC-MCNC: 44 MG/DL (ref 5–40)

## 2019-06-17 ENCOUNTER — OFFICE VISIT (OUTPATIENT)
Dept: FAMILY MEDICINE CLINIC | Age: 54
End: 2019-06-17

## 2019-06-17 ENCOUNTER — HOSPITAL ENCOUNTER (OUTPATIENT)
Dept: LAB | Age: 54
Discharge: HOME | End: 2019-06-17

## 2019-06-17 VITALS
TEMPERATURE: 98.8 F | DIASTOLIC BLOOD PRESSURE: 74 MMHG | BODY MASS INDEX: 44.41 KG/M2 | SYSTOLIC BLOOD PRESSURE: 104 MMHG | RESPIRATION RATE: 16 BRPM | HEART RATE: 77 BPM | HEIGHT: 68 IN | WEIGHT: 293 LBS | OXYGEN SATURATION: 95 %

## 2019-06-17 DIAGNOSIS — J45.20 MILD INTERMITTENT ASTHMA WITHOUT COMPLICATION: ICD-10-CM

## 2019-06-17 DIAGNOSIS — I10 ESSENTIAL HYPERTENSION: Primary | ICD-10-CM

## 2019-06-17 DIAGNOSIS — E78.00 PURE HYPERCHOLESTEROLEMIA: ICD-10-CM

## 2019-06-17 DIAGNOSIS — F41.9 ANXIETY: ICD-10-CM

## 2019-06-17 DIAGNOSIS — M25.50 POLYARTHRALGIA: ICD-10-CM

## 2019-06-17 DIAGNOSIS — K21.9 GASTROESOPHAGEAL REFLUX DISEASE WITHOUT ESOPHAGITIS: ICD-10-CM

## 2019-06-17 DIAGNOSIS — E66.01 OBESITY, MORBID (HCC): ICD-10-CM

## 2019-06-17 DIAGNOSIS — J30.2 SEASONAL ALLERGIC RHINITIS, UNSPECIFIED TRIGGER: ICD-10-CM

## 2019-06-17 DIAGNOSIS — E11.9 CONTROLLED TYPE 2 DIABETES MELLITUS WITHOUT COMPLICATION, WITHOUT LONG-TERM CURRENT USE OF INSULIN (HCC): ICD-10-CM

## 2019-06-17 DIAGNOSIS — F33.9 RECURRENT DEPRESSION (HCC): ICD-10-CM

## 2019-06-17 DIAGNOSIS — E55.9 VITAMIN D DEFICIENCY: ICD-10-CM

## 2019-06-17 LAB — XX-LABCORP SPECIMEN COL,LCBCF: NORMAL

## 2019-06-17 PROCEDURE — 99001 SPECIMEN HANDLING PT-LAB: CPT

## 2019-06-17 RX ORDER — ERGOCALCIFEROL 1.25 MG/1
50000 CAPSULE ORAL
Qty: 24 CAP | Refills: 3 | Status: SHIPPED | OUTPATIENT
Start: 2019-06-20 | End: 2020-04-14 | Stop reason: SDUPTHER

## 2019-06-17 RX ORDER — FLUTICASONE PROPIONATE 50 MCG
2 SPRAY, SUSPENSION (ML) NASAL DAILY
Qty: 3 BOTTLE | Refills: 3 | Status: SHIPPED | OUTPATIENT
Start: 2019-06-17 | End: 2020-04-14 | Stop reason: SDUPTHER

## 2019-06-17 RX ORDER — METFORMIN HYDROCHLORIDE 500 MG/1
2000 TABLET, EXTENDED RELEASE ORAL
Qty: 360 TAB | Refills: 0 | Status: SHIPPED | OUTPATIENT
Start: 2019-06-17 | End: 2019-09-15 | Stop reason: SDUPTHER

## 2019-06-17 RX ORDER — MONTELUKAST SODIUM 10 MG/1
10 TABLET ORAL DAILY
Qty: 90 TAB | Refills: 3 | Status: SHIPPED | OUTPATIENT
Start: 2019-06-17 | End: 2020-06-01 | Stop reason: SDUPTHER

## 2019-06-17 RX ORDER — METFORMIN HYDROCHLORIDE 500 MG/1
1000 TABLET, EXTENDED RELEASE ORAL
Qty: 180 TAB | Refills: 0 | Status: SHIPPED | OUTPATIENT
Start: 2019-06-17 | End: 2019-06-17 | Stop reason: DRUGHIGH

## 2019-06-17 NOTE — PROGRESS NOTES
1. Have you been to the ER, urgent care clinic since your last visit? Hospitalized since your last visit? No    2. Have you seen or consulted any other health care providers outside of the [address] [place] [address] since your last visit? Include any pap smears or colon screening. No    Chief Complaint   Patient presents with    Diabetes    Hypertension    Vitamin D Deficiency    Depression    GERD    Asthma     Last dm eye exam - scheduled 7/17/19 Dr. Cara Tena.

## 2019-06-17 NOTE — PATIENT INSTRUCTIONS

## 2019-06-17 NOTE — PROGRESS NOTES
Bhavya Funes, 47 y.o.,  female    SUBJECTIVE  Ff-up    DM- reports to take metformin regularly. Reviewed labs. a1c slightly over 7 since 2017, has been hesitant to taking more medication due to potential side effect profile. She says has been trying to be more active, no weight loss since last visit in march. She does not check home glucose levels. HL-reports tolerating zetia every other night, thinks causing back and muscle pains. Reports myalgia on crestor, lipitor, simvastatin and red yeast rice previously. 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 She has reduced zoloft to 100 mg qday from 150 mg    HTN- taking medications    GERD- doing well on prilosec. asthma is doing well on advair, hardly uses her albuterol. On singulair and zyrtec as well for allergies. Vit d def- she is s/p gastric bypass, says taking 50,000 iu vit d 2 x a week. C/o joint pains, episodic on different locations for the past few years. mentions R wrist, bilateral knees, bilateral hips lasting for a few days. No redness or swelling.  No trauma    Per pt utd with gyne care with dr. Yoselin Hutchinson:  See HPI, all others negative        Patient Active Problem List   Diagnosis Code    HTN (hypertension) I10    Allergic rhinitis J30.9    Depression F32.9    S/P gastric bypass Z98.84    Anxiety F41.9    Iron deficiency E61.1    Vitamin D deficiency E55.9    Irregular menses N92.6    Vertigo R42    Onychomycosis B35.1    Impaired glucose tolerance R73.02    Rosacea L71.9    Gastroesophageal reflux disease without esophagitis K21.9    Need for Tdap vaccination Z23    Asthma J45.909    Obesity, morbid ([address] E66.01    Recurrent depression ([address] F33.9    Pure hypercholesterolemia E78.00       Current Outpatient Medications   Medication Sig Dispense Refill    [START ON 6/20/2019] ergocalciferol (ERGOCALCIFEROL) 50,000 unit capsule Take 1 Cap by mouth two (2) days a week. 24 Cap 3    montelukast (SINGULAIR) 10 mg tablet Take 1 Tab by mouth daily. 90 Tab 3    fluticasone-Salmeterol (ADVAIR HFA) 45-21 mcg/actuation inhaler Take 2 Puffs by inhalation two (2) times a day. 3 Inhaler 1    fluticasone propionate (FLONASE) 50 mcg/actuation nasal spray 2 Sprays by Both Nostrils route daily. 3 Bottle 3    metFORMIN ER (GLUCOPHAGE XR) 500 mg tablet Take 4 Tabs by mouth daily (with dinner). 360 Tab 0    sertraline (ZOLOFT) 100 mg tablet Take 1.5 Tabs by mouth daily. 135 Tab 0    losartan (COZAAR) 100 mg tablet Take 1 Tab by mouth daily. 90 Tab 0    albuterol (PROVENTIL HFA, VENTOLIN HFA, PROAIR HFA) 90 mcg/actuation inhaler Take 1-2 Puffs by inhalation every four (4) hours as needed for Wheezing or Shortness of Breath. 3 Inhaler 3    ALPRAZolam (XANAX) 0.5 mg tablet Take 1 Tab by mouth three (3) times daily as needed for Anxiety. Max Daily Amount: 1.5 mg. 12 Tab 0    ezetimibe (ZETIA) 10 mg tablet Take 1 Tab by mouth daily. 90 Tab 0    omeprazole (PRILOSEC) 20 mg capsule Take 1 Cap by mouth daily. 90 Cap 3    amLODIPine (NORVASC) 5 mg tablet Take 1 Tab by mouth daily. 90 Tab 2    B.infantis-B.ani-B.long-B.bifi 10-15 mg TbEC Take  by mouth.  CETIRIZINE HCL (ZYRTEC PO) Take  by mouth daily.  ibuprofen (MOTRIN) 200 mg tablet Take 600 mg by mouth every six (6) hours as needed.  CYANOCOBALAMIN (VITAMIN B-12 SL) by SubLINGual route.          Allergies   Allergen Reactions    Entex [Phenylephrine-Guaifenesin] Unknown (comments)    Simvastatin Myalgia       Past Medical History:   Diagnosis Date    Asthma     Depression     Anxiety    GERD (gastroesophageal reflux disease)     Hypercholesterolemia     Hypertension        Social History     Socioeconomic History    Marital status:      Spouse name: Not on file    Number of children: Not on file    Years of education: Not on file    Highest education level: Not on file   Occupational History    Not on file   Social Needs    Financial resource strain: Not on file    Food insecurity:     Worry: Not on file     Inability: Not on file    Transportation needs:     Medical: Not on file     Non-medical: Not on file   Tobacco Use    Smoking status: Never Smoker    Smokeless tobacco: Never Used   Substance and Sexual Activity    Alcohol use: Yes     Comment: 2 drinks per month    Drug use: No    Sexual activity: Not Currently     Partners: Male   Lifestyle    Physical activity:     Days per week: Not on file     Minutes per session: Not on file    Stress: Not on file   Relationships    Social connections:     Talks on phone: Not on file     Gets together: Not on file     Attends [employer] service: Not on file     Active member of club or organization: Not on file     Attends meetings of clubs or organizations: Not on file     Relationship status: Not on file    Intimate partner violence:     Fear of current or ex partner: Not on file     Emotionally abused: Not on file     Physically abused: Not on file     Forced sexual activity: Not on file   Other Topics Concern    Not on file   Social History Narrative    Not on file       Family History   Problem Relation Age of Onset    Asthma Mother     High Cholesterol Mother     Hypertension Mother     Thyroid Disease Mother     Cancer Mother 79        Thyroid Cancer    Depression Father     High Cholesterol Father     Hypertension Father     Colon Polyps Father     Depression Brother     High Cholesterol Brother     Hypertension Brother     Breast Cancer Maternal Grandmother     High Cholesterol Maternal Grandmother     Hypertension Maternal Grandmother     Diabetes Maternal Grandmother     Cancer Maternal Grandfather         prostate    High Cholesterol Maternal Grandfather     Hypertension Maternal Grandfather     Diabetes Maternal Grandfather     High Cholesterol Paternal Grandmother     Hypertension Paternal Grandmother     Diabetes Paternal Grandmother     Thyroid Disease Paternal Grandmother     High Cholesterol Paternal Grandfather     Hypertension Paternal Grandfather     Diabetes Paternal Grandfather          OBJECTIVE    Physical Exam:     Visit Vitals  Visit Vitals  /74 (BP 1 Location: Left arm, BP Patient Position: Sitting)   Pulse 77   Temp 98.8 °F (37.1 °C) (Oral)   Resp 16   Ht 5' 8\" (1.727 m)   Wt 298 lb 9.6 oz (135.4 kg)   LMP 11/05/2016   SpO2 95%   BMI 45.40 kg/m²       General: alert, obese,  in no apparent distress or pain  CVS: normal rate, regular rhythm, distinct S1 and S2  Lungs:clear to ausculation bilaterally, no crackles, wheezing or rhonchi noted  Skin: warm, no lesions, rashes noted  Psych:  mood and affect normal    Results for orders placed or performed during the hospital encounter of 06/08/19   [facility] SPECIMEN COL   Result Value Ref Range    [facility] SPECIMEN [facility]LL[facility]. Specimens collected/sent to [facility]. Please direct inquiries to ([phone]). ASSESSMENT/PLAN  Marion Fletcher was seen today for other, hypertension, anxiety and depression.     Diagnoses and all orders for this visit:    Diabetes mellitus without complication, without long term current use of insulin  suboptimal 7.2>7.4  Advised to increase metformin XR dose from 1000 mg  to 2000 mg daily  ADA diet discussed  Due for eye exam, reminded, pt reports has scheduled appt with Dr. Tiffany Houser hypertension-  Controlled, cont losartan  and norvasc.   monitoring    Hyperlipidemia  Not at goal LDL< 100  Currently on zetia every other night which she has tolerating, there is some reduction in >130's  intolorant to crestor, lipitor, zocor, crestor and red yeast rice    S/P gastric bypass    Vitamin D Deficiency  Improved   s/p gastric bypass  Currently on  vitamin d to 50k twice a week    Recurrent Depression  Some improvement  Reduced zoloft to 100 mg zoloft every day Monitoring    Anxiety    Allergic rhinitis, unspecified allergic rhinitis type  Cont singulair/zyrtec    Gastroesophageal reflux disease without esophagitis-  Stable, Cont PPI    Asthma, mild intermittent, uncomplicated  Controlled, cont advair, prn albuterol  Declines PCV vaccine, annual flu vaccine UTD    Morbid Obesity HCC  Encouraged wt loss/diet    Polyarthralgia  R/o inflammatory arthritis  Check cbc, esr, RF, KEO    Ff-up in 3 months or sooner prn    Follow-up and Dispositions    · Return in about 3 months (around 9/17/2019), or if symptoms worsen or fail to improve. Patient understands plan of care. Patient has provided input and agrees with goals.

## 2019-06-21 LAB
14.3.3 ETA, RHEUM. ARTHRITIS: <0.2 NG/ML
BASOPHILS # BLD AUTO: 0 X10E3/UL (ref 0–0.2)
BASOPHILS NFR BLD AUTO: 0 %
CCP IGA+IGG SERPL IA-ACNC: <20 UNITS
CENTROMERE B AB SER-ACNC: <0.2 AI (ref 0–0.9)
CHROMATIN AB SERPL-ACNC: <0.2 AI (ref 0–0.9)
DSDNA AB SER-ACNC: 1 IU/ML (ref 0–9)
ENA JO1 AB SER-ACNC: <0.2 AI (ref 0–0.9)
ENA RNP AB SER-ACNC: 0.5 AI (ref 0–0.9)
ENA SCL70 AB SER-ACNC: <0.2 AI (ref 0–0.9)
ENA SM AB SER-ACNC: <0.2 AI (ref 0–0.9)
ENA SS-A AB SER-ACNC: <0.2 AI (ref 0–0.9)
ENA SS-B AB SER-ACNC: <0.2 AI (ref 0–0.9)
EOSINOPHIL # BLD AUTO: 0.2 X10E3/UL (ref 0–0.4)
EOSINOPHIL NFR BLD AUTO: 2 %
ERYTHROCYTE [DISTWIDTH] IN BLOOD BY AUTOMATED COUNT: 14.3 % (ref 12.3–15.4)
ERYTHROCYTE [SEDIMENTATION RATE] IN BLOOD BY WESTERGREN METHOD: 21 MM/HR (ref 0–40)
HCT VFR BLD AUTO: 40.5 % (ref 34–46.6)
HGB BLD-MCNC: 13.2 G/DL (ref 11.1–15.9)
IMM GRANULOCYTES # BLD AUTO: 0 X10E3/UL (ref 0–0.1)
IMM GRANULOCYTES NFR BLD AUTO: 0 %
LYMPHOCYTES # BLD AUTO: 1.5 X10E3/UL (ref 0.7–3.1)
LYMPHOCYTES NFR BLD AUTO: 15 %
MCH RBC QN AUTO: 28.3 PG (ref 26.6–33)
MCHC RBC AUTO-ENTMCNC: 32.6 G/DL (ref 31.5–35.7)
MCV RBC AUTO: 87 FL (ref 79–97)
MONOCYTES # BLD AUTO: 0.5 X10E3/UL (ref 0.1–0.9)
MONOCYTES NFR BLD AUTO: 5 %
NEUTROPHILS # BLD AUTO: 8.1 X10E3/UL (ref 1.4–7)
NEUTROPHILS NFR BLD AUTO: 78 %
PLATELET # BLD AUTO: 320 X10E3/UL (ref 150–450)
RBC # BLD AUTO: 4.67 X10E6/UL (ref 3.77–5.28)
RHEUMATOID FACT SERPL-ACNC: <14 UNITS/ML
SEE BELOW, 164869: NORMAL
WBC # BLD AUTO: 10.4 X10E3/UL (ref 3.4–10.8)

## 2019-07-30 RX ORDER — EZETIMIBE 10 MG/1
10 TABLET ORAL DAILY
Qty: 90 TAB | Refills: 0 | Status: SHIPPED | OUTPATIENT
Start: 2019-07-30 | End: 2020-04-14 | Stop reason: SDUPTHER

## 2019-07-30 RX ORDER — AMLODIPINE BESYLATE 5 MG/1
5 TABLET ORAL DAILY
Qty: 90 TAB | Refills: 2 | Status: SHIPPED | OUTPATIENT
Start: 2019-07-30 | End: 2020-04-14 | Stop reason: SDUPTHER

## 2019-07-30 RX ORDER — LOSARTAN POTASSIUM 100 MG/1
100 TABLET ORAL DAILY
Qty: 90 TAB | Refills: 0 | Status: SHIPPED | OUTPATIENT
Start: 2019-07-30 | End: 2019-10-20 | Stop reason: SDUPTHER

## 2019-07-30 RX ORDER — OMEPRAZOLE 20 MG/1
20 CAPSULE, DELAYED RELEASE ORAL DAILY
Qty: 90 CAP | Refills: 3 | Status: SHIPPED | OUTPATIENT
Start: 2019-07-30 | End: 2020-01-27 | Stop reason: SDUPTHER

## 2019-09-16 RX ORDER — METFORMIN HYDROCHLORIDE 500 MG/1
2000 TABLET, EXTENDED RELEASE ORAL
Qty: 360 TAB | Refills: 0 | Status: SHIPPED | OUTPATIENT
Start: 2019-09-16 | End: 2019-12-11 | Stop reason: SDUPTHER

## 2019-09-16 RX ORDER — SERTRALINE HYDROCHLORIDE 100 MG/1
150 TABLET, FILM COATED ORAL DAILY
Qty: 135 TAB | Refills: 0 | Status: SHIPPED | OUTPATIENT
Start: 2019-09-16 | End: 2020-01-27 | Stop reason: SDUPTHER

## 2019-11-26 RX ORDER — LOSARTAN POTASSIUM 100 MG/1
100 TABLET ORAL DAILY
Qty: 60 TAB | Refills: 0 | Status: SHIPPED | OUTPATIENT
Start: 2019-11-26 | End: 2020-01-27 | Stop reason: SDUPTHER

## 2019-11-26 NOTE — TELEPHONE ENCOUNTER
This pharmacy faxed over request for the following prescriptions to be filled:    Medication requested :   Requested Prescriptions     Pending Prescriptions Disp Refills    losartan (COZAAR) 100 mg tablet 30 Tab 0     Sig: Take 1 Tab by mouth daily. PCP: Ch[facility] Rom[id number] or Print: ON SITE   Mail order or Local pharmacy [address] [place] [address]    Scheduled appointment if not seen by current providers in office: LOV 6/17/2019 F/U 1/16/2020 PT STATES THAT SHE NEEDS THE RX BY 5PM TOMORROW .  GOING OUT OF TOWN

## 2019-12-11 NOTE — TELEPHONE ENCOUNTER
This pharmacy faxed over request for the following prescriptions to be filled:    Medication requested :   Requested Prescriptions     Pending Prescriptions Disp Refills    metFORMIN ER (GLUCOPHAGE XR) 500 mg tablet 360 Tab 0     Sig: Take 4 Tabs by mouth daily (with dinner).      PCP: [address] [place] [address] or Print: On Site   Mail order or Local pharmacy [address] [facility]     Scheduled appointment if not seen by current providers in office: LOV 6/17/2019 f/u 1/16/2019

## 2019-12-13 RX ORDER — METFORMIN HYDROCHLORIDE 500 MG/1
2000 TABLET, EXTENDED RELEASE ORAL
Qty: 360 TAB | Refills: 1 | Status: SHIPPED | OUTPATIENT
Start: 2019-12-13 | End: 2020-06-01 | Stop reason: SDUPTHER

## 2019-12-14 ENCOUNTER — PATIENT MESSAGE (OUTPATIENT)
Dept: FAMILY MEDICINE CLINIC | Age: 54
End: 2019-12-14

## 2019-12-18 NOTE — TELEPHONE ENCOUNTER
Online Visit    Date/Time: 9/15/2018 4:18:18 PM  To: QUINTON LOVE  From: TANYA GERBER  Subject: Please note Uncontrolled diabetes, and microalbumenuria, need evaluation ,please follow up to discuss, Dr. MELARA       Verified Results  COMP METABOLIC PANEL WITH CBCA, LIPID PANEL AND TSH (CMP,CBCA,LIPFA,TSH) [id number] 09:05AM TANYA GERBER     Test Name Result Flag Reference   WHITE BLOOD COUNT 5.5 K/mcL  4.2-11.0   RED CELL COUNT 5.09 mil/mcL  4.00-5.20   HEMOGLOBIN 13.2 g/dl  12.0-15.5   HEMATOCRIT 41.1 %  36.0-46.5   MEAN CORPUSCULAR VOLUME 80.7 fL  78.0-100.0   MEAN CORPUSCULAR HEMOGLOBIN 25.9 pg L 26.0-34.0   MEAN CORPUSCULAR HGB CONC 32.1 g/dl  32.0-36.5   RDW-CV 13.3 %  11.0-15.0   PLATELET COUNT 327 K/mcL  140-450   DAWSON% 50 %     LYM% 36 %     MON% 8 %     EOS% 4 %     BASO% 2 %     DAWSON ABS 2.7 K/mcL  1.8-7.7   LYM ABS 2.0 K/mcL  1.0-4.0   MON ABS 0.5 K/mcL  0.3-0.9   EOS ABS 0.2 K/mcL  0.1-0.5   BASO ABS 0.1 K/mcL  0.0-0.3   SODIUM 139 mmol/L  135-145   POTASSIUM 4.8 mmol/L  3.4-5.1   CHLORIDE 102 mmol/L     CARBON DIOXIDE 28 mmol/L  21-32   ANION GAP 14 mmol/L  10-20   GLUCOSE 216 mg/dl H 65-99   BUN 10 mg/dl  6-20   CREATININE 0.75 mg/dl  0.51-0.95   GFR EST.AFRICAN AMER >90     eGFR results = or >90 mL/min/1.73m2 = Normal kidney function.   GFR EST.NONAFRI AMER >90     eGFR results = or >90 mL/min/1.73m2 = Normal kidney function.   BUN/CREATININE RATIO 13  7-25   BILIRUBIN TOTAL 0.5 mg/dl  0.2-1.0   GOT/AST 19 Units/L  <38   ALKALINE PHOSPHATASE 78 Units/L     ALBUMIN 3.8 g/dl  3.6-5.1   TOTAL PROTEIN 7.2 g/dl  6.4-8.2   GLOBULIN (CALCULATED) 3.4 g/dl  2.0-4.0   A/G RATIO 1.1  1.0-2.4   CALCIUM 9.5 mg/dl  8.4-10.2   GPT/ALT 24 Units/L  <79   FASTING STATUS UNK hrs     CHOLESTEROL 186 mg/dl  <200   Desirable            <200  Borderline High      200 to 239  High                 >=240   HDL CHOLESTEROL 43 mg/dl L >49   Low            <40  Borderline Low 40 to 49  Near Optimal   50 to  Spoke with On Site pharmacist and verified prescription. Pharmacist states she has spoke to the patient as well. 59  Optimal        >=60   TRIGLYCERIDES 83 mg/dl  <150   Normal                   <150  Borderline High          150 to 199  High                     200 to 499  Very High                >=500   LDL CHOLESTEROL (CALCULATED) 126 mg/dl  <130   OPTIMAL               <100  NEAR OPTIMAL          100-129  BORDERLINE HIGH       130-159  HIGH                  160-189  VERY HIGH             >=190   NON-HDL CHOLESTEROL 143 mg/dl     Therapeutic Target:  CHD and risk equivalents <130  Multiple risk factors    <160  0 to 1 risk factors      <190   CHOLESTEROL/HDL RATIO 4.3  <4.5   TSH 0.949 mcUnits/mL  0.350-5.000   DIFF TYPE      AUTOMATED DIFFERENTIAL   FASTING STATUS UNK hrs     NRBC 0 /100 WBC  0   PERCENT IMMATURE GRANULOCYTES 0 %     ABSOLUTE IMMATURE GRANULOCYTES 0.0 K/mcL  0-0.2     HEMOGLOBIN A1C GLYCOSYLATED [id number] 09:05AM TANYA GERBER     Test Name Result Flag Reference   HEMOGLOBIN A1C GLYH 10.8 % H 4.5-5.6   ----DIABETIC SCREENING---  NON DIABETIC                 <5.7%  INCREASED RISK                5.7-6.4%  DIAGNOSTIC FOR DIABETES      >6.4%     ----DIABETIC CONTROL---     A1C%           eAG mg/dL  6.0            126  6.5            140  7.0            154  7.5            169  8.0            183  8.5            197  9.0            212  9.5            226  10.0           240     MICROALBUMIN, URINE [id number] 09:05AM TAYNA GERBER     Test Name Result Flag Reference   MICROALBUMIN 32.30 mg/dl     CREATININE RANDOM URINE 119.00 mg/dl     MICROALB/CREAT RATIO 271.4 mg/g H <30   Short-term hyperglycemia, exercise, urinary tract infections, marked hypertension, heart failure, and acute febrile illness can cause transient elevations in urinary albumin excretion.  Due to marked day-to-day variability in albumin excretion, at least two of three collections done in a 3 to 6 month period should show elevated levels before initially designating a patient as having microalbuminuria.     URIC ACID [id number] 09:05AM  TANYA GERBER     Test Name Result Flag Reference   URIC ACID 3.9 mg/dl  2.6-5.9     VITAMIN B12/FOLATE [id number] 09:05AM TANYA GERBER     Test Name Result Flag Reference   VITAMIN B12 501 pg/ml  211-911   FOLATE 10.4 ng/ml  >5.4     URINALYSIS SCREEN with MICROSCOPIC IF INDICATED AND URINE CULTURE REFLEX [id number] 09:05AM ZABRINA TANYA     Test Name Result Flag Reference   URINE COLOR YELLOW  YELLOW   APPEARANCE, URINE CLEAR     URINE GLUCOSE TRACE mg/dl A NEGATIVE   URINE BILIRUBIN NEGATIVE  NEGATIVE   KETONES NEGATIVE mg/dl  NEGATIVE   URINE SPECIFIC GRAVITY 1.016  1.005-1.030   RBC-URINE NEGATIVE  NEGATIVE   PH-URINE 5.0 Units  5.0-7.0   URINE PROTEIN 100 mg/dl A NEGATIVE   UROBILINOGEN-URINE 0.2 mg/dl  0.0-1.0   NITRITE NEGATIVE  NEGATIVE   WBC-URINE NEGATIVE  NEGATIVE   SPECIMEN TYPE      URINE, CLEAN CATCH/MIDSTREAM   SQUAMOUS EPITHELIAL CELLS 6 to 10 /HPF  0-5   ERYTHROCYTES 1 to 3 /HPF  0-3   LEUKOCYTES 1 to 5 /HPF  0-5   BACTERIA NONE SEEN /HPF  NONE SEEN   HYALINE CASTS NONE SEEN /LPF  0-5   MUCUS PRESENT

## 2020-01-16 ENCOUNTER — OFFICE VISIT (OUTPATIENT)
Dept: FAMILY MEDICINE CLINIC | Age: 55
End: 2020-01-16

## 2020-01-16 VITALS
DIASTOLIC BLOOD PRESSURE: 82 MMHG | HEART RATE: 78 BPM | BODY MASS INDEX: 44.41 KG/M2 | WEIGHT: 293 LBS | RESPIRATION RATE: 16 BRPM | TEMPERATURE: 98.2 F | HEIGHT: 68 IN | SYSTOLIC BLOOD PRESSURE: 142 MMHG | OXYGEN SATURATION: 98 %

## 2020-01-16 DIAGNOSIS — I10 ESSENTIAL HYPERTENSION: ICD-10-CM

## 2020-01-16 DIAGNOSIS — F41.9 ANXIETY: ICD-10-CM

## 2020-01-16 DIAGNOSIS — Z23 ENCOUNTER FOR IMMUNIZATION: ICD-10-CM

## 2020-01-16 DIAGNOSIS — E66.01 OBESITY, MORBID (HCC): ICD-10-CM

## 2020-01-16 DIAGNOSIS — E11.9 DIABETES MELLITUS WITHOUT COMPLICATION (HCC): Primary | ICD-10-CM

## 2020-01-16 DIAGNOSIS — J45.20 MILD INTERMITTENT ASTHMA WITHOUT COMPLICATION: ICD-10-CM

## 2020-01-16 DIAGNOSIS — Z98.84 S/P GASTRIC BYPASS: ICD-10-CM

## 2020-01-16 DIAGNOSIS — F33.9 RECURRENT DEPRESSION (HCC): ICD-10-CM

## 2020-01-16 DIAGNOSIS — E55.9 VITAMIN D DEFICIENCY: ICD-10-CM

## 2020-01-16 DIAGNOSIS — K21.9 GASTROESOPHAGEAL REFLUX DISEASE WITHOUT ESOPHAGITIS: ICD-10-CM

## 2020-01-16 DIAGNOSIS — E78.00 PURE HYPERCHOLESTEROLEMIA: ICD-10-CM

## 2020-01-16 DIAGNOSIS — J30.2 SEASONAL ALLERGIC RHINITIS, UNSPECIFIED TRIGGER: ICD-10-CM

## 2020-01-16 LAB — HBA1C MFR BLD HPLC: 7.6 %

## 2020-01-16 RX ORDER — AMLODIPINE BESYLATE 2.5 MG/1
2.5 TABLET ORAL DAILY
Qty: 90 TAB | Refills: 0 | Status: SHIPPED | OUTPATIENT
Start: 2020-01-16 | End: 2020-04-14 | Stop reason: SDUPTHER

## 2020-01-16 NOTE — PATIENT INSTRUCTIONS
Vaccine Information Statement Influenza (Flu) Vaccine (Inactivated or Recombinant): What You Need to Know Many Vaccine Information Statements are available in Occitan and other languages. See www.immunize.org/vis Hojas de información sobre vacunas están disponibles en español y en muchos otros idiomas. Visite www.immunize.org/vis 1. Why get vaccinated? Influenza vaccine can prevent influenza (flu). Flu is a contagious disease that spreads around the [facility] every year, usually between October and May. Anyone can get the flu, but it is more dangerous for some people. Infants and young children, people 72years of age and older, pregnant women, and people with certain health conditions or a weakened immune system are at greatest risk of flu complications. Pneumonia, bronchitis, sinus infections and ear infections are examples of flu-related complications. If you have a medical condition, such as heart disease, cancer or diabetes, flu can make it worse. Flu can cause fever and chills, sore throat, muscle aches, fatigue, cough, headache, and runny or stuffy nose. Some people may have vomiting and diarrhea, though this is more common in children than adults. Each year thousands of people in the [facility] die from flu, and many more are hospitalized. Flu vaccine prevents millions of illnesses and flu-related visits to the doctor each year. 2. Influenza vaccines CDC recommends everyone 10months of age and older get vaccinated every flu season. Children 6 months through 6years of age may need 2 doses during a single flu season. Everyone else needs only 1 dose each flu season. It takes about 2 weeks for protection to develop after vaccination. There are many flu viruses, and they are always changing. Each year a new flu vaccine is made to protect against three or four viruses that are likely to cause disease in the upcoming flu season.  Even when the vaccine doesnt exactly match these viruses, it may still provide some protection. Influenza vaccine does not cause flu. Influenza vaccine may be given at the same time as other vaccines. 3. Talk with your health care provider Tell your vaccine provider if the person getting the vaccine: 
 Has had an allergic reaction after a previous dose of influenza vaccine, or has any severe, life-threatening allergies.  Has ever had Guillain-Barré Syndrome (also called GBS). In some cases, your health care provider may decide to postpone influenza vaccination to a future visit. People with minor illnesses, such as a cold, may be vaccinated. People who are moderately or severely ill should usually wait until they recover before getting influenza vaccine. Your health care provider can give you more information. 4. Risks of a reaction  Soreness, redness, and swelling where shot is given, fever, muscle aches, and headache can happen after influenza vaccine.  There may be a very small increased risk of Guillain-Barré Syndrome (GBS) after inactivated influenza vaccine (the flu shot). Kaylie Luis children who get the flu shot along with pneumococcal vaccine (PCV13), and/or DTaP vaccine at the same time might be slightly more likely to have a seizure caused by fever. Tell your health care provider if a child who is getting flu vaccine has ever had a seizure. People sometimes faint after medical procedures, including vaccination. Tell your provider if you feel dizzy or have vision changes or ringing in the ears. As with any medicine, there is a very remote chance of a vaccine causing a severe allergic reaction, other serious injury, or death. 5. What if there is a serious problem? An allergic reaction could occur after the vaccinated person leaves the clinic.  If you see signs of a severe allergic reaction (hives, swelling of the face and throat, difficulty breathing, a fast heartbeat, dizziness, or weakness), call 9-1-1 and get the person to the nearest hospital. 
 
For other signs that concern you, call your health care provider. Adverse reactions should be reported to the Vaccine Adverse Event Reporting System (VAERS). Your health care provider will usually file this report, or you can do it yourself. Visit the VAERS website at www.vaers. hhs.gov or call 7-868.129.2778. VAERS is only for reporting reactions, and VAERS staff do not give medical advice. 6. The National Vaccine Injury Compensation Program 
 
The [facility] Vaccine Injury Compensation Program (VICP) is a federal program that was created to compensate people who may have been injured by certain vaccines. Visit the VICP website at www.[facility].gov/vaccinecompensation or call 7-354.979.9237 to learn about the program and about filing a claim. There is a time limit to file a claim for compensation. 7. How can I learn more?  Ask your health care provider.  Call your local or state health department.  Contact the Centers for Disease Control and Prevention (CDC): 
- Call 0-145.679.8479 (1-800-CDC-INFO) or 
- Visit CDCs influenza website at www.cdc.gov/flu Vaccine Information Statement (Interim) Inactivated Influenza Vaccine 8/15/2019 
[address] Caro [id number] Department of Health and [facility] Centers for Disease Control and Prevention Office Use Only

## 2020-01-16 NOTE — PROGRESS NOTES
Chitol Gilberto, 49 y.o.,  female    SUBJECTIVE  Ff-up    DM- has not been seen here since June. She reports to take metformin regularly. Reviewed labs. a1c slightly over 7 since 2017, has been hesitant to taking more medication due to potential side effect profile. She says following with DM program through the public school system, now checking glucose levels 's. HL-she brings in employee health screening reports  tchol 170. She  Reports myalgia on crestor, lipitor, simvastatin, red yeast rice and most recently zetia. Reports father CAD/CABG    Depression/anxiety-she reports about the same, she takes zoloft 100-150 mg depending on stressors. HTN- taking norvasc 5 mg, and losartan 100 mg. She says BP is higher ~140-150/80's, when her valsartan was switched to losartan due to medication recall. GERD- doing well on prilosec. asthma is doing well, had one episode of exacerbation during thanksgiving. She is on advair. On singulair and zyrtec as well for allergies. Vit d def- she is s/p gastric bypass, says taking 50,000 iu vit d 2 x a week. Per pt utd with gyne care with dr. Jaciel Miramontes:  See HPI, all others negative        Patient Active Problem List   Diagnosis Code    HTN (hypertension) I10    Allergic rhinitis J30.9    Depression F32.9    S/P gastric bypass Z98.84    Anxiety F41.9    Iron deficiency E61.1    Vitamin D deficiency E55.9    Irregular menses N92.6    Vertigo R42    Onychomycosis B35.1    Impaired glucose tolerance R73.02    Rosacea L71.9    Gastroesophageal reflux disease without esophagitis K21.9    Asthma J45.909    Obesity, morbid ([facility] [address] E66.01    Recurrent depression ([facility] [address] F33.9    Pure hypercholesterolemia E78.00       Current Outpatient Medications   Medication Sig Dispense Refill    amLODIPine (NORVASC) 2.5 mg tablet Take 1 Tab by mouth daily. 90 Tab 0    empagliflozin (JARDIANCE) 25 mg tablet Take 1 Tab by mouth daily.  90 Tab 0    metFORMIN ER (GLUCOPHAGE XR) 500 mg tablet Take 4 Tabs by mouth daily (with dinner). 360 Tab 1    losartan (COZAAR) 100 mg tablet Take 1 Tab by mouth daily. 60 Tab 0    sertraline (ZOLOFT) 100 mg tablet Take 1.5 Tabs by mouth daily. 135 Tab 0    omeprazole (PRILOSEC) 20 mg capsule Take 1 Cap by mouth daily. 90 Cap 3    amLODIPine (NORVASC) 5 mg tablet Take 1 Tab by mouth daily. 90 Tab 2    ezetimibe (ZETIA) 10 mg tablet Take 1 Tab by mouth daily. 90 Tab 0    ergocalciferol (ERGOCALCIFEROL) 50,000 unit capsule Take 1 Cap by mouth two (2) days a week. 24 Cap 3    montelukast (SINGULAIR) 10 mg tablet Take 1 Tab by mouth daily. 90 Tab 3    fluticasone-Salmeterol (ADVAIR HFA) 45-21 mcg/actuation inhaler Take 2 Puffs by inhalation two (2) times a day. 3 Inhaler 1    fluticasone propionate (FLONASE) 50 mcg/actuation nasal spray 2 Sprays by Both Nostrils route daily. 3 Bottle 3    albuterol (PROVENTIL HFA, VENTOLIN HFA, PROAIR HFA) 90 mcg/actuation inhaler Take 1-2 Puffs by inhalation every four (4) hours as needed for Wheezing or Shortness of Breath. 3 Inhaler 3    ALPRAZolam (XANAX) 0.5 mg tablet Take 1 Tab by mouth three (3) times daily as needed for Anxiety. Max Daily Amount: 1.5 mg. 12 Tab 0    B.infantis-B.ani-B.long-B.bifi 10-15 mg TbEC Take  by mouth.  CETIRIZINE HCL (ZYRTEC PO) Take  by mouth daily.  ibuprofen (MOTRIN) 200 mg tablet Take 600 mg by mouth every six (6) hours as needed.  CYANOCOBALAMIN (VITAMIN B-12 SL) by SubLINGual route.          Allergies   Allergen Reactions    Entex [Phenylephrine-Guaifenesin] Unknown (comments)    Simvastatin Myalgia       Past Medical History:   Diagnosis Date    Asthma     Depression     Anxiety    GERD (gastroesophageal reflux disease)     Hypercholesterolemia     Hypertension        Social History     Socioeconomic History    Marital status:      Spouse name: Not on file    Number of children: Not on file    Years of education: Not on file    Highest education level: Not on file   Occupational History    Not on file   Social Needs    Financial resource strain: Not on file    Food insecurity:     Worry: Not on file     Inability: Not on file    Transportation needs:     Medical: Not on file     Non-medical: Not on file   Tobacco Use    Smoking status: Never Smoker    Smokeless tobacco: Never Used   Substance and Sexual Activity    Alcohol use: Yes     Comment: 2 drinks per month    Drug use: No    Sexual activity: Not Currently     Partners: Male   Lifestyle    Physical activity:     Days per week: Not on file     Minutes per session: Not on file    Stress: Not on file   Relationships    Social connections:     Talks on phone: Not on file     Gets together: Not on file     Attends Orthodox service: Not on file     Active member of club or organization: Not on file     Attends meetings of clubs or organizations: Not on file     Relationship status: Not on file    Intimate partner violence:     Fear of current or ex partner: Not on file     Emotionally abused: Not on file     Physically abused: Not on file     Forced sexual activity: Not on file   Other Topics Concern    Not on file   Social History Narrative    Not on file       Family History   Problem Relation Age of Onset    Asthma Mother     High Cholesterol Mother     Hypertension Mother     Thyroid Disease Mother     Cancer Mother 79        Thyroid Cancer    Depression Father     High Cholesterol Father     Hypertension Father     Colon Polyps Father     Depression Brother     High Cholesterol Brother     Hypertension Brother     Breast Cancer Maternal Grandmother     High Cholesterol Maternal Grandmother     Hypertension Maternal Grandmother     Diabetes Maternal Grandmother     Cancer Maternal Grandfather         prostate    High Cholesterol Maternal Grandfather     Hypertension Maternal Grandfather     Diabetes Maternal Grandfather     High Cholesterol Paternal Grandmother     Hypertension Paternal Grandmother     Diabetes Paternal Grandmother     Thyroid Disease Paternal Grandmother     High Cholesterol Paternal Grandfather     Hypertension Paternal Grandfather     Diabetes Paternal Grandfather          OBJECTIVE    Physical Exam:     Visit Vitals  Visit Vitals  /82 (BP 1 Location: Left arm, BP Patient Position: Sitting)   Pulse 78   Temp 98.2 °F (36.8 °C) (Oral)   Resp 16   Ht 5' 8\" (1.727 m)   Wt 295 lb (133.8 kg)   LMP 11/05/2016   SpO2 98%   BMI 44.85 kg/m²       General: alert, obese,  in no apparent distress or pain  CVS: normal rate, regular rhythm, distinct S1 and S2  Lungs:clear to ausculation bilaterally, no crackles, wheezing or rhonchi noted  Skin: warm, no lesions, rashes noted  Psych:  mood and affect normal    Results for orders placed or performed in visit on 01/16/20   AMB POC HEMOGLOBIN A1C   Result Value Ref Range    Hemoglobin A1c (POC) 7.6 %           ASSESSMENT/PLAN  Shirley was seen today for other, hypertension, anxiety and depression.     Diagnoses and all orders for this visit:    Diabetes mellitus without complication, without long term current use of insulin  suboptimal 7.2>7.4>7.6  Discussed addition of SGLT2, rx jardiance given   cont metformin 2000 mg daily  ADA diet discussed  Due for eye exam, reminded- Dr. lGoria Ventura cmp, a1c, lipid panel in 3 months    Essential hypertension-  Needs better control  Increase norvasc to 7.5 mg from 5 mg (added 2.5 mg tab)  Cont losartan 100 mg  monitoring    Hyperlipidemia  Not at goal LDL< 100  Recheck lipids prior to next visit  intolorant to crestor, lipitor, zocor, crestor,  red yeast rice and zetia    S/P gastric bypass    Vitamin D Deficiency   s/p gastric bypass  Currently on  vitamin d to 50k twice a week    Recurrent Depression  Fair control  Cont zoloft to 100-150 mg zoloft mg, depending on her stress level  Monitoring    Anxiety    Allergic rhinitis, unspecified allergic rhinitis type  Cont singulair/zyrtec    Gastroesophageal reflux disease without esophagitis-  Stable, Cont PPI    Asthma, mild intermittent, uncomplicated  Controlled, cont advair, prn albuterol  Declines PCV vaccine, annual flu vaccine UTD    Morbid Obesity HCC  Encouraged wt loss/diet    Encounter for immunization  Flu vaccine given    Ff-up in 3 months or sooner prn    Follow-up and Dispositions    · Return in about 3 months (around 4/16/2020), or if symptoms worsen or fail to improve, for fasting labs a week prior to your next visit, routine chronic illness care. Patient understands plan of care. Patient has provided input and agrees with goals.

## 2020-01-16 NOTE — PROGRESS NOTES
1. Have you been to the ER, urgent care clinic since your last visit? Hospitalized since your last visit? No    2. Have you seen or consulted any other health care providers outside of the [address] [place] [address] since your last visit? Include any pap smears or colon screening. No   fLULAVAL 0.5 ml given IM in left deltoid. Lot # [id number], exp date 06/30/2020. Patient tolerated injection well. No adverse reaction noted.

## 2020-01-27 DIAGNOSIS — F41.9 ANXIETY: ICD-10-CM

## 2020-01-29 RX ORDER — SERTRALINE HYDROCHLORIDE 100 MG/1
150 TABLET, FILM COATED ORAL DAILY
Qty: 135 TAB | Refills: 0 | Status: SHIPPED | OUTPATIENT
Start: 2020-01-29 | End: 2020-07-02 | Stop reason: SDUPTHER

## 2020-01-29 RX ORDER — OMEPRAZOLE 20 MG/1
20 CAPSULE, DELAYED RELEASE ORAL DAILY
Qty: 90 CAP | Refills: 3 | Status: SHIPPED | OUTPATIENT
Start: 2020-01-29 | End: 2020-10-18 | Stop reason: SDUPTHER

## 2020-01-29 RX ORDER — LOSARTAN POTASSIUM 100 MG/1
100 TABLET ORAL DAILY
Qty: 90 TAB | Refills: 0 | Status: SHIPPED | OUTPATIENT
Start: 2020-01-29 | End: 2020-04-14 | Stop reason: SDUPTHER

## 2020-01-30 RX ORDER — ALPRAZOLAM 0.5 MG/1
0.5 TABLET ORAL
Qty: 12 TAB | Refills: 0 | Status: SHIPPED | OUTPATIENT
Start: 2020-01-30 | End: 2020-08-30 | Stop reason: SDUPTHER

## 2020-04-14 DIAGNOSIS — I10 ESSENTIAL HYPERTENSION: ICD-10-CM

## 2020-04-14 RX ORDER — AMLODIPINE BESYLATE 2.5 MG/1
2.5 TABLET ORAL DAILY
Qty: 90 TAB | Refills: 0 | Status: SHIPPED | OUTPATIENT
Start: 2020-04-14 | End: 2020-07-18 | Stop reason: SDUPTHER

## 2020-04-15 ENCOUNTER — TELEPHONE (OUTPATIENT)
Dept: FAMILY MEDICINE CLINIC | Age: 55
End: 2020-04-15

## 2020-04-15 NOTE — TELEPHONE ENCOUNTER
Approved today  Questionnaire submitted. PA Case [id number] Status: Approved. Authorization and Notifications Completed.

## 2020-04-15 NOTE — TELEPHONE ENCOUNTER
Fax received from [address] [place] [address] meds stating prior auth is required for the Ezetimibe 10MG Tablets  . Submit a PA request  1. Go to key. [employer] and click \"Enter a Key\"  2. Patient last name:  Alis Chandler      :  1965      Key: [id number]  3.  Click \"start a PA\", complete the form, and \"send to plan\"

## 2020-04-28 RX ORDER — EZETIMIBE 10 MG/1
10 TABLET ORAL DAILY
Qty: 90 TAB | Refills: 0 | Status: SHIPPED | OUTPATIENT
Start: 2020-04-28 | End: 2020-08-30 | Stop reason: SDUPTHER

## 2020-04-28 NOTE — TELEPHONE ENCOUNTER
Last OV: 01-  Last labs:06-  Next OV and labs: was due on or around 04- along with labs. No appointment on file.

## 2020-05-19 ENCOUNTER — PATIENT OUTREACH (OUTPATIENT)
Dept: FAMILY MEDICINE CLINIC | Age: 55
End: 2020-05-19

## 2020-05-26 ENCOUNTER — PATIENT OUTREACH (OUTPATIENT)
Dept: FAMILY MEDICINE CLINIC | Age: 55
End: 2020-05-26

## 2020-05-26 DIAGNOSIS — Z12.11 COLON CANCER SCREENING: Primary | ICD-10-CM

## 2020-05-26 NOTE — PROGRESS NOTES
[place] health screening:    Ms Koby Martinez is aware of [facility] daniel expiring and I will not f/u with screenings but to call office with questions.

## 2020-06-08 ENCOUNTER — VIRTUAL VISIT (OUTPATIENT)
Dept: FAMILY MEDICINE CLINIC | Age: 55
End: 2020-06-08

## 2020-06-08 DIAGNOSIS — Z98.84 S/P GASTRIC BYPASS: ICD-10-CM

## 2020-06-08 DIAGNOSIS — Z12.11 COLON CANCER SCREENING: ICD-10-CM

## 2020-06-08 DIAGNOSIS — J30.2 SEASONAL ALLERGIC RHINITIS, UNSPECIFIED TRIGGER: ICD-10-CM

## 2020-06-08 DIAGNOSIS — R00.2 PALPITATIONS: Primary | ICD-10-CM

## 2020-06-08 DIAGNOSIS — R53.83 FATIGUE, UNSPECIFIED TYPE: ICD-10-CM

## 2020-06-08 DIAGNOSIS — E78.00 PURE HYPERCHOLESTEROLEMIA: ICD-10-CM

## 2020-06-08 DIAGNOSIS — F41.9 ANXIETY: ICD-10-CM

## 2020-06-08 DIAGNOSIS — K21.9 GASTROESOPHAGEAL REFLUX DISEASE WITHOUT ESOPHAGITIS: ICD-10-CM

## 2020-06-08 DIAGNOSIS — R29.898 UPPER EXTREMITY WEAKNESS: ICD-10-CM

## 2020-06-08 DIAGNOSIS — E66.01 OBESITY, MORBID (HCC): ICD-10-CM

## 2020-06-08 DIAGNOSIS — E55.9 VITAMIN D DEFICIENCY: ICD-10-CM

## 2020-06-08 DIAGNOSIS — K90.9 INTESTINAL MALABSORPTION, UNSPECIFIED TYPE: ICD-10-CM

## 2020-06-08 DIAGNOSIS — E11.9 CONTROLLED TYPE 2 DIABETES MELLITUS WITHOUT COMPLICATION, WITHOUT LONG-TERM CURRENT USE OF INSULIN (HCC): ICD-10-CM

## 2020-06-08 DIAGNOSIS — F33.9 RECURRENT DEPRESSION (HCC): ICD-10-CM

## 2020-06-08 DIAGNOSIS — J45.20 MILD INTERMITTENT ASTHMA WITHOUT COMPLICATION: ICD-10-CM

## 2020-06-08 NOTE — PROGRESS NOTES
Kvng Lanier is a 49 y.o. female who was seen by synchronous (real-time) audio-video technology on 6/8/2020. Consent: Kvng Lanier, who was seen by synchronous (real-time) audio-video technology, and/or her healthcare decision maker, is aware that this patient-initiated, Telehealth encounter on 6/8/2020 is a billable service, with coverage as determined by her insurance carrier. She is aware that she may receive a bill and has provided verbal consent to proceed: Yes. 712  Subjective:   Kvng Lanier is a 49 y.o. female who was seen for No chief complaint on file. Ff-up     DM- reports 's. She reports to take metformin, added jardiance 25 mg on last visit. Wonders if she has electrolyte problems with this, as for the past month has been having episodic palpitations. Denies cp, sob, syncope, lightheadedness. She reports some increase in anxiety/depression with her job security and family loss.      HL-she is currently on zetia  5  Mg and tolerating this dose. However reports weakness, feeling tired, most of her weakness in b/l arms. Gets tired when she lifts. She  Reports myalgia on crestor, lipitor, simvastatin, red yeast rice. Reports father CAD/CABG     Depression/anxiety-reports to be worse,  she takes zoloft 100-150 mg depending on stressors. Currently job security/MIL passing.      HTN-reports /80, increased norvasc dose to 7.5 mg on last visit. She continues to takelosartan 100 mg.      GERD- doing well on prilosec.       asthma is doing well, no recent exacerbations. She is on advair. On singulair and zyrtec as well for allergies.      Vit d def- she is s/p gastric bypass, says taking 50,000 iu vit d 2 x a week.      Per pt utd with gyne care with dr. Matthew Rodríguez    Prior to Admission medications    Medication Sig Start Date End Date Taking? Authorizing Provider   montelukast (Singulair) 10 mg tablet Take 1 Tab by mouth daily.  6/2/20  Yes Marino Fuentes MD   metFORMIN ER (GLUCOPHAGE XR) 500 mg tablet Take 4 Tabs by mouth daily (with dinner). 6/2/20  Yes Judy Soto MD   empagliflozin (Jardiance) 25 mg tablet Take 1 Tab by mouth daily. 6/2/20  Yes Judy Soto MD   ezetimibe (ZETIA) 10 mg tablet Take 1 Tab by mouth daily. Patient taking differently: Take 5 mg by mouth daily. 4/28/20  Yes Judy Soto MD   albuterol (PROVENTIL HFA, VENTOLIN HFA, PROAIR HFA) 90 mcg/actuation inhaler Take 1-2 Puffs by inhalation every four (4) hours as needed for Wheezing or Shortness of Breath. 4/14/20  Yes Judy Soto MD   ergocalciferol (ERGOCALCIFEROL) 1,250 mcg (50,000 unit) capsule Take 1 Cap by mouth two (2) days a week. 4/16/20  Yes Judy Soto MD   fluticasone-Salmeterol (Advair HFA) 06-59 mcg/actuation inhaler Take 2 Puffs by inhalation two (2) times a day. 4/14/20  Yes Judy Soto MD   fluticasone propionate (Flonase) 50 mcg/actuation nasal spray 2 Sprays by Both Nostrils route daily. 4/14/20  Yes Judy Soto MD   amLODIPine (Norvasc) 5 mg tablet Take 1 Tab by mouth daily. 4/14/20  Yes Judy Soto MD   losartan (COZAAR) 100 mg tablet Take 1 Tab by mouth daily. 4/14/20  Yes Judy Soto MD   amLODIPine (NORVASC) 2.5 mg tablet Take 1 Tab by mouth daily. 4/14/20  Yes Judy Soto MD   ALPRAZolam Vince Jinny) 0.5 mg tablet Take 1 Tab by mouth three (3) times daily as needed for Anxiety. Max Daily Amount: 1.5 mg. 1/30/20  Yes Judy Soto MD   omeprazole (PRILOSEC) 20 mg capsule Take 1 Cap by mouth daily. 1/29/20  Yes Judy Soto MD   sertraline (ZOLOFT) 100 mg tablet Take 1.5 Tabs by mouth daily. 1/29/20  Yes MD Mckenzie Dee.ani-B.long-BJeffreybifi 10-15 mg TbEC Take  by mouth. Yes Provider, Historical   CETIRIZINE HCL (ZYRTEC PO) Take  by mouth daily. Yes Provider, Historical   ibuprofen (MOTRIN) 200 mg tablet Take 600 mg by mouth every six (6) hours as needed.    Yes Provider, Historical CYANOCOBALAMIN (VITAMIN B-12 SL) by SubLINGual route. Yes Provider, Historical     Allergies   Allergen Reactions    Entex [Phenylephrine-Guaifenesin] Unknown (comments)    Simvastatin Myalgia       Patient Active Problem List    Diagnosis Date Noted    Controlled type 2 diabetes mellitus without complication, without long-term current use of insulin ([facility] [address] 06/08/2020    Obesity, morbid ([facility] [address] 12/21/2017    Recurrent depression ([facility] [address] 12/21/2017    Pure hypercholesterolemia 12/21/2017    Asthma 08/21/2015    Gastroesophageal reflux disease without esophagitis 06/15/2015    Rosacea 07/08/2013    Impaired glucose tolerance 03/12/2012    Onychomycosis 10/03/2010    Iron deficiency 09/15/2010    Vitamin D deficiency 09/15/2010    Irregular menses 09/15/2010    Vertigo 09/15/2010    HTN (hypertension) 08/10/2010    Allergic rhinitis 08/10/2010    Depression 08/10/2010    S/P gastric bypass 08/10/2010    Anxiety 08/10/2010     Current Outpatient Medications   Medication Sig Dispense Refill    montelukast (Singulair) 10 mg tablet Take 1 Tab by mouth daily. 90 Tab 0    metFORMIN ER (GLUCOPHAGE XR) 500 mg tablet Take 4 Tabs by mouth daily (with dinner). 360 Tab 0    empagliflozin (Jardiance) 25 mg tablet Take 1 Tab by mouth daily. 90 Tab 0    ezetimibe (ZETIA) 10 mg tablet Take 1 Tab by mouth daily. (Patient taking differently: Take 5 mg by mouth daily.) 90 Tab 0    albuterol (PROVENTIL HFA, VENTOLIN HFA, PROAIR HFA) 90 mcg/actuation inhaler Take 1-2 Puffs by inhalation every four (4) hours as needed for Wheezing or Shortness of Breath. 3 Inhaler 0    ergocalciferol (ERGOCALCIFEROL) 1,250 mcg (50,000 unit) capsule Take 1 Cap by mouth two (2) days a week. 24 Cap 0    fluticasone-Salmeterol (Advair HFA) 45-21 mcg/actuation inhaler Take 2 Puffs by inhalation two (2) times a day. 3 Inhaler 1    fluticasone propionate (Flonase) 50 mcg/actuation nasal spray 2 Sprays by Both Nostrils route daily.  3 Bottle 0    amLODIPine (Norvasc) 5 mg tablet Take 1 Tab by mouth daily. 90 Tab 0    losartan (COZAAR) 100 mg tablet Take 1 Tab by mouth daily. 90 Tab 0    amLODIPine (NORVASC) 2.5 mg tablet Take 1 Tab by mouth daily. 90 Tab 0    ALPRAZolam (XANAX) 0.5 mg tablet Take 1 Tab by mouth three (3) times daily as needed for Anxiety. Max Daily Amount: 1.5 mg. 12 Tab 0    omeprazole (PRILOSEC) 20 mg capsule Take 1 Cap by mouth daily. 90 Cap 3    sertraline (ZOLOFT) 100 mg tablet Take 1.5 Tabs by mouth daily. 135 Tab 0    B.infantis-B.ani-B.long-B.bifi 10-15 mg TbEC Take  by mouth.  CETIRIZINE HCL (ZYRTEC PO) Take  by mouth daily.  ibuprofen (MOTRIN) 200 mg tablet Take 600 mg by mouth every six (6) hours as needed.  CYANOCOBALAMIN (VITAMIN B-12 SL) by SubLINGual route.        Allergies   Allergen Reactions    Entex [Phenylephrine-Guaifenesin] Unknown (comments)    Simvastatin Myalgia     Past Medical History:   Diagnosis Date    Asthma     Depression     Anxiety    GERD (gastroesophageal reflux disease)     Hypercholesterolemia     Hypertension      Past Surgical History:   Procedure Laterality Date    HX CHOLECYSTECTOMY  2009    HX DILATION AND CURETTAGE  8/2015    HX GASTRIC BYPASS  2002    HX PELVIC LAPAROSCOPY  1995    HX TONSIL AND ADENOIDECTOMY      at age 4     Family History   Problem Relation Age of Onset    Asthma Mother     High Cholesterol Mother     Hypertension Mother     Thyroid Disease Mother     Cancer Mother 79        Thyroid Cancer    Depression Father     High Cholesterol Father     Hypertension Father     Colon Polyps Father     Depression Brother     High Cholesterol Brother     Hypertension Brother     Breast Cancer Maternal Grandmother     High Cholesterol Maternal Grandmother     Hypertension Maternal Grandmother     Diabetes Maternal Grandmother     Cancer Maternal Grandfather         prostate    High Cholesterol Maternal Grandfather     Hypertension Maternal Grandfather     Diabetes Maternal Grandfather     High Cholesterol Paternal Grandmother     Hypertension Paternal Grandmother     Diabetes Paternal Grandmother     Thyroid Disease Paternal Grandmother     High Cholesterol Paternal Grandfather     Hypertension Paternal Grandfather     Diabetes Paternal Grandfather      Social History     Tobacco Use    Smoking status: Never Smoker    Smokeless tobacco: Never Used   Substance Use Topics    Alcohol use: Yes     Comment: 2 drinks per month       ROS      Objective:     Visit Vitals  LMP 11/05/2016      General: alert, cooperative, no distress   Mental  status: normal mood, behavior, speech, dress, motor activity, and thought processes, able to follow commands   HENT: NCAT   Neck: no visualized mass   Resp: no respiratory distress   Neuro: no gross deficits   Skin: no discoloration or lesions of concern on visible areas   Psychiatric: normal affect, consistent with stated mood, no evidence of hallucinations     Additional exam findings: We discussed the expected course, resolution and complications of the diagnosis(es) in detail. Medication risks, benefits, costs, interactions, and alternatives were discussed as indicated. I advised her to contact the office if her condition worsens, changes or fails to improve as anticipated. She expressed understanding with the diagnosis(es) and plan. Fatemeh Edward is a 49 y.o. female who was evaluated by a video visit encounter for concerns as above. Patient identification was verified prior to start of the visit. A caregiver was present when appropriate. Due to this being a TeleHealth encounter (During GAIMZ-23 public health emergency), evaluation of the following organ systems was limited: Vitals/Constitutional/EENT/Resp/CV/GI//MS/Neuro/Skin/Heme-Lymph-Imm.   Pursuant to the emergency declaration under the [facility] waiver authority and the Coronavirus Preparedness and Response Supplemental Appropriations Act, this Virtual  Visit was conducted, with patient's (and/or legal guardian's) consent, to reduce the patient's risk of exposure to COVID-19 and provide necessary medical care. Services were provided through a video synchronous discussion virtually to substitute for in-person clinic visit. Patient and provider were located at their individual homes. Assessment & Plan:   Diagnoses and all orders for this visit:    1. Palpitations  Mild, no alarming s/sx  Check:  -     CBC WITH AUTOMATED DIFF; Future  -     MAGNESIUM; Future  -     EKG, 12 LEAD, INITIAL; Future    2. S/P gastric bypass    3. Intestinal malabsorption, unspecified type  -     VITAMIN D, 25 HYDROXY; Future  -     VITAMIN B12 & FOLATE; Future  -     VITAMIN B1, WHOLE BLOOD; Future  -     FERRITIN; Future  -     IRON PROFILE; Future    4. Controlled type 2 diabetes mellitus without complication, without long-term current use of insulin (HCC)  Elevated 's await A1c  Cont metformin 2000 mg  Cont jardiance 25 mg  -     METABOLIC PANEL, COMPREHENSIVE; Future  -     LIPID PANEL; Future  -     HEMOGLOBIN A1C W/O EAG; Future  -     MICROALBUMIN, UR, RAND W/ MICROALB/CREAT RATIO; Future    5. Anxiety  Needs better control  Recent stressor  Cont zoloft  Consider talk therapy    6. Recurrent depression ([facility] [address]    7. Vitamin D deficiency    8. Seasonal allergic rhinitis, unspecified trigger  Controlled    9. Pure hypercholesterolemia  Intolerant to multiple statin  Currently zetia 5 mg qhs  LDL goal <100    10. Mild intermittent asthma without complication  Controlled  Cont laba/ICS  Prn albuterol    11. Gastroesophageal reflux disease without esophagitis  Stable   cont PPI    12. Obesity, morbid ([address]    13. Colon cancer screening  -     OCCULT BLOOD IMMUNOASSAY,DIAGNOSTIC; Future    14. Upper extremity weakness  -     SED RATE (ESR); Future  -     CK;  Future  -     KEO COMPREHENSIVE PANEL; Future    15.  Fatigue, unspecified type  Multifactorial consider stress/depression/check malabsorption labs    Ff-up in 2 weeks    Shan More MD

## 2020-06-08 NOTE — PATIENT INSTRUCTIONS
Fatigue: Care Instructions Your Care Instructions Fatigue is a feeling of tiredness, exhaustion, or lack of energy. You may feel fatigue because of too much or not enough activity. It can also come from stress, lack of sleep, boredom, and poor diet. Many medical problems, such as viral infections, can cause fatigue. Emotional problems, especially depression, are often the cause of fatigue. Fatigue is most often a symptom of another problem. Treatment for fatigue depends on the cause. For example, if you have fatigue because you have a certain health problem, treating this problem also treats your fatigue. If depression or anxiety is the cause, treatment may help. Follow-up care is a key part of your treatment and safety. Be sure to make and go to all appointments, and call your doctor if you are having problems. It's also a good idea to know your test results and keep a list of the medicines you take. How can you care for yourself at home? · Get regular exercise. But don't overdo it. Go back and forth between rest and exercise. · Get plenty of rest. 
· Eat a healthy diet. Do not skip meals, especially breakfast. 
· Reduce your use of caffeine, tobacco, and alcohol. Caffeine is most often found in coffee, tea, cola drinks, and chocolate. · Limit medicines that can cause fatigue. This includes tranquilizers and cold and allergy medicines. When should you call for help? Watch closely for changes in your health, and be sure to contact your doctor if: 
· You have new symptoms such as fever or a rash. · Your fatigue gets worse. · You have been feeling down, depressed, or hopeless. Or you may have lost interest in things that you usually enjoy. · You are not getting better as expected. Where can you learn more? Go to http://lo-jennie.info/ Enter C051 in the search box to learn more about \"Fatigue: Care Instructions. \" 
 Current as of: June 26, 2019               Content Version: 12.5 © 8161-0708 [employer]. Care instructions adapted under license by [employer] (which disclaims liability or warranty for this information). If you have questions about a medical condition or this instruction, always ask your healthcare professional. [address] any warranty or liability for your use of this information.

## 2020-06-17 ENCOUNTER — HOSPITAL ENCOUNTER (OUTPATIENT)
Dept: LAB | Age: 55
Discharge: HOME | End: 2020-06-17

## 2020-06-17 ENCOUNTER — APPOINTMENT (OUTPATIENT)
Dept: LAB | Age: 55
End: 2020-06-17

## 2020-06-17 LAB — XX-LABCORP SPECIMEN COL,LCBCF: NORMAL

## 2020-06-17 PROCEDURE — 99001 SPECIMEN HANDLING PT-LAB: CPT

## 2020-06-24 LAB
25(OH)D3+25(OH)D2 SERPL-MCNC: 35.9 NG/ML (ref 30–100)
ALBUMIN SERPL-MCNC: 4.2 G/DL (ref 3.8–4.9)
ALBUMIN/CREAT UR: 17 MG/G CREAT (ref 0–29)
ALBUMIN/GLOB SERPL: 2.2 {RATIO} (ref 1.2–2.2)
ALP SERPL-CCNC: 126 IU/L (ref 39–117)
ALT SERPL-CCNC: 40 IU/L (ref 0–32)
AST SERPL-CCNC: 18 IU/L (ref 0–40)
BASOPHILS # BLD AUTO: 0.1 X10E3/UL (ref 0–0.2)
BASOPHILS NFR BLD AUTO: 1 %
BILIRUB SERPL-MCNC: 0.4 MG/DL (ref 0–1.2)
BUN SERPL-MCNC: 15 MG/DL (ref 6–24)
BUN/CREAT SERPL: 22 (ref 9–23)
CALCIUM SERPL-MCNC: 9.4 MG/DL (ref 8.7–10.2)
CENTROMERE B AB SER-ACNC: <0.2 AI (ref 0–0.9)
CHLORIDE SERPL-SCNC: 103 MMOL/L (ref 96–106)
CHOLEST SERPL-MCNC: 215 MG/DL (ref 100–199)
CHROMATIN AB SERPL-ACNC: <0.2 AI (ref 0–0.9)
CK SERPL-CCNC: 79 U/L (ref 32–182)
CO2 SERPL-SCNC: 22 MMOL/L (ref 20–29)
CREAT SERPL-MCNC: 0.68 MG/DL (ref 0.57–1)
CREAT UR-MCNC: 114 MG/DL
DSDNA AB SER-ACNC: 1 IU/ML (ref 0–9)
ENA JO1 AB SER-ACNC: <0.2 AI (ref 0–0.9)
ENA RNP AB SER-ACNC: 0.3 AI (ref 0–0.9)
ENA SCL70 AB SER-ACNC: <0.2 AI (ref 0–0.9)
ENA SM AB SER-ACNC: <0.2 AI (ref 0–0.9)
ENA SS-A AB SER-ACNC: <0.2 AI (ref 0–0.9)
ENA SS-B AB SER-ACNC: <0.2 AI (ref 0–0.9)
EOSINOPHIL # BLD AUTO: 0.1 X10E3/UL (ref 0–0.4)
EOSINOPHIL NFR BLD AUTO: 1 %
ERYTHROCYTE [DISTWIDTH] IN BLOOD BY AUTOMATED COUNT: 13.2 % (ref 11.7–15.4)
ERYTHROCYTE [SEDIMENTATION RATE] IN BLOOD BY WESTERGREN METHOD: 38 MM/HR (ref 0–40)
FERRITIN SERPL-MCNC: 14 NG/ML (ref 15–150)
FOLATE SERPL-MCNC: 10.5 NG/ML
GLOBULIN SER CALC-MCNC: 1.9 G/DL (ref 1.5–4.5)
GLUCOSE SERPL-MCNC: 158 MG/DL (ref 65–99)
HBA1C MFR BLD: 8.1 % (ref 4.8–5.6)
HCT VFR BLD AUTO: 41.7 % (ref 34–46.6)
HDLC SERPL-MCNC: 44 MG/DL
HGB BLD-MCNC: 13.2 G/DL (ref 11.1–15.9)
IMM GRANULOCYTES # BLD AUTO: 0 X10E3/UL (ref 0–0.1)
IMM GRANULOCYTES NFR BLD AUTO: 0 %
INTERPRETATION, 910389: NORMAL
IRON SATN MFR SERPL: 12 % (ref 15–55)
IRON SERPL-MCNC: 53 UG/DL (ref 27–159)
LDLC SERPL CALC-MCNC: 127 MG/DL (ref 0–99)
LYMPHOCYTES # BLD AUTO: 1.2 X10E3/UL (ref 0.7–3.1)
LYMPHOCYTES NFR BLD AUTO: 13 %
Lab: NORMAL
MAGNESIUM SERPL-MCNC: 2.1 MG/DL (ref 1.6–2.3)
MCH RBC QN AUTO: 26.3 PG (ref 26.6–33)
MCHC RBC AUTO-ENTMCNC: 31.7 G/DL (ref 31.5–35.7)
MCV RBC AUTO: 83 FL (ref 79–97)
MICROALBUMIN UR-MCNC: 19.5 UG/ML
MONOCYTES # BLD AUTO: 0.4 X10E3/UL (ref 0.1–0.9)
MONOCYTES NFR BLD AUTO: 5 %
NEUTROPHILS # BLD AUTO: 6.8 X10E3/UL (ref 1.4–7)
NEUTROPHILS NFR BLD AUTO: 80 %
PLATELET # BLD AUTO: 328 X10E3/UL (ref 150–450)
POTASSIUM SERPL-SCNC: 4.3 MMOL/L (ref 3.5–5.2)
PROT SERPL-MCNC: 6.1 G/DL (ref 6–8.5)
RBC # BLD AUTO: 5.02 X10E6/UL (ref 3.77–5.28)
SEE BELOW, 164869: NORMAL
SODIUM SERPL-SCNC: 141 MMOL/L (ref 134–144)
TIBC SERPL-MCNC: 443 UG/DL (ref 250–450)
TRIGL SERPL-MCNC: 221 MG/DL (ref 0–149)
UIBC SERPL-MCNC: 390 UG/DL (ref 131–425)
VIT B1 BLD-SCNC: 163.1 NMOL/L (ref 66.5–200)
VIT B12 SERPL-MCNC: 1667 PG/ML (ref 232–1245)
VLDLC SERPL CALC-MCNC: 44 MG/DL (ref 5–40)
WBC # BLD AUTO: 8.6 X10E3/UL (ref 3.4–10.8)

## 2020-06-26 NOTE — PROGRESS NOTES
Patient identified with 2 identifiers (name and ).  Patient aware of labs and has been scheduled for 2020

## 2020-06-29 ENCOUNTER — HOSPITAL ENCOUNTER (OUTPATIENT)
Dept: LAB | Age: 55
Discharge: HOME | End: 2020-06-29

## 2020-06-29 DIAGNOSIS — Z12.11 COLON CANCER SCREENING: ICD-10-CM

## 2020-06-29 PROCEDURE — 99001 SPECIMEN HANDLING PT-LAB: CPT

## 2020-06-30 ENCOUNTER — VIRTUAL VISIT (OUTPATIENT)
Dept: FAMILY MEDICINE CLINIC | Age: 55
End: 2020-06-30

## 2020-06-30 DIAGNOSIS — F41.9 ANXIETY: ICD-10-CM

## 2020-06-30 DIAGNOSIS — Z98.84 S/P GASTRIC BYPASS: ICD-10-CM

## 2020-06-30 DIAGNOSIS — E61.1 IRON DEFICIENCY: ICD-10-CM

## 2020-06-30 DIAGNOSIS — E66.01 OBESITY, MORBID (HCC): ICD-10-CM

## 2020-06-30 DIAGNOSIS — K21.9 GASTROESOPHAGEAL REFLUX DISEASE WITHOUT ESOPHAGITIS: ICD-10-CM

## 2020-06-30 DIAGNOSIS — J45.20 MILD INTERMITTENT ASTHMA WITHOUT COMPLICATION: ICD-10-CM

## 2020-06-30 DIAGNOSIS — Z83.49 FAMILY HISTORY OF THYROID DISEASE: ICD-10-CM

## 2020-06-30 DIAGNOSIS — E78.00 PURE HYPERCHOLESTEROLEMIA: ICD-10-CM

## 2020-06-30 DIAGNOSIS — J30.2 SEASONAL ALLERGIC RHINITIS, UNSPECIFIED TRIGGER: ICD-10-CM

## 2020-06-30 DIAGNOSIS — F33.9 RECURRENT DEPRESSION (HCC): ICD-10-CM

## 2020-06-30 DIAGNOSIS — R00.2 PALPITATIONS: ICD-10-CM

## 2020-06-30 DIAGNOSIS — E11.65 UNCONTROLLED TYPE 2 DIABETES MELLITUS WITH HYPERGLYCEMIA (HCC): Primary | ICD-10-CM

## 2020-06-30 RX ORDER — DULAGLUTIDE 0.75 MG/.5ML
0.75 INJECTION, SOLUTION SUBCUTANEOUS
Qty: 4 SYRINGE | Refills: 0 | Status: SHIPPED | OUTPATIENT
Start: 2020-06-30 | End: 2020-08-30 | Stop reason: SDUPTHER

## 2020-06-30 NOTE — PROGRESS NOTES
Benjamin John is a 49 y.o. female who was seen by synchronous (real-time) audio-video technology on 6/30/2020. Consent: Benjamin John, who was seen by synchronous (real-time) audio-video technology, and/or her healthcare decision maker, is aware that this patient-initiated, Telehealth encounter on 6/30/2020 is a billable service, with coverage as determined by her insurance carrier. She is aware that she may receive a bill and has provided verbal consent to proceed: Yes. 712  Subjective:   Benjamin John is a 49 y.o. female who was seen for No chief complaint on file. Ff-up     DM-reviewed labs a1c 8.1. she  reports 's. She says taking metformin, and jardiance 25 mg. She does not think jardiance is effective. She says she is following diet, current weight 298 per her scale. She is interested in seeing endocrinologist in [facility], she wants to hold off on dietician/DM educator care at this time. episodic palpitations- Denies cp, sob, syncope, lightheadedness. She reports some increase in anxiety/depression with her job security and family loss. Reminded on checking EKG, will add TSH.      HL-she is currently on zetia  5  Mg and tolerating this dose. However reports weakness, feeling tired, most of her weakness in b/l arms. Gets tired when she lifts. She  Reports myalgia on crestor, lipitor, simvastatin, red yeast rice. Reviewed inflammatory markers negative. Reports father CAD/CABG     Depression/anxiety-reports to be worse,  she takes zoloft 100-150 mg depending on stressors. Currently job security/MIL passing.      HTN-reports /80, currently taking  norvasc  7.5 mg and losartan 100 mg.      GERD- doing well on prilosec.       asthma is doing well, no recent exacerbations. She is on advair.  On singulair and zyrtec as well for allergies.      Vit d def- she is s/p gastric bypass, says taking 50,000 iu vit d 2 x a week.      Per pt utd with gyne care with dr. Shari Alvarez    Prior to Admission medications    Medication Sig Start Date End Date Taking? Authorizing Provider   dulaglutide (Trulicity) 9.36 NK/6.5 mL sub-q pen 0.5 mL by SubCUTAneous route every seven (7) days. 6/30/20  Siobhan Garza MD   montelukast (Singulair) 10 mg tablet Take 1 Tab by mouth daily. 6/2/20   Albino Doherty MD   metFORMIN ER (GLUCOPHAGE XR) 500 mg tablet Take 4 Tabs by mouth daily (with dinner). 6/2/20   Albino Doherty MD   empagliflozin (Jardiance) 25 mg tablet Take 1 Tab by mouth daily. 6/2/20   Albino Doherty MD   ezetimibe (ZETIA) 10 mg tablet Take 1 Tab by mouth daily. Patient taking differently: Take 5 mg by mouth daily. 4/28/20   Albino Doherty MD   albuterol (PROVENTIL HFA, VENTOLIN HFA, PROAIR HFA) 90 mcg/actuation inhaler Take 1-2 Puffs by inhalation every four (4) hours as needed for Wheezing or Shortness of Breath. 4/14/20   Albino Doherty MD   ergocalciferol (ERGOCALCIFEROL) 1,250 mcg (50,000 unit) capsule Take 1 Cap by mouth two (2) days a week. 4/16/20   Albino Doherty MD   fluticasone-Salmeterol (Advair HFA) 28-44 mcg/actuation inhaler Take 2 Puffs by inhalation two (2) times a day. 4/14/20   Albino Doherty MD   fluticasone propionate (Flonase) 50 mcg/actuation nasal spray 2 Sprays by Both Nostrils route daily. 4/14/20   Albino Doherty MD   amLODIPine (Norvasc) 5 mg tablet Take 1 Tab by mouth daily. 4/14/20   Albino Doherty MD   losartan (COZAAR) 100 mg tablet Take 1 Tab by mouth daily. 4/14/20   Albino Doherty MD   amLODIPine (NORVASC) 2.5 mg tablet Take 1 Tab by mouth daily. 4/14/20   Albino Doherty MD   ALPRAZolam [place]) 0.5 mg tablet Take 1 Tab by mouth three (3) times daily as needed for Anxiety. Max Daily Amount: 1.5 mg. 1/30/20   Albino Doherty MD   omeprazole (PRILOSEC) 20 mg capsule Take 1 Cap by mouth daily. 1/29/20   Albino Doherty MD   sertraline (ZOLOFT) 100 mg tablet Take 1.5 Tabs by mouth daily.  1/29/20   Albino Doherty, MD   B.infantis-B.ani-B.long-B.bifi 10-15 mg TbEC Take  by mouth. Provider, Historical   CETIRIZINE HCL (ZYRTEC PO) Take  by mouth daily. Provider, Historical   ibuprofen (MOTRIN) 200 mg tablet Take 600 mg by mouth every six (6) hours as needed. Provider, Historical   CYANOCOBALAMIN (VITAMIN B-12 SL) by SubLINGual route. Provider, Historical     Allergies   Allergen Reactions    Entex [Phenylephrine-Guaifenesin] Unknown (comments)    Simvastatin Myalgia       Patient Active Problem List    Diagnosis Date Noted    Controlled type 2 diabetes mellitus without complication, without long-term current use of insulin ([facility] 06/08/2020    Obesity, morbid ([facility] [facility] 12/21/2017    [facility] 12/21/2017    Pure hypercholesterolemia 12/21/2017    Asthma 08/21/2015    Gastroesophageal reflux disease without esophagitis 06/15/2015    Rosacea 07/08/2013    Impaired glucose tolerance 03/12/2012    Onychomycosis 10/03/2010    Iron deficiency 09/15/2010    Vitamin D deficiency 09/15/2010    Irregular menses 09/15/2010    Vertigo 09/15/2010    HTN (hypertension) 08/10/2010    Allergic rhinitis 08/10/2010    Depression 08/10/2010    S/P gastric bypass 08/10/2010    Anxiety 08/10/2010     Current Outpatient Medications   Medication Sig Dispense Refill    dulaglutide (Trulicity) 3.11 UN/3.0 mL sub-q pen 0.5 mL by SubCUTAneous route every seven (7) days. 4 Syringe 0    montelukast (Singulair) 10 mg tablet Take 1 Tab by mouth daily. 90 Tab 0    metFORMIN ER (GLUCOPHAGE XR) 500 mg tablet Take 4 Tabs by mouth daily (with dinner). 360 Tab 0    empagliflozin (Jardiance) 25 mg tablet Take 1 Tab by mouth daily. 90 Tab 0    ezetimibe (ZETIA) 10 mg tablet Take 1 Tab by mouth daily.  (Patient taking differently: Take 5 mg by mouth daily.) 90 Tab 0    albuterol (PROVENTIL HFA, VENTOLIN HFA, PROAIR HFA) 90 mcg/actuation inhaler Take 1-2 Puffs by inhalation every four (4) hours as needed for Wheezing or Shortness of Breath. 3 Inhaler 0    ergocalciferol (ERGOCALCIFEROL) 1,250 mcg (50,000 unit) capsule Take 1 Cap by mouth two (2) days a week. 24 Cap 0    fluticasone-Salmeterol (Advair HFA) 45-21 mcg/actuation inhaler Take 2 Puffs by inhalation two (2) times a day. 3 Inhaler 1    fluticasone propionate (Flonase) 50 mcg/actuation nasal spray 2 Sprays by Both Nostrils route daily. 3 Bottle 0    amLODIPine (Norvasc) 5 mg tablet Take 1 Tab by mouth daily. 90 Tab 0    losartan (COZAAR) 100 mg tablet Take 1 Tab by mouth daily. 90 Tab 0    amLODIPine (NORVASC) 2.5 mg tablet Take 1 Tab by mouth daily. 90 Tab 0    ALPRAZolam (XANAX) 0.5 mg tablet Take 1 Tab by mouth three (3) times daily as needed for Anxiety. Max Daily Amount: 1.5 mg. 12 Tab 0    omeprazole (PRILOSEC) 20 mg capsule Take 1 Cap by mouth daily. 90 Cap 3    sertraline (ZOLOFT) 100 mg tablet Take 1.5 Tabs by mouth daily. 135 Tab 0    B.infantis-B.ani-B.long-B.bifi 10-15 mg TbEC Take  by mouth.  CETIRIZINE HCL (ZYRTEC PO) Take  by mouth daily.  ibuprofen (MOTRIN) 200 mg tablet Take 600 mg by mouth every six (6) hours as needed.  CYANOCOBALAMIN (VITAMIN B-12 SL) by SubLINGual route.        Allergies   Allergen Reactions    Entex [Phenylephrine-Guaifenesin] Unknown (comments)    Simvastatin Myalgia     Past Medical History:   Diagnosis Date    Asthma     Depression     Anxiety    GERD (gastroesophageal reflux disease)     Hypercholesterolemia     Hypertension      Past Surgical History:   Procedure Laterality Date    HX CHOLECYSTECTOMY  2009    HX DILATION AND CURETTAGE  8/2015    HX GASTRIC BYPASS  2002    HX PELVIC LAPAROSCOPY  1995    HX TONSIL AND ADENOIDECTOMY      at age 4     Family History   Problem Relation Age of Onset    Asthma Mother     High Cholesterol Mother     Hypertension Mother     Thyroid Disease Mother     Cancer Mother 79        Thyroid Cancer    Depression Father     High Cholesterol Father     Hypertension Father     Colon Polyps Father     Depression Brother     High Cholesterol Brother     Hypertension Brother     Breast Cancer Maternal Grandmother     High Cholesterol Maternal Grandmother     Hypertension Maternal Grandmother     Diabetes Maternal Grandmother     Cancer Maternal Grandfather         prostate    High Cholesterol Maternal Grandfather     Hypertension Maternal Grandfather     Diabetes Maternal Grandfather     High Cholesterol Paternal Grandmother     Hypertension Paternal Grandmother     Diabetes Paternal Grandmother     Thyroid Disease Paternal Grandmother     High Cholesterol Paternal Grandfather     Hypertension Paternal Grandfather     Diabetes Paternal Grandfather      Social History     Tobacco Use    Smoking status: Never Smoker    Smokeless tobacco: Never Used   Substance Use Topics    Alcohol use: Yes     Comment: 2 drinks per month       ROS      Objective:     Visit Vitals  LMP 11/05/2016      General: alert, cooperative, no distress   Mental  status: normal mood, behavior, speech, dress, motor activity, and thought processes, able to follow commands   HENT: NCAT   Neck: no visualized mass   Resp: no respiratory distress   Neuro: no gross deficits   Skin: no discoloration or lesions of concern on visible areas   Psychiatric: normal affect, consistent with stated mood, no evidence of hallucinations     Additional exam findings:     Results for orders placed or performed during the hospital encounter of 06/17/20   [facility] SPECIMEN COL   Result Value Ref Range    [facility] SPECIMEN [facility]LL[facility]. Specimens collected/sent to [facility]. Please direct inquiries to ([phone]). We discussed the expected course, resolution and complications of the diagnosis(es) in detail. Medication risks, benefits, costs, interactions, and alternatives were discussed as indicated.   I advised her to contact the office if her condition worsens, changes or fails to improve as anticipated. She expressed understanding with the diagnosis(es) and plan. Kaylyn Lezama is a 49 y.o. female who was evaluated by a video visit encounter for concerns as above. Patient identification was verified prior to start of the visit. A caregiver was present when appropriate. Due to this being a TeleHealth encounter (During [employer] [facility] public health emergency), evaluation of the following organ systems was limited: Vitals/Constitutional/EENT/Resp/CV/GI//MS/Neuro/Skin/Heme-Lymph-Imm. Pursuant to the emergency declaration under the [facility], [facility] waiver authority and the [employer] and [employer] Act, this Virtual  Visit was conducted, with patient's (and/or legal guardian's) consent, to reduce the patient's risk of exposure to COVID-19 and provide necessary medical care. Services were provided through a video synchronous discussion virtually to substitute for in-person clinic visit. Patient and provider were located at their individual homes.     Assessment & Plan:   Diagnoses and all orders for this visit:    Uncontrolled type 2 diabetes with hyperglycemia ([address]  Needs better control  Add GLP1, erx trulicity  Cont metformin 2000 mg  Cont jardiance 25 mg- she will decided if she wants to cont this  ADA diet, weight loss, she will call us name of endocrinologist and will refer if needed  Address depression    Palpitations  Mild, no alarming s/sx  Normal electorlyes  Check:  EKG, has standing order  Will check TSH as well    S/P gastric bypass     Intestinal malabsorption, unspecified type  Mildly low ferritin, other labs look good  Cont po fe she takes OD    Anxiety  Needs better control  Recent stressor  Cont zoloft  Consider talk therapy    Recurrent depression ([address]     Vitamin D deficiency  wnl  Cont current dose    Seasonal allergic rhinitis, unspecified trigger  Controlled    Pure hypercholesterolemia  Intolerant to multiple statin  Currently zetia 5 mg qhs, no evidence of myositis, trial increase of zetia dose to 100 mg  LDL goal <100     Mild intermittent asthma without complication  Controlled  Cont laba/ICS  Prn albuterol    Gastroesophageal reflux disease without esophagitis  Stable   cont PPI     Obesity, morbid ([address]      Ff-up in 12 weeks    Luisa Law MD

## 2020-06-30 NOTE — PATIENT INSTRUCTIONS

## 2020-07-02 ENCOUNTER — PATIENT MESSAGE (OUTPATIENT)
Dept: FAMILY MEDICINE CLINIC | Age: 55
End: 2020-07-02

## 2020-07-02 RX ORDER — SERTRALINE HYDROCHLORIDE 100 MG/1
150 TABLET, FILM COATED ORAL DAILY
Qty: 135 TAB | Refills: 0 | Status: SHIPPED | OUTPATIENT
Start: 2020-07-02 | End: 2020-10-18 | Stop reason: SDUPTHER

## 2020-07-06 LAB — XX-LABCORP SPECIMEN COL,LCBCF: NORMAL

## 2020-07-07 LAB — HEMOCCULT STL QL IA: NEGATIVE

## 2020-07-18 DIAGNOSIS — I10 ESSENTIAL HYPERTENSION: ICD-10-CM

## 2020-07-20 RX ORDER — AMLODIPINE BESYLATE 2.5 MG/1
2.5 TABLET ORAL DAILY
Qty: 90 TAB | Refills: 0 | Status: SHIPPED | OUTPATIENT
Start: 2020-07-20 | End: 2020-11-16 | Stop reason: SDUPTHER

## 2020-07-20 RX ORDER — ERGOCALCIFEROL 1.25 MG/1
50000 CAPSULE ORAL
Qty: 24 CAP | Refills: 0 | Status: SHIPPED | OUTPATIENT
Start: 2020-07-23 | End: 2020-10-18 | Stop reason: SDUPTHER

## 2020-07-20 RX ORDER — AMLODIPINE BESYLATE 5 MG/1
5 TABLET ORAL DAILY
Qty: 90 TAB | Refills: 0 | Status: SHIPPED | OUTPATIENT
Start: 2020-07-20 | End: 2020-11-16 | Stop reason: SDUPTHER

## 2020-07-20 RX ORDER — LOSARTAN POTASSIUM 100 MG/1
100 TABLET ORAL DAILY
Qty: 90 TAB | Refills: 0 | Status: SHIPPED | OUTPATIENT
Start: 2020-07-20 | End: 2020-10-18 | Stop reason: SDUPTHER

## 2020-10-05 DIAGNOSIS — E11.9 CONTROLLED TYPE 2 DIABETES MELLITUS WITHOUT COMPLICATION, WITHOUT LONG-TERM CURRENT USE OF INSULIN (HCC): Primary | ICD-10-CM

## 2020-10-05 RX ORDER — DULAGLUTIDE 0.75 MG/.5ML
0.75 INJECTION, SOLUTION SUBCUTANEOUS
Qty: 4 SYRINGE | Refills: 0 | Status: SHIPPED | OUTPATIENT
Start: 2020-10-05 | End: 2020-10-18 | Stop reason: SDUPTHER

## 2020-10-19 RX ORDER — OMEPRAZOLE 20 MG/1
20 CAPSULE, DELAYED RELEASE ORAL DAILY
Qty: 90 CAP | Refills: 0 | Status: SHIPPED | OUTPATIENT
Start: 2020-10-19 | End: 2021-01-18 | Stop reason: SDUPTHER

## 2020-10-19 RX ORDER — SERTRALINE HYDROCHLORIDE 100 MG/1
150 TABLET, FILM COATED ORAL DAILY
Qty: 135 TAB | Refills: 0 | Status: SHIPPED | OUTPATIENT
Start: 2020-10-19 | End: 2021-03-14 | Stop reason: SDUPTHER

## 2020-10-19 RX ORDER — LOSARTAN POTASSIUM 100 MG/1
100 TABLET ORAL DAILY
Qty: 90 TAB | Refills: 0 | Status: SHIPPED | OUTPATIENT
Start: 2020-10-19 | End: 2021-01-18 | Stop reason: SDUPTHER

## 2020-10-19 RX ORDER — ERGOCALCIFEROL 1.25 MG/1
50000 CAPSULE ORAL
Qty: 24 CAP | Refills: 0 | Status: SHIPPED | OUTPATIENT
Start: 2020-10-22 | End: 2021-01-18 | Stop reason: SDUPTHER

## 2020-10-19 RX ORDER — DULAGLUTIDE 0.75 MG/.5ML
0.75 INJECTION, SOLUTION SUBCUTANEOUS
Qty: 4 SYRINGE | Refills: 0 | Status: SHIPPED | OUTPATIENT
Start: 2020-10-19 | End: 2020-11-25 | Stop reason: SDUPTHER

## 2020-10-23 ENCOUNTER — HOSPITAL ENCOUNTER (OUTPATIENT)
Dept: LAB | Age: 55
Discharge: HOME | End: 2020-10-23

## 2020-10-23 LAB — XX-LABCORP SPECIMEN COL,LCBCF: NORMAL

## 2020-10-23 PROCEDURE — 99001 SPECIMEN HANDLING PT-LAB: CPT

## 2020-10-24 LAB
ALBUMIN SERPL-MCNC: 3.9 G/DL (ref 3.8–4.9)
ALBUMIN/GLOB SERPL: 2.3 {RATIO} (ref 1.2–2.2)
ALP SERPL-CCNC: 111 IU/L (ref 39–117)
ALT SERPL-CCNC: 23 IU/L (ref 0–32)
AST SERPL-CCNC: 16 IU/L (ref 0–40)
BILIRUB SERPL-MCNC: 0.3 MG/DL (ref 0–1.2)
BUN SERPL-MCNC: 11 MG/DL (ref 6–24)
BUN/CREAT SERPL: 15 (ref 9–23)
CALCIUM SERPL-MCNC: 9.4 MG/DL (ref 8.7–10.2)
CHLORIDE SERPL-SCNC: 103 MMOL/L (ref 96–106)
CO2 SERPL-SCNC: 26 MMOL/L (ref 20–29)
CREAT SERPL-MCNC: 0.75 MG/DL (ref 0.57–1)
EST. AVERAGE GLUCOSE BLD GHB EST-MCNC: 169 MG/DL
GLOBULIN SER CALC-MCNC: 1.7 G/DL (ref 1.5–4.5)
GLUCOSE SERPL-MCNC: 109 MG/DL (ref 65–99)
HBA1C MFR BLD: 7.5 % (ref 4.8–5.6)
POTASSIUM SERPL-SCNC: 4.4 MMOL/L (ref 3.5–5.2)
PROT SERPL-MCNC: 5.6 G/DL (ref 6–8.5)
SODIUM SERPL-SCNC: 140 MMOL/L (ref 134–144)

## 2020-10-29 ENCOUNTER — OFFICE VISIT (OUTPATIENT)
Dept: FAMILY MEDICINE CLINIC | Age: 55
End: 2020-10-29
Payer: COMMERCIAL

## 2020-10-29 VITALS
WEIGHT: 293 LBS | BODY MASS INDEX: 44.41 KG/M2 | RESPIRATION RATE: 16 BRPM | DIASTOLIC BLOOD PRESSURE: 80 MMHG | SYSTOLIC BLOOD PRESSURE: 106 MMHG | OXYGEN SATURATION: 95 % | HEIGHT: 68 IN | TEMPERATURE: 98.9 F | HEART RATE: 81 BPM

## 2020-10-29 DIAGNOSIS — E78.00 PURE HYPERCHOLESTEROLEMIA: ICD-10-CM

## 2020-10-29 DIAGNOSIS — K21.9 GASTROESOPHAGEAL REFLUX DISEASE WITHOUT ESOPHAGITIS: ICD-10-CM

## 2020-10-29 DIAGNOSIS — F33.9 RECURRENT DEPRESSION (HCC): ICD-10-CM

## 2020-10-29 DIAGNOSIS — J45.20 MILD INTERMITTENT ASTHMA WITHOUT COMPLICATION: ICD-10-CM

## 2020-10-29 DIAGNOSIS — F41.9 ANXIETY: ICD-10-CM

## 2020-10-29 DIAGNOSIS — Z23 ENCOUNTER FOR IMMUNIZATION: ICD-10-CM

## 2020-10-29 DIAGNOSIS — Z98.84 S/P GASTRIC BYPASS: ICD-10-CM

## 2020-10-29 DIAGNOSIS — I10 ESSENTIAL HYPERTENSION: ICD-10-CM

## 2020-10-29 DIAGNOSIS — E11.9 TYPE 2 DIABETES MELLITUS WITHOUT COMPLICATION, WITHOUT LONG-TERM CURRENT USE OF INSULIN (HCC): Primary | ICD-10-CM

## 2020-10-29 DIAGNOSIS — J30.2 SEASONAL ALLERGIC RHINITIS, UNSPECIFIED TRIGGER: ICD-10-CM

## 2020-10-29 DIAGNOSIS — E11.9 TYPE 2 DIABETES MELLITUS WITHOUT COMPLICATION, WITHOUT LONG-TERM CURRENT USE OF INSULIN (HCC): ICD-10-CM

## 2020-10-29 DIAGNOSIS — E66.01 OBESITY, MORBID (HCC): ICD-10-CM

## 2020-10-29 DIAGNOSIS — E55.9 VITAMIN D DEFICIENCY: ICD-10-CM

## 2020-10-29 PROCEDURE — 3051F HG A1C>EQUAL 7.0%<8.0%: CPT | Performed by: FAMILY MEDICINE

## 2020-10-29 PROCEDURE — 99214 OFFICE O/P EST MOD 30 MIN: CPT | Performed by: FAMILY MEDICINE

## 2020-10-29 PROCEDURE — 90471 IMMUNIZATION ADMIN: CPT | Performed by: FAMILY MEDICINE

## 2020-10-29 PROCEDURE — 90686 IIV4 VACC NO PRSV 0.5 ML IM: CPT | Performed by: FAMILY MEDICINE

## 2020-10-29 NOTE — PROGRESS NOTES
1. Have you been to the ER, urgent care clinic since your last visit? Hospitalized since your last visit? No    2. Have you seen or consulted any other health care providers outside of the [address] [place] [address] since your last visit? Include any pap smears or colon screening.  [facility] Dr. Jaylyn House LOV: 8/2020 for diabetic eye exam.    Chief Complaint   Patient presents with    Diabetes    Cholesterol Problem    Asthma    GERD    Anxiety    Palpitations    Depression    Medication Evaluation     needs Rx for One Touch Ultra test strips and lancets

## 2020-10-29 NOTE — PATIENT INSTRUCTIONS
Vaccine Information Statement Influenza (Flu) Vaccine (Inactivated or Recombinant): What You Need to Know Many Vaccine Information Statements are available in Romanian and other languages. See www.immunize.org/vis Hojas de información sobre vacunas están disponibles en español y en muchos otros idiomas. Visite www.immunize.org/vis 1. Why get vaccinated? Influenza vaccine can prevent influenza (flu). Flu is a contagious disease that spreads around the [facility] every year, usually between October and May. Anyone can get the flu, but it is more dangerous for some people. Infants and young children, people 72years of age and older, pregnant women, and people with certain health conditions or a weakened immune system are at greatest risk of flu complications. Pneumonia, bronchitis, sinus infections and ear infections are examples of flu-related complications. If you have a medical condition, such as heart disease, cancer or diabetes, flu can make it worse. Flu can cause fever and chills, sore throat, muscle aches, fatigue, cough, headache, and runny or stuffy nose. Some people may have vomiting and diarrhea, though this is more common in children than adults. Each year thousands of people in the [facility] die from flu, and many more are hospitalized. Flu vaccine prevents millions of illnesses and flu-related visits to the doctor each year. 2. Influenza vaccines CDC recommends everyone 10months of age and older get vaccinated every flu season. Children 6 months through 6years of age may need 2 doses during a single flu season. Everyone else needs only 1 dose each flu season. It takes about 2 weeks for protection to develop after vaccination. There are many flu viruses, and they are always changing. Each year a new flu vaccine is made to protect against three or four viruses that are likely to cause disease in the upcoming flu season.  Even when the vaccine doesnt exactly match these viruses, it may still provide some protection. Influenza vaccine does not cause flu. Influenza vaccine may be given at the same time as other vaccines. 3. Talk with your health care provider Tell your vaccine provider if the person getting the vaccine: 
 Has had an allergic reaction after a previous dose of influenza vaccine, or has any severe, life-threatening allergies.  Has ever had Guillain-Barré Syndrome (also called GBS). In some cases, your health care provider may decide to postpone influenza vaccination to a future visit. People with minor illnesses, such as a cold, may be vaccinated. People who are moderately or severely ill should usually wait until they recover before getting influenza vaccine. Your health care provider can give you more information. 4. Risks of a reaction  Soreness, redness, and swelling where shot is given, fever, muscle aches, and headache can happen after influenza vaccine.  There may be a very small increased risk of Guillain-Barré Syndrome (GBS) after inactivated influenza vaccine (the flu shot). [place] children who get the flu shot along with pneumococcal vaccine (PCV13), and/or DTaP vaccine at the same time might be slightly more likely to have a seizure caused by fever. Tell your health care provider if a child who is getting flu vaccine has ever had a seizure. People sometimes faint after medical procedures, including vaccination. Tell your provider if you feel dizzy or have vision changes or ringing in the ears. As with any medicine, there is a very remote chance of a vaccine causing a severe allergic reaction, other serious injury, or death. 5. What if there is a serious problem? An allergic reaction could occur after the vaccinated person leaves the clinic.  If you see signs of a severe allergic reaction (hives, swelling of the face and throat, difficulty breathing, a fast heartbeat, dizziness, or weakness), call 9-1-1 and get the person to the nearest hospital. 
 
For other signs that concern you, call your health care provider. Adverse reactions should be reported to the Vaccine Adverse Event Reporting System (VAERS). Your health care provider will usually file this report, or you can do it yourself. Visit the VAERS website at www.vaers. hhs.gov or call 3-630.139.8534. VAERS is only for reporting reactions, and VAERS staff do not give medical advice. 6. The National Vaccine Injury Compensation Program 
 
The [facility] Vaccine Injury Compensation Program (VICP) is a federal program that was created to compensate people who may have been injured by certain vaccines. Visit the VICP website at www.hrsa.gov/vaccinecompensation or call 7-156.465.6495 to learn about the program and about filing a claim. There is a time limit to file a claim for compensation. 7. How can I learn more?  Ask your health care provider.  Call your local or state health department.  Contact the Centers for Disease Control and Prevention (CDC): 
- Call 5-375.380.4592 (1-800-CDC-INFO) or 
- Visit CDCs influenza website at www.cdc.gov/flu Vaccine Information Statement (Interim) Inactivated Influenza Vaccine 8/15/2019 
[address] SH[address] [id number] Department of Health and [place] Centers for Disease Control and Prevention Office Use Only

## 2020-10-29 NOTE — PROGRESS NOTES
Patient presents for the following vaccines: Influenza. Patient consent obtained by patient. Patient tolerated procedure well at left deltoid. Patient remained in exam room, during vaccine documentation, for period of 10 minutes post injection to ensure no reaction. VIS was given to patient.     Lot # [id number] [employer]  Exp: 2021  MF [address]: [id number]

## 2020-10-29 NOTE — PROGRESS NOTES
David Aguirre, 49 y.o.,  female    SUBJECTIVE  Ff-up    DM- reports -130's. She is currently taking jardiance, metformin and recent addition of trulicity. She is hesitant increasing dose of trulicity and hoping to change sglt2 as she is having some intermittent vaginal itching on jardiance. HL- currently on zetia, been intolerant to statins. Reports father CAD/CABG    Depression/anxiety-she reports about the same, she takes zoloft 100-150 mg depending on stressors. And very occasional use of  taking ativan. 12 pills x 6 months    HTN- taking norvasc 7.5 mg, and losartan 100 mg. GERD- doing well on prilosec. Asthma- is doing well, no recent exacerbation, She is on advair. On singulair and zyrtec as well for allergies. Vit d def- she is s/p gastric bypass, says taking 50,000 iu vit d 2 x a week. Per pt utd with gyne care with dr. Marisol Arita:  See HPI, all others negative        Patient Active Problem List   Diagnosis Code    HTN (hypertension) I10    Allergic rhinitis J30.9    Depression F32.9    S/P gastric bypass Z98.84    Anxiety F41.9    Iron deficiency E61.1    Vitamin D deficiency E55.9    Irregular menses N92.6    Vertigo R42    Onychomycosis B35.1    Impaired glucose tolerance R73.02    Rosacea L71.9    Gastroesophageal reflux disease without esophagitis K21.9    Asthma J45.909    Obesity, morbid ([facility] [address] E66.01    Recurrent depression ([facility] [address] F33.9    Pure hypercholesterolemia E78.00    Controlled type 2 diabetes mellitus without complication, without long-term current use of insulin (HCC) E11.9       Current Outpatient Medications   Medication Sig Dispense Refill    cholecalciferol, vitamin D3, (VITAMIN D3 PO) Take  by mouth.  ZINC PO Take  by mouth.  ferrous sulfate (IRON PO) Take  by mouth.  POTASSIUM-CALCIUM-MAGNESIUM PO Take  by mouth.       glucose blood VI test strips (ASCENSIA AUTODISC VI, ONE TOUCH ULTRA TEST VI) strip Check glucose level once daily 100 Strip 3    dapagliflozin (FARXIGA) 10 mg tab tablet Take 1 Tab by mouth daily. 90 Tab 0    omeprazole (PRILOSEC) 20 mg capsule Take 1 Cap by mouth daily. 90 Cap 0    sertraline (Zoloft) 100 mg tablet Take 1.5 Tabs by mouth daily. 135 Tab 0    ergocalciferol (ERGOCALCIFEROL) 1,250 mcg (50,000 unit) capsule Take 1 Cap by mouth two (2) days a week. 24 Cap 0    losartan (COZAAR) 100 mg tablet Take 1 Tab by mouth daily. 90 Tab 0    dulaglutide (Trulicity) 1.17 ZA/8.7 mL sub-q pen 0.5 mL by SubCUTAneous route every seven (7) days. 4 Syringe 0    ALPRAZolam (XANAX) 0.5 mg tablet Take 1 Tab by mouth three (3) times daily as needed for Anxiety. Max Daily Amount: 1.5 mg. 12 Tab 0    fluticasone-Salmeterol (Advair HFA) 45-21 mcg/actuation inhaler Take 2 Puffs by inhalation two (2) times a day. 3 Inhaler 0    fluticasone propionate (Flonase) 50 mcg/actuation nasal spray 2 Sprays by Both Nostrils route daily. 3 Bottle 0    ezetimibe (ZETIA) 10 mg tablet Take 1 Tab by mouth daily. 90 Tab 0    montelukast (Singulair) 10 mg tablet Take 1 Tab by mouth daily. 90 Tab 0    metFORMIN ER (GLUCOPHAGE XR) 500 mg tablet Take 4 Tabs by mouth daily (with dinner). 360 Tab 0    amLODIPine (Norvasc) 5 mg tablet Take 1 Tab by mouth daily. 90 Tab 0    amLODIPine (NORVASC) 2.5 mg tablet Take 1 Tab by mouth daily. 90 Tab 0    albuterol (PROVENTIL HFA, VENTOLIN HFA, PROAIR HFA) 90 mcg/actuation inhaler Take 1-2 Puffs by inhalation every four (4) hours as needed for Wheezing or Shortness of Breath. 3 Inhaler 0    B.infantis-B.ani-B.long-B.bifi 10-15 mg TbEC Take  by mouth.  CETIRIZINE HCL (ZYRTEC PO) Take  by mouth daily.  ibuprofen (MOTRIN) 200 mg tablet Take 600 mg by mouth every six (6) hours as needed.  CYANOCOBALAMIN (VITAMIN B-12 SL) by SubLINGual route.          Allergies   Allergen Reactions    Entex [Phenylephrine-Guaifenesin] Unknown (comments)    Simvastatin Myalgia       Past Medical History:   Diagnosis Date    Asthma     Depression     Anxiety    GERD (gastroesophageal reflux disease)     Hypercholesterolemia     Hypertension        Social History     Socioeconomic History    Marital status:      Spouse name: Not on file    Number of children: Not on file    Years of education: Not on file    Highest education level: Not on file   Occupational History    Not on file   Social Needs    Financial resource strain: Not on file    Food insecurity     Worry: Not on file     Inability: Not on file    Transportation needs     Medical: Not on file     Non-medical: Not on file   Tobacco Use    Smoking status: Never Smoker    Smokeless tobacco: Never Used   Substance and Sexual Activity    Alcohol use: Yes     Comment: 2 drinks per month    Drug use: No    Sexual activity: Not Currently     Partners: Male   Lifestyle    Physical activity     Days per week: Not on file     Minutes per session: Not on file    Stress: Not on file   Relationships    Social connections     Talks on phone: Not on file     Gets together: Not on file     Attends Buddhism service: Not on file     Active member of club or organization: Not on file     Attends meetings of clubs or organizations: Not on file     Relationship status: Not on file    Intimate partner violence     Fear of current or ex partner: Not on file     Emotionally abused: Not on file     Physically abused: Not on file     Forced sexual activity: Not on file   Other Topics Concern    Not on file   Social History Narrative    Not on file       Family History   Problem Relation Age of Onset    Asthma Mother     High Cholesterol Mother     Hypertension Mother     Thyroid Disease Mother     Cancer Mother 79        Thyroid Cancer    Depression Father     High Cholesterol Father     Hypertension Father     Colon Polyps Father     Depression Brother     High Cholesterol Brother     Hypertension Brother     Breast Cancer Maternal Grandmother     High Cholesterol Maternal Grandmother     Hypertension Maternal Grandmother     Diabetes Maternal Grandmother     Cancer Maternal Grandfather         prostate    High Cholesterol Maternal Grandfather     Hypertension Maternal Grandfather     Diabetes Maternal Grandfather     High Cholesterol Paternal Grandmother     Hypertension Paternal Grandmother     Diabetes Paternal Grandmother     Thyroid Disease Paternal Grandmother     High Cholesterol Paternal Grandfather     Hypertension Paternal Grandfather     Diabetes Paternal Grandfather          OBJECTIVE    Physical Exam:     Visit Vitals  Visit Vitals  /80 (BP 1 Location: Left arm, BP Patient Position: Sitting)   Pulse 81   Temp 98.9 °F (37.2 °C) (Oral)   Resp 16   Ht 5' 8\" (1.727 m)   Wt 297 lb (134.7 kg)   LMP 11/05/2016   SpO2 95%   BMI 45.16 kg/m²       General: alert, obese,  in no apparent distress or pain  CVS: normal rate, regular rhythm, distinct S1 and S2  Lungs:clear to ausculation bilaterally, no crackles, wheezing or rhonchi noted  Abdomen: soft, NT, ND  Feet: no lesions, normal monofilament  Skin: warm, no lesions, rashes noted  Psych:  mood and affect normal    Results for orders placed or performed during the hospital encounter of 10/23/20   [facility] SPECIMEN COL   Result Value Ref Range    [facility]. Specimens collected/sent to [facility]. Please direct inquiries to ([phone]). ASSESSMENT/PLAN  Mariam Lamb was seen today for other, hypertension, anxiety and depression.     Diagnoses and all orders for this visit:    Diabetes mellitus without complication, without long term current use of insulin  suboptimal 7.5  Cont metformin, switch jardiance to farxiga (discussed both are SGLT2 with potential se yeast infection)  Advised to increase trulicity dose, pt is hesitant and wants to work on lifestyle efforts  ADA diet discussed  eye exam-- Dr. Roderick Syed cmp, a1c, lipid panel in 3 months    Essential hypertension-  controlled  Cont orvasc to 7.5 mg (5 mg+  2.5 mg tab)  Cont losartan 100 mg  monitoring    Hyperlipidemia  Not at goal LDL< 100  Cont zetia  intolerant to statin    S/P gastric bypass    Vitamin D Deficiency   s/p gastric bypass  Currently on  vitamin d to 50k twice a week    Recurrent Depression  Fair control  Cont zoloft to 100-150 mg zoloft mg,   On prn ativan 12 pills x 6 months,advised to wean off  Offered counseling, pt did not benefit previously wants to hold off  Monitoring    Anxiety    Allergic rhinitis, unspecified allergic rhinitis type  Cont singulair/zyrtec    Gastroesophageal reflux disease without esophagitis-  Stable, Cont PPI    Asthma, mild intermittent, uncomplicated  Controlled, cont advair, prn albuterol      Morbid Obesity HCC  Encouraged wt loss/diet    Encounter for immunization  Flu vaccine given    Ff-up in 3 months or sooner prn    Follow-up and Dispositions    · Return in about 3 months (around 1/29/2021), or if symptoms worsen or fail to improve, for fasting labs a week prior to your next visit, routine chronic illness care. Patient understands plan of care. Patient has provided input and agrees with goals.

## 2020-11-16 DIAGNOSIS — I10 ESSENTIAL HYPERTENSION: ICD-10-CM

## 2020-11-17 RX ORDER — AMLODIPINE BESYLATE 2.5 MG/1
2.5 TABLET ORAL DAILY
Qty: 90 TAB | Refills: 0 | Status: SHIPPED | OUTPATIENT
Start: 2020-11-17 | End: 2021-02-07 | Stop reason: SDUPTHER

## 2020-11-17 RX ORDER — AMLODIPINE BESYLATE 5 MG/1
5 TABLET ORAL DAILY
Qty: 90 TAB | Refills: 0 | Status: SHIPPED | OUTPATIENT
Start: 2020-11-17 | End: 2021-02-07 | Stop reason: SDUPTHER

## 2020-11-30 DIAGNOSIS — I10 ESSENTIAL HYPERTENSION: ICD-10-CM

## 2020-11-30 DIAGNOSIS — J45.20 MILD INTERMITTENT ASTHMA WITHOUT COMPLICATION: ICD-10-CM

## 2020-11-30 RX ORDER — METFORMIN HYDROCHLORIDE 500 MG/1
2000 TABLET, EXTENDED RELEASE ORAL
Qty: 360 TAB | Refills: 0 | Status: SHIPPED | OUTPATIENT
Start: 2020-11-30 | End: 2021-02-22 | Stop reason: SDUPTHER

## 2020-11-30 RX ORDER — DULAGLUTIDE 0.75 MG/.5ML
0.75 INJECTION, SOLUTION SUBCUTANEOUS
Qty: 4 SYRINGE | Refills: 0 | Status: SHIPPED | OUTPATIENT
Start: 2020-11-30 | End: 2020-12-30 | Stop reason: SDUPTHER

## 2020-11-30 RX ORDER — EZETIMIBE 10 MG/1
10 TABLET ORAL DAILY
Qty: 90 TAB | Refills: 0 | Status: SHIPPED | OUTPATIENT
Start: 2020-11-30 | End: 2021-02-22 | Stop reason: SDUPTHER

## 2020-11-30 RX ORDER — MONTELUKAST SODIUM 10 MG/1
10 TABLET ORAL DAILY
Qty: 90 TAB | Refills: 0 | Status: SHIPPED | OUTPATIENT
Start: 2020-11-30 | End: 2021-02-28 | Stop reason: SDUPTHER

## 2020-12-30 DIAGNOSIS — F41.9 ANXIETY: ICD-10-CM

## 2020-12-31 RX ORDER — DULAGLUTIDE 0.75 MG/.5ML
0.75 INJECTION, SOLUTION SUBCUTANEOUS
Qty: 4 SYRINGE | Refills: 0 | Status: SHIPPED
Start: 2020-12-31 | End: 2021-12-22 | Stop reason: DRUGHIGH

## 2020-12-31 RX ORDER — ALPRAZOLAM 0.5 MG/1
0.5 TABLET ORAL
Qty: 12 TAB | Refills: 0 | Status: SHIPPED | OUTPATIENT
Start: 2020-12-31 | End: 2021-02-28 | Stop reason: SDUPTHER

## 2021-01-18 RX ORDER — LOSARTAN POTASSIUM 100 MG/1
100 TABLET ORAL DAILY
Qty: 90 TAB | Refills: 0 | Status: SHIPPED | OUTPATIENT
Start: 2021-01-18 | End: 2021-04-01 | Stop reason: SDUPTHER

## 2021-01-18 RX ORDER — ERGOCALCIFEROL 1.25 MG/1
50000 CAPSULE ORAL
Qty: 24 CAP | Refills: 0 | Status: SHIPPED | OUTPATIENT
Start: 2021-01-21 | End: 2021-04-17 | Stop reason: SDUPTHER

## 2021-01-18 RX ORDER — OMEPRAZOLE 20 MG/1
20 CAPSULE, DELAYED RELEASE ORAL DAILY
Qty: 90 CAP | Refills: 0 | Status: SHIPPED | OUTPATIENT
Start: 2021-01-18 | End: 2021-04-17 | Stop reason: SDUPTHER

## 2021-01-26 DIAGNOSIS — F41.9 ANXIETY: ICD-10-CM

## 2021-01-26 RX ORDER — ALPRAZOLAM 0.5 MG/1
0.5 TABLET ORAL
Qty: 12 TAB | Refills: 0 | OUTPATIENT
Start: 2021-01-26

## 2021-02-07 DIAGNOSIS — I10 ESSENTIAL HYPERTENSION: ICD-10-CM

## 2021-02-08 RX ORDER — AMLODIPINE BESYLATE 2.5 MG/1
2.5 TABLET ORAL DAILY
Qty: 90 TAB | Refills: 0 | Status: SHIPPED | OUTPATIENT
Start: 2021-02-08 | End: 2021-04-01 | Stop reason: SDUPTHER

## 2021-02-08 RX ORDER — AMLODIPINE BESYLATE 5 MG/1
5 TABLET ORAL DAILY
Qty: 90 TAB | Refills: 0 | Status: SHIPPED | OUTPATIENT
Start: 2021-02-08 | End: 2021-02-22 | Stop reason: SDUPTHER

## 2021-02-08 NOTE — TELEPHONE ENCOUNTER
Last OV 10/29/2020  Next OV none scheduled   Patient due for appt 01/29/2021  My Chart message sent tot patient advising on scheduling over due appt.

## 2021-02-23 RX ORDER — METFORMIN HYDROCHLORIDE 500 MG/1
2000 TABLET, EXTENDED RELEASE ORAL
Qty: 360 TAB | Refills: 0 | Status: SHIPPED | OUTPATIENT
Start: 2021-02-23 | End: 2021-05-22 | Stop reason: SDUPTHER

## 2021-02-23 RX ORDER — AMLODIPINE BESYLATE 5 MG/1
5 TABLET ORAL DAILY
Qty: 90 TAB | Refills: 0 | Status: SHIPPED | OUTPATIENT
Start: 2021-02-23 | End: 2021-02-28 | Stop reason: SDUPTHER

## 2021-02-23 RX ORDER — EZETIMIBE 10 MG/1
10 TABLET ORAL DAILY
Qty: 90 TAB | Refills: 0 | Status: SHIPPED | OUTPATIENT
Start: 2021-02-23 | End: 2021-04-01 | Stop reason: SDUPTHER

## 2021-02-23 NOTE — TELEPHONE ENCOUNTER
Last OV: 10-  Last labs: 10-3-2020  Next OV and labs:appointment schedule with Jenn Arias for 24- at 2 pm     Patient states she saw salud and has a follow up on 04-

## 2021-02-28 DIAGNOSIS — J45.20 MILD INTERMITTENT ASTHMA WITHOUT COMPLICATION: ICD-10-CM

## 2021-02-28 DIAGNOSIS — F41.9 ANXIETY: ICD-10-CM

## 2021-02-28 DIAGNOSIS — I10 ESSENTIAL HYPERTENSION: ICD-10-CM

## 2021-03-01 RX ORDER — MONTELUKAST SODIUM 10 MG/1
10 TABLET ORAL DAILY
Qty: 90 TAB | Refills: 0 | Status: SHIPPED | OUTPATIENT
Start: 2021-03-01 | End: 2021-05-22 | Stop reason: SDUPTHER

## 2021-03-01 RX ORDER — AMLODIPINE BESYLATE 5 MG/1
5 TABLET ORAL DAILY
Qty: 90 TAB | Refills: 0 | Status: SHIPPED | OUTPATIENT
Start: 2021-03-01 | End: 2021-03-14 | Stop reason: SDUPTHER

## 2021-03-01 RX ORDER — ALPRAZOLAM 0.5 MG/1
0.5 TABLET ORAL
Qty: 12 TAB | Refills: 0 | Status: SHIPPED | OUTPATIENT
Start: 2021-03-01 | End: 2021-05-07 | Stop reason: SDUPTHER

## 2021-03-13 ENCOUNTER — HOSPITAL ENCOUNTER (OUTPATIENT)
Dept: LAB | Age: 56
Discharge: HOME | End: 2021-03-13

## 2021-03-13 LAB — XX-LABCORP SPECIMEN COL,LCBCF: NORMAL

## 2021-03-13 PROCEDURE — 99001 SPECIMEN HANDLING PT-LAB: CPT

## 2021-03-15 LAB
ALBUMIN SERPL-MCNC: 4 G/DL (ref 3.8–4.9)
ALBUMIN/GLOB SERPL: 1.9 {RATIO} (ref 1.2–2.2)
ALP SERPL-CCNC: 118 IU/L (ref 39–117)
ALT SERPL-CCNC: 37 IU/L (ref 0–32)
AST SERPL-CCNC: 22 IU/L (ref 0–40)
BILIRUB SERPL-MCNC: 0.4 MG/DL (ref 0–1.2)
BUN SERPL-MCNC: 11 MG/DL (ref 6–24)
BUN/CREAT SERPL: 14 (ref 9–23)
CALCIUM SERPL-MCNC: 9 MG/DL (ref 8.7–10.2)
CHLORIDE SERPL-SCNC: 105 MMOL/L (ref 96–106)
CHOLEST SERPL-MCNC: 203 MG/DL (ref 100–199)
CO2 SERPL-SCNC: 21 MMOL/L (ref 20–29)
CREAT SERPL-MCNC: 0.77 MG/DL (ref 0.57–1)
EST. AVERAGE GLUCOSE BLD GHB EST-MCNC: 151 MG/DL
GLOBULIN SER CALC-MCNC: 2.1 G/DL (ref 1.5–4.5)
GLUCOSE SERPL-MCNC: 125 MG/DL (ref 65–99)
HBA1C MFR BLD: 6.9 % (ref 4.8–5.6)
HDLC SERPL-MCNC: 46 MG/DL
IMP & REVIEW OF LAB RESULTS: NORMAL
LDLC SERPL CALC-MCNC: 122 MG/DL (ref 0–99)
Lab: NORMAL
POTASSIUM SERPL-SCNC: 4.6 MMOL/L (ref 3.5–5.2)
PROT SERPL-MCNC: 6.1 G/DL (ref 6–8.5)
SODIUM SERPL-SCNC: 142 MMOL/L (ref 134–144)
TRIGL SERPL-MCNC: 198 MG/DL (ref 0–149)
VLDLC SERPL CALC-MCNC: 35 MG/DL (ref 5–40)

## 2021-03-15 RX ORDER — FLUTICASONE PROPIONATE 50 MCG
2 SPRAY, SUSPENSION (ML) NASAL DAILY
Qty: 1 BOTTLE | Refills: 0 | Status: SHIPPED | OUTPATIENT
Start: 2021-03-15 | End: 2021-04-01 | Stop reason: SDUPTHER

## 2021-03-15 RX ORDER — FLUTICASONE PROPIONATE AND SALMETEROL XINAFOATE 45; 21 UG/1; UG/1
2 AEROSOL, METERED RESPIRATORY (INHALATION) 2 TIMES DAILY
Qty: 1 INHALER | Refills: 0 | Status: SHIPPED | OUTPATIENT
Start: 2021-03-15 | End: 2021-04-01 | Stop reason: SDUPTHER

## 2021-03-15 RX ORDER — AMLODIPINE BESYLATE 5 MG/1
5 TABLET ORAL DAILY
Qty: 30 TAB | Refills: 0 | Status: SHIPPED | OUTPATIENT
Start: 2021-03-15 | End: 2021-04-01 | Stop reason: SDUPTHER

## 2021-03-15 RX ORDER — SERTRALINE HYDROCHLORIDE 100 MG/1
150 TABLET, FILM COATED ORAL DAILY
Qty: 135 TAB | Refills: 0 | Status: SHIPPED | OUTPATIENT
Start: 2021-03-15 | End: 2021-07-18 | Stop reason: SDUPTHER

## 2021-03-15 NOTE — PROGRESS NOTES
DM has improved, cholesterol still elevated. Overdue for ff-up. pls sched and will discuss in greater detail then.

## 2021-03-17 NOTE — PROGRESS NOTES
Patient identified with 2 identifiers (name and ).   Patient aware of DM has improved, cholesterol still elevated

## 2021-03-31 ENCOUNTER — TELEPHONE (OUTPATIENT)
Dept: FAMILY MEDICINE CLINIC | Age: 56
End: 2021-03-31

## 2021-04-01 ENCOUNTER — OFFICE VISIT (OUTPATIENT)
Dept: FAMILY MEDICINE CLINIC | Age: 56
End: 2021-04-01
Payer: COMMERCIAL

## 2021-04-01 VITALS
HEART RATE: 79 BPM | SYSTOLIC BLOOD PRESSURE: 120 MMHG | TEMPERATURE: 97.8 F | WEIGHT: 286 LBS | DIASTOLIC BLOOD PRESSURE: 76 MMHG | HEIGHT: 68 IN | RESPIRATION RATE: 16 BRPM | BODY MASS INDEX: 43.35 KG/M2 | OXYGEN SATURATION: 98 %

## 2021-04-01 DIAGNOSIS — J45.20 MILD INTERMITTENT ASTHMA WITHOUT COMPLICATION: ICD-10-CM

## 2021-04-01 DIAGNOSIS — E66.01 OBESITY, MORBID (HCC): ICD-10-CM

## 2021-04-01 DIAGNOSIS — E11.9 CONTROLLED TYPE 2 DIABETES MELLITUS WITHOUT COMPLICATION, WITHOUT LONG-TERM CURRENT USE OF INSULIN (HCC): ICD-10-CM

## 2021-04-01 DIAGNOSIS — E11.9 CONTROLLED TYPE 2 DIABETES MELLITUS WITHOUT COMPLICATION, WITHOUT LONG-TERM CURRENT USE OF INSULIN (HCC): Primary | ICD-10-CM

## 2021-04-01 DIAGNOSIS — Z98.84 S/P GASTRIC BYPASS: ICD-10-CM

## 2021-04-01 DIAGNOSIS — F41.9 ANXIETY: ICD-10-CM

## 2021-04-01 DIAGNOSIS — K21.9 GASTROESOPHAGEAL REFLUX DISEASE WITHOUT ESOPHAGITIS: ICD-10-CM

## 2021-04-01 DIAGNOSIS — F32.0 CURRENT MILD EPISODE OF MAJOR DEPRESSIVE DISORDER WITHOUT PRIOR EPISODE (HCC): ICD-10-CM

## 2021-04-01 DIAGNOSIS — I10 ESSENTIAL HYPERTENSION: ICD-10-CM

## 2021-04-01 DIAGNOSIS — E78.00 PURE HYPERCHOLESTEROLEMIA: ICD-10-CM

## 2021-04-01 DIAGNOSIS — F33.9 RECURRENT DEPRESSION (HCC): ICD-10-CM

## 2021-04-01 PROCEDURE — 99214 OFFICE O/P EST MOD 30 MIN: CPT | Performed by: FAMILY MEDICINE

## 2021-04-01 RX ORDER — FLUTICASONE PROPIONATE AND SALMETEROL XINAFOATE 45; 21 UG/1; UG/1
2 AEROSOL, METERED RESPIRATORY (INHALATION) 2 TIMES DAILY
Qty: 3 INHALER | Refills: 1 | Status: SHIPPED | OUTPATIENT
Start: 2021-04-01 | End: 2021-05-24 | Stop reason: SDUPTHER

## 2021-04-01 RX ORDER — LOSARTAN POTASSIUM 100 MG/1
100 TABLET ORAL DAILY
Qty: 90 TAB | Refills: 1 | Status: SHIPPED | OUTPATIENT
Start: 2021-04-01 | End: 2021-07-18 | Stop reason: SDUPTHER

## 2021-04-01 RX ORDER — AMLODIPINE BESYLATE 5 MG/1
5 TABLET ORAL DAILY
Qty: 90 TAB | Refills: 1 | Status: SHIPPED | OUTPATIENT
Start: 2021-04-01 | End: 2021-04-17 | Stop reason: SDUPTHER

## 2021-04-01 RX ORDER — EZETIMIBE 10 MG/1
10 TABLET ORAL DAILY
Qty: 90 TAB | Refills: 3 | Status: SHIPPED | OUTPATIENT
Start: 2021-04-01 | End: 2021-05-07 | Stop reason: SDUPTHER

## 2021-04-01 RX ORDER — AMLODIPINE BESYLATE 2.5 MG/1
2.5 TABLET ORAL DAILY
Qty: 90 TAB | Refills: 1 | Status: SHIPPED | OUTPATIENT
Start: 2021-04-01 | End: 2021-04-17 | Stop reason: SDUPTHER

## 2021-04-01 RX ORDER — FLUTICASONE PROPIONATE 50 MCG
2 SPRAY, SUSPENSION (ML) NASAL DAILY
Qty: 3 BOTTLE | Refills: 1 | Status: SHIPPED | OUTPATIENT
Start: 2021-04-01 | End: 2021-05-24 | Stop reason: SDUPTHER

## 2021-04-01 NOTE — PROGRESS NOTES
Mac He, 48 y.o.,  female    SUBJECTIVE  Ff-up    DM-  She is currently taking metformin and trulicity, recent increase dose to 1.5 mg weekly. Says now following endocrinology. HL- currently on zetia, been intolerant to statins. Reports father CAD/CABG. She has lost some weight. Reviewed labs     Depression/anxiety-she reports about the same, she takes zoloft 100-150 mg depending on stressors. And very occasional use of  taking ativan. 12 pills x 6 months    HTN- taking norvasc 7.5 mg, and losartan 100 mg. GERD- doing well on prilosec. Asthma- is doing well, no recent exacerbation, She is on advair. On singulair and zyrtec as well for allergies. Vit d def- she is s/p gastric bypass, says taking 50,000 iu vit d 2 x a week. Per pt utd with gyne care with dr. Nancy Paz:  See HPI, all others negative        Patient Active Problem List   Diagnosis Code    HTN (hypertension) I10    Allergic rhinitis J30.9    Depression F32.9    S/P gastric bypass Z98.84    Anxiety F41.9    Iron deficiency E61.1    Vitamin D deficiency E55.9    Irregular menses N92.6    Vertigo R42    Onychomycosis B35.1    Impaired glucose tolerance R73.02    Rosacea L71.9    Gastroesophageal reflux disease without esophagitis K21.9    Asthma J45.909    Obesity, morbid ([facility] [address] E66.01    Recurrent depression ([facility] [address] F33.9    Pure hypercholesterolemia E78.00    Controlled type 2 diabetes mellitus without complication, without long-term current use of insulin (HCC) E11.9       Current Outpatient Medications   Medication Sig Dispense Refill    amLODIPine (Norvasc) 5 mg tablet Take 1 Tab by mouth daily. 90 Tab 1    amLODIPine (NORVASC) 2.5 mg tablet Take 1 Tab by mouth daily. 90 Tab 1    fluticasone-Salmeterol (Advair HFA) 45-21 mcg/actuation inhaler Take 2 Puffs by inhalation two (2) times a day.  3 Inhaler 1    fluticasone propionate (Flonase) 50 mcg/actuation nasal spray 2 Sprays by Both Nostrils route daily. 3 Bottle 1    ezetimibe (ZETIA) 10 mg tablet Take 1 Tab by mouth daily. 90 Tab 3    losartan (COZAAR) 100 mg tablet Take 1 Tab by mouth daily. 90 Tab 1    sertraline (Zoloft) 100 mg tablet Take 1.5 Tabs by mouth daily. 135 Tab 0    montelukast (Singulair) 10 mg tablet Take 1 Tab by mouth daily. 90 Tab 0    ALPRAZolam (XANAX) 0.5 mg tablet Take 1 Tab by mouth three (3) times daily as needed for Anxiety. Max Daily Amount: 1.5 mg. 12 Tab 0    metFORMIN ER (GLUCOPHAGE XR) 500 mg tablet Take 4 Tabs by mouth daily (with dinner). 360 Tab 0    omeprazole (PRILOSEC) 20 mg capsule Take 1 Cap by mouth daily. 90 Cap 0    ergocalciferol (ERGOCALCIFEROL) 1,250 mcg (50,000 unit) capsule Take 1 Cap by mouth two (2) days a week. 24 Cap 0    dulaglutide (Trulicity) 0.28 OU/4.7 mL sub-q pen 0.5 mL by SubCUTAneous route every seven (7) days. 4 Syringe 0    cholecalciferol, vitamin D3, (VITAMIN D3 PO) Take  by mouth.  ZINC PO Take  by mouth.  ferrous sulfate (IRON PO) Take  by mouth.  POTASSIUM-CALCIUM-MAGNESIUM PO Take  by mouth.  glucose blood VI test strips (ASCENSIA AUTODISC VI, ONE TOUCH ULTRA TEST VI) strip Check glucose level once daily 100 Strip 3    dapagliflozin (FARXIGA) 10 mg tab tablet Take 1 Tab by mouth daily. 90 Tab 0    albuterol (PROVENTIL HFA, VENTOLIN HFA, PROAIR HFA) 90 mcg/actuation inhaler Take 1-2 Puffs by inhalation every four (4) hours as needed for Wheezing or Shortness of Breath. 3 Inhaler 0    B.infantis-B.ani-B.long-B.bifi 10-15 mg TbEC Take  by mouth.  CETIRIZINE HCL (ZYRTEC PO) Take  by mouth daily.  CYANOCOBALAMIN (VITAMIN B-12 SL) by SubLINGual route.  ibuprofen (MOTRIN) 200 mg tablet Take 600 mg by mouth every six (6) hours as needed.          Allergies   Allergen Reactions    Entex [Phenylephrine-Guaifenesin] Unknown (comments)    Simvastatin Myalgia       Past Medical History:   Diagnosis Date    Asthma     Depression     Anxiety    GERD (gastroesophageal reflux disease)     Hypercholesterolemia     Hypertension        Social History     Socioeconomic History    Marital status:      Spouse name: Not on file    Number of children: Not on file    Years of education: Not on file    Highest education level: Not on file   Occupational History    Not on file   Social Needs    Financial resource strain: Not on file    Food insecurity     Worry: Not on file     Inability: Not on file    Transportation needs     Medical: Not on file     Non-medical: Not on file   Tobacco Use    Smoking status: Never Smoker    Smokeless tobacco: Never Used   Substance and Sexual Activity    Alcohol use: Yes     Comment: 2 drinks per month    Drug use: No    Sexual activity: Not Currently     Partners: Male   Lifestyle    Physical activity     Days per week: Not on file     Minutes per session: Not on file    Stress: Not on file   Relationships    Social connections     Talks on phone: Not on file     Gets together: Not on file     Attends Gnosticist service: Not on file     Active member of club or organization: Not on file     Attends meetings of clubs or organizations: Not on file     Relationship status: Not on file    Intimate partner violence     Fear of current or ex partner: Not on file     Emotionally abused: Not on file     Physically abused: Not on file     Forced sexual activity: Not on file   Other Topics Concern    Not on file   Social History Narrative    Not on file       Family History   Problem Relation Age of Onset    Asthma Mother     High Cholesterol Mother     Hypertension Mother     Thyroid Disease Mother     Cancer Mother 79        Thyroid Cancer    Depression Father     High Cholesterol Father     Hypertension Father     Colon Polyps Father     Depression Brother     High Cholesterol Brother     Hypertension Brother     Breast Cancer Maternal Grandmother     High Cholesterol Maternal Grandmother     Hypertension Maternal Grandmother     Diabetes Maternal Grandmother     Cancer Maternal Grandfather         prostate    High Cholesterol Maternal Grandfather     Hypertension Maternal Grandfather     Diabetes Maternal Grandfather     High Cholesterol Paternal Grandmother     Hypertension Paternal Grandmother     Diabetes Paternal Grandmother     Thyroid Disease Paternal Grandmother     High Cholesterol Paternal Grandfather     Hypertension Paternal Grandfather     Diabetes Paternal Grandfather          OBJECTIVE    Physical Exam:     Visit Vitals  /76 (BP 1 Location: Left upper arm, BP Patient Position: Sitting, BP Cuff Size: Adult)   Pulse 79   Temp 97.8 °F (36.6 °C) (Oral)   Resp 16   Ht 5' 8\" (1.727 m)   Wt 286 lb (129.7 kg)   LMP 11/05/2016   SpO2 98%   BMI 43.49 kg/m²       General: alert, obese,  in no apparent distress or pain  CVS: normal rate, regular rhythm, distinct S1 and S2  Lungs:clear to ausculation bilaterally, no crackles, wheezing or rhonchi noted  Abdomen: soft, NT, ND  Feet: no lesions, normal monofilament  Skin: warm, no lesions, rashes noted  Psych:  mood and affect normal    Results for orders placed or performed during the hospital encounter of 03/13/21   [facility] SPECIMEN COL   Result Value Ref Range    [facility] SPEC[facility]. Specimens collected/sent to [facility]. Please direct inquiries to ([phone]). ASSESSMENT/PLAN  Navid Marie was seen today for other, hypertension, anxiety and depression.     Diagnoses and all orders for this visit:    Diabetes mellitus without complication, without long term current use of insulin   7.5>6.9  Cont metformin, trulicity,   ADA diet discussed  eye exam-- Dr. Dotty Vernon cmp, a1c in 6 months prior to next visit  To inform us name of endo group she is following      Essential hypertension-  controlled  Cont norvasc to 7.5 mg (5 mg+  2.5 mg tab)  Cont losartan 100 mg  monitoring    Hyperlipidemia  Not at goal LDL< 100  Cont zetia  intolerant to statin    S/P gastric bypass    Vitamin D Deficiency   s/p gastric bypass  Currently on  vitamin d to 50k twice a week    Recurrent Depression  Fair control  Cont zoloft to 100-150 mg zoloft mg,   On prn ativan 12 pills x 6 months,advised to wean off  Offered counseling, pt did not benefit previously wants to hold off  Monitoring    Anxiety    Allergic rhinitis, unspecified allergic rhinitis type  Cont singulair/zyrtec    Gastroesophageal reflux disease without esophagitis-  Stable, Cont PPI    Asthma, mild intermittent, uncomplicated  Controlled, cont advair, prn albuterol      Morbid Obesity HCC  Encouraged wt loss/diet    Follow-up and Dispositions    · Return in about 6 months (around 10/1/2021), or if symptoms worsen or fail to improve, for routine chronic illness care, non-fasting labs prior to your next visit. Patient understands plan of care. Patient has provided input and agrees with goals.

## 2021-04-01 NOTE — PROGRESS NOTES
1. Have you been to the ER, urgent care clinic since your last visit? Hospitalized since your last visit? No    2. Have you seen or consulted any other health care providers outside of the [address] [place] [address] since your last visit? Include any pap smears or colon screening.  No

## 2021-04-01 NOTE — PATIENT INSTRUCTIONS
Learning About Carbohydrate (Carb) Counting and Eating Out When You Have Diabetes Why plan your meals? Meal planning can be a key part of managing diabetes. Planning meals and snacks with the right balance of carbohydrate, protein, and fat can help you keep your blood sugar at the target level you set with your doctor. You don't have to eat special foods. You can eat what your family eats, including sweets once in a while. But you do have to pay attention to how often you eat and how much you eat of certain foods. You may want to work with a dietitian or a certified diabetes educator. He or she can give you tips and meal ideas and can answer your questions about meal planning. This health professional can also help you reach a healthy weight if that is one of your goals. What should you know about eating carbs? Managing the amount of carbohydrate (carbs) you eat is an important part of healthy meals when you have diabetes. Carbohydrate is found in many foods. · Learn which foods have carbs. And learn the amounts of carbs in different foods. ? Bread, cereal, pasta, and rice have about 15 grams of carbs in a serving. A serving is 1 slice of bread (1 ounce), ½ cup of cooked cereal, or 1/3 cup of cooked pasta or rice. ? Fruits have 15 grams of carbs in a serving. A serving is 1 small fresh fruit, such as an apple or orange; ½ of a banana; ½ cup of cooked or canned fruit; ½ cup of fruit juice; 1 cup of melon or raspberries; or 2 tablespoons of dried fruit. ? Milk and no-sugar-added yogurt have 15 grams of carbs in a serving. A serving is 1 cup of milk or 2/3 cup of no-sugar-added yogurt. ? Starchy vegetables have 15 grams of carbs in a serving. A serving is ½ cup of mashed potatoes or sweet potato; 1 cup winter squash; ½ of a small baked potato; ½ cup of cooked beans; or ½ cup cooked corn or green peas.  
· Learn how much carbs to eat each day and at each meal. A dietitian or CDE can teach you how to keep track of the amount of carbs you eat. This is called carbohydrate counting. · If you are not sure how to count carbohydrate grams, use the Plate Method to plan meals. It is a good, quick way to make sure that you have a balanced meal. It also helps you spread carbs throughout the day. ? Divide your plate by types of foods. Put non-starchy vegetables on half the plate, meat or other protein food on one-quarter of the plate, and a grain or starchy vegetable in the final quarter of the plate. To this you can add a small piece of fruit and 1 cup of milk or yogurt, depending on how many carbs you are supposed to eat at a meal. 
· Try to eat about the same amount of carbs at each meal. Do not \"save up\" your daily allowance of carbs to eat at one meal. 
· Proteins have very little or no carbs per serving. Examples of proteins are beef, chicken, turkey, fish, eggs, tofu, cheese, cottage cheese, and peanut butter. A serving size of meat is 3 ounces, which is about the size of a deck of cards. Examples of meat substitute serving sizes (equal to 1 ounce of meat) are 1/4 cup of cottage cheese, 1 egg, 1 tablespoon of peanut butter, and ½ cup of tofu. How can you eat out and still eat healthy? · Learn to estimate the serving sizes of foods that have carbohydrate. If you measure food at home, it will be easier to estimate the amount in a serving of restaurant food. · If the meal you order has too much carbohydrate (such as potatoes, corn, or baked beans), ask to have a low-carbohydrate food instead. Ask for a salad or green vegetables. · If you use insulin, check your blood sugar before and after eating out to help you plan how much to eat in the future. · If you eat more carbohydrate at a meal than you had planned, take a walk or do other exercise. This will help lower your blood sugar. What are some tips for eating healthy? · Limit saturated fat, such as the fat from meat and dairy products.  This is a healthy choice because people who have diabetes are at higher risk of heart disease. So choose lean cuts of meat and nonfat or low-fat dairy products. Use olive or canola oil instead of butter or shortening when cooking. · Don't skip meals. Your blood sugar may drop too low if you skip meals and take insulin or certain medicines for diabetes. · Check with your doctor before you drink alcohol. Alcohol can cause your blood sugar to drop too low. Alcohol can also cause a bad reaction if you take certain diabetes medicines. Follow-up care is a key part of your treatment and safety. Be sure to make and go to all appointments, and call your doctor if you are having problems. It's also a good idea to know your test results and keep a list of the medicines you take. Where can you learn more? Go to http://www.gray.com/ Enter P216 in the search box to learn more about \"Learning About Carbohydrate (Carb) Counting and Eating Out When You Have Diabetes. \" Current as of: August 31, 2020               Content Version: 12.8 © 2006-2021 Healthwise, Incorporated. Care instructions adapted under license by [employer] (which disclaims liability or warranty for this information). If you have questions about a medical condition or this instruction, always ask your healthcare professional. [address] any warranty or liability for your use of this information.

## 2021-05-22 ENCOUNTER — PATIENT MESSAGE (OUTPATIENT)
Dept: FAMILY MEDICINE CLINIC | Age: 56
End: 2021-05-22

## 2021-05-22 DIAGNOSIS — I10 ESSENTIAL HYPERTENSION: ICD-10-CM

## 2021-05-24 RX ORDER — AMLODIPINE BESYLATE 5 MG/1
5 TABLET ORAL DAILY
Qty: 90 TABLET | Refills: 1 | Status: SHIPPED | OUTPATIENT
Start: 2021-05-24 | End: 2021-08-09 | Stop reason: SDUPTHER

## 2021-05-24 RX ORDER — FLUTICASONE PROPIONATE 50 MCG
2 SPRAY, SUSPENSION (ML) NASAL DAILY
Qty: 3 BOTTLE | Refills: 1 | Status: SHIPPED | OUTPATIENT
Start: 2021-05-24 | End: 2021-11-30 | Stop reason: SDUPTHER

## 2021-05-24 RX ORDER — AMLODIPINE BESYLATE 2.5 MG/1
2.5 TABLET ORAL DAILY
Qty: 90 TABLET | Refills: 1 | Status: SHIPPED | OUTPATIENT
Start: 2021-05-24 | End: 2021-12-22

## 2021-05-24 RX ORDER — FLUTICASONE PROPIONATE AND SALMETEROL XINAFOATE 45; 21 UG/1; UG/1
2 AEROSOL, METERED RESPIRATORY (INHALATION) 2 TIMES DAILY
Qty: 3 INHALER | Refills: 1 | Status: SHIPPED | OUTPATIENT
Start: 2021-05-24 | End: 2021-11-30 | Stop reason: SDUPTHER

## 2021-05-24 NOTE — TELEPHONE ENCOUNTER
----- Message from Cindy Orozco sent at 5/22/2021  9:01 AM EDT -----  Regarding: Prescription Question  Contact: [phone]  Good morning,    I have again had an issue with my prescription refills. Because of the \"No refills,\" I have to submit a new request on this site each week for the various medications. Amlodipine Besylates were not ordered due to the 5mg and 2.5mg issue. (need 7mg)  I went without any medication for two days. I am now taking the 5mg to avoid anymore confusion between your office and the pharmacy. Next month's order of Advair inhaler and Nasal Spray. .. the last reorder was wrong - only one month instead of three month supply.  $100 in copays vs $40. It is very concerning that I have to triple check, call, and write to ensure my medication and dosages are correct. There needs to be an easier and more effective way to monitor the refills.

## 2021-10-14 ENCOUNTER — HOSPITAL ENCOUNTER (OUTPATIENT)
Dept: LAB | Age: 56
Discharge: HOME | End: 2021-10-14

## 2021-10-14 LAB — XX-LABCORP SPECIMEN COL,LCBCF: NORMAL

## 2021-10-14 PROCEDURE — 99001 SPECIMEN HANDLING PT-LAB: CPT

## 2021-10-15 LAB
ALBUMIN SERPL-MCNC: 4.2 G/DL (ref 3.8–4.9)
ALBUMIN/GLOB SERPL: 1.9 {RATIO} (ref 1.2–2.2)
ALP SERPL-CCNC: 116 IU/L (ref 44–121)
ALT SERPL-CCNC: 59 IU/L (ref 0–32)
AST SERPL-CCNC: 29 IU/L (ref 0–40)
BILIRUB SERPL-MCNC: 0.6 MG/DL (ref 0–1.2)
BUN SERPL-MCNC: 14 MG/DL (ref 6–24)
BUN/CREAT SERPL: 22 (ref 9–23)
CALCIUM SERPL-MCNC: 9.1 MG/DL (ref 8.7–10.2)
CHLORIDE SERPL-SCNC: 104 MMOL/L (ref 96–106)
CO2 SERPL-SCNC: 24 MMOL/L (ref 20–29)
CREAT SERPL-MCNC: 0.63 MG/DL (ref 0.57–1)
EST. AVERAGE GLUCOSE BLD GHB EST-MCNC: 134 MG/DL
GLOBULIN SER CALC-MCNC: 2.2 G/DL (ref 1.5–4.5)
GLUCOSE SERPL-MCNC: 114 MG/DL (ref 65–99)
HBA1C MFR BLD: 6.3 % (ref 4.8–5.6)
POTASSIUM SERPL-SCNC: 4.3 MMOL/L (ref 3.5–5.2)
PROT SERPL-MCNC: 6.4 G/DL (ref 6–8.5)
SODIUM SERPL-SCNC: 143 MMOL/L (ref 134–144)
SPECIMEN STATUS REPORT, ROLRST: NORMAL

## 2021-10-21 DIAGNOSIS — E11.9 CONTROLLED TYPE 2 DIABETES MELLITUS WITHOUT COMPLICATION, WITHOUT LONG-TERM CURRENT USE OF INSULIN (HCC): Primary | ICD-10-CM

## 2021-10-21 DIAGNOSIS — Z82.49 FAMILY HISTORY OF ARRHYTHMIA: Primary | ICD-10-CM

## 2021-10-21 RX ORDER — LANCETS
EACH MISCELLANEOUS
Qty: 100 EACH | Refills: 11 | Status: SHIPPED | OUTPATIENT
Start: 2021-10-21 | End: 2022-07-04 | Stop reason: SDUPTHER

## 2021-11-02 DIAGNOSIS — F41.9 ANXIETY: ICD-10-CM

## 2021-11-02 RX ORDER — AMLODIPINE BESYLATE 5 MG/1
5 TABLET ORAL DAILY
Qty: 90 TABLET | Refills: 0 | Status: SHIPPED | OUTPATIENT
Start: 2021-11-02 | End: 2022-02-24 | Stop reason: SDUPTHER

## 2021-11-02 RX ORDER — ERGOCALCIFEROL 1.25 MG/1
50000 CAPSULE ORAL
Qty: 24 CAPSULE | Refills: 0 | Status: SHIPPED | OUTPATIENT
Start: 2021-11-04 | End: 2022-02-03 | Stop reason: SDUPTHER

## 2021-11-02 RX ORDER — ALPRAZOLAM 0.5 MG/1
0.5 TABLET ORAL
Qty: 12 TABLET | Refills: 0 | Status: SHIPPED | OUTPATIENT
Start: 2021-11-02 | End: 2022-08-21 | Stop reason: SDUPTHER

## 2021-11-30 DIAGNOSIS — I10 ESSENTIAL HYPERTENSION: ICD-10-CM

## 2021-11-30 DIAGNOSIS — J45.20 MILD INTERMITTENT ASTHMA WITHOUT COMPLICATION: ICD-10-CM

## 2021-11-30 RX ORDER — METFORMIN HYDROCHLORIDE 500 MG/1
2000 TABLET, EXTENDED RELEASE ORAL
Qty: 360 TABLET | Refills: 1 | Status: SHIPPED | OUTPATIENT
Start: 2021-11-30 | End: 2022-07-04 | Stop reason: SDUPTHER

## 2021-11-30 RX ORDER — FLUTICASONE PROPIONATE 50 MCG
2 SPRAY, SUSPENSION (ML) NASAL DAILY
Qty: 3 EACH | Refills: 1 | Status: SHIPPED | OUTPATIENT
Start: 2021-11-30 | End: 2022-05-27 | Stop reason: SDUPTHER

## 2021-11-30 RX ORDER — MONTELUKAST SODIUM 10 MG/1
10 TABLET ORAL DAILY
Qty: 90 TABLET | Refills: 1 | Status: SHIPPED | OUTPATIENT
Start: 2021-11-30 | End: 2022-05-27 | Stop reason: SDUPTHER

## 2021-11-30 RX ORDER — FLUTICASONE PROPIONATE AND SALMETEROL XINAFOATE 45; 21 UG/1; UG/1
2 AEROSOL, METERED RESPIRATORY (INHALATION) 2 TIMES DAILY
Qty: 3 EACH | Refills: 1 | Status: SHIPPED | OUTPATIENT
Start: 2021-11-30 | End: 2022-05-27 | Stop reason: SDUPTHER

## 2021-12-21 RX ORDER — SERTRALINE HYDROCHLORIDE 100 MG/1
150 TABLET, FILM COATED ORAL DAILY
Qty: 135 TABLET | Refills: 0 | Status: SHIPPED | OUTPATIENT
Start: 2021-12-21 | End: 2022-04-23 | Stop reason: SDUPTHER

## 2021-12-22 ENCOUNTER — OFFICE VISIT (OUTPATIENT)
Dept: FAMILY MEDICINE CLINIC | Age: 56
End: 2021-12-22
Payer: COMMERCIAL

## 2021-12-22 VITALS
RESPIRATION RATE: 16 BRPM | OXYGEN SATURATION: 97 % | HEIGHT: 68 IN | WEIGHT: 287 LBS | SYSTOLIC BLOOD PRESSURE: 118 MMHG | BODY MASS INDEX: 43.5 KG/M2 | DIASTOLIC BLOOD PRESSURE: 82 MMHG | HEART RATE: 72 BPM | TEMPERATURE: 97.7 F

## 2021-12-22 DIAGNOSIS — L98.9 SKIN LESION: ICD-10-CM

## 2021-12-22 DIAGNOSIS — K91.2 POSTOPERATIVE MALABSORPTION: ICD-10-CM

## 2021-12-22 DIAGNOSIS — E78.00 PURE HYPERCHOLESTEROLEMIA: ICD-10-CM

## 2021-12-22 DIAGNOSIS — K21.9 GASTROESOPHAGEAL REFLUX DISEASE WITHOUT ESOPHAGITIS: ICD-10-CM

## 2021-12-22 DIAGNOSIS — F33.9 RECURRENT DEPRESSION (HCC): ICD-10-CM

## 2021-12-22 DIAGNOSIS — E11.9 CONTROLLED TYPE 2 DIABETES MELLITUS WITHOUT COMPLICATION, WITHOUT LONG-TERM CURRENT USE OF INSULIN (HCC): Primary | ICD-10-CM

## 2021-12-22 DIAGNOSIS — I10 PRIMARY HYPERTENSION: ICD-10-CM

## 2021-12-22 DIAGNOSIS — J45.20 MILD INTERMITTENT ASTHMA WITHOUT COMPLICATION: ICD-10-CM

## 2021-12-22 DIAGNOSIS — E66.01 OBESITY, MORBID (HCC): ICD-10-CM

## 2021-12-22 PROCEDURE — 99214 OFFICE O/P EST MOD 30 MIN: CPT | Performed by: FAMILY MEDICINE

## 2021-12-22 RX ORDER — DULAGLUTIDE 1.5 MG/.5ML
INJECTION, SOLUTION SUBCUTANEOUS
COMMUNITY
Start: 2021-10-31

## 2021-12-22 NOTE — PROGRESS NOTES
1. Have you been to the ER, urgent care clinic since your last visit? Hospitalized since your last visit? No    2. Have you seen or consulted any other health care providers outside of the [address] [place] [address] since your last visit? Include any pap smears or colon screening. [facility] endocrine

## 2021-12-22 NOTE — PROGRESS NOTES
Juan Urrutia, 55 y.o.,  female    SUBJECTIVE  Ff-up    DM-  She is currently taking metformin and trulicity, recent. following endocrinology. HL- currently on zetia, been intolerant to statins. Reports father/brother CAD/CABG. She has lost some weight. Reviewed labs . Has upcoming appt with cardiology dr Franco Quezada in feb. Denies symptoms, no chest pain, sob. Depression/anxiety-she reports about the same, she takes zoloft 100-150 mg depending on stressors. And very occasional use of  taking ativan. 12 pills x 6 months    HTN- taking norvasc 5 mg, and losartan 100 mg. GERD- doing well on prilosec. Asthma- is doing well, no recent exacerbation, She is on advair. On singulair and zyrtec as well for allergies. Vit d def- she is s/p gastric bypass, says taking 50,000 iu vit d 2 x a week. Per pt utd with [facility] care with dr. nayla ROBINS:  See HPI, all others negative        Patient Active Problem List   Diagnosis Code    HTN (hypertension) I10    Allergic rhinitis J30.9    Depression F32. A    S/P gastric bypass Z98.84    Anxiety F41.9    Iron deficiency E61.1    Vitamin D deficiency E55.9    Irregular menses N92.6    Vertigo R42    Onychomycosis B35.1    Impaired glucose tolerance R73.02    Rosacea L71.9    Gastroesophageal reflux disease without esophagitis K21.9    Asthma J45.909    Obesity, morbid ([facility]) E66.01    Recurrent depression ([facility]) F33.9    Pure hypercholesterolemia E78.00    Controlled type 2 diabetes mellitus without complication, without long-term current use of insulin (HCC) E11.9       Current Outpatient Medications   Medication Sig Dispense Refill    Trulicity 1.5 XX/1.7 mL sub-q pen       sertraline (Zoloft) 100 mg tablet Take 1.5 Tablets by mouth daily. 135 Tablet 0    metFORMIN ER (GLUCOPHAGE XR) 500 mg tablet Take 4 Tablets by mouth daily (with dinner). 360 Tablet 1    montelukast (Singulair) 10 mg tablet Take 1 Tablet by mouth daily.  90 Tablet 1    fluticasone-Salmeterol (Advair HFA) 45-21 mcg/actuation inhaler Take 2 Puffs by inhalation two (2) times a day. 3 Each 1    fluticasone propionate (Flonase) 50 mcg/actuation nasal spray 2 Sprays by Both Nostrils route daily. 3 Each 1    ALPRAZolam (XANAX) 0.5 mg tablet Take 1 Tablet by mouth three (3) times daily as needed for Anxiety. Max Daily Amount: 1.5 mg. 12 Tablet 0    ergocalciferol (ERGOCALCIFEROL) 1,250 mcg (50,000 unit) capsule Take 1 Capsule by mouth two (2) days a week. 24 Capsule 0    dapagliflozin (FARXIGA) 10 mg tab tablet Take 1 Tablet by mouth daily. 90 Tablet 0    amLODIPine (Norvasc) 5 mg tablet Take 1 Tablet by mouth daily. 90 Tablet 0    lancets misc Use as directed once daily 100 Each 11    glucose blood VI test strips (ASCENSIA AUTODISC VI, ONE TOUCH ULTRA TEST VI) strip Check glucose level once daily 100 Strip 3    losartan (COZAAR) 100 mg tablet Take 1 Tablet by mouth daily. 90 Tablet 0    albuterol (PROVENTIL HFA, VENTOLIN HFA, PROAIR HFA) 90 mcg/actuation inhaler Take 1-2 Puffs by inhalation every four (4) hours as needed for Wheezing or Shortness of Breath. 3 Inhaler 0    ezetimibe (ZETIA) 10 mg tablet Take 1 Tablet by mouth daily. 90 Tablet 3    omeprazole (PRILOSEC) 20 mg capsule Take 1 Capsule by mouth daily. 90 Capsule 3    cholecalciferol, vitamin D3, (VITAMIN D3 PO) Take  by mouth.  ZINC PO Take  by mouth.  ferrous sulfate (IRON PO) Take  by mouth.  POTASSIUM-CALCIUM-MAGNESIUM PO Take  by mouth.  B.infantis-B.ani-B.long-B.bifi 10-15 mg TbEC Take  by mouth.  CETIRIZINE HCL (ZYRTEC PO) Take  by mouth daily.  CYANOCOBALAMIN (VITAMIN B-12 SL) by SubLINGual route.  amLODIPine (NORVASC) 2.5 mg tablet Take 1 Tablet by mouth daily. 90 Tablet 1    dulaglutide (Trulicity) 6.05 OC/5.7 mL sub-q pen 0.5 mL by SubCUTAneous route every seven (7) days.  4 Syringe 0       Allergies   Allergen Reactions    Entex [Phenylephrine-Guaifenesin] Unknown (comments)    Simvastatin Myalgia       Past Medical History:   Diagnosis Date    Asthma     Depression     Anxiety    GERD (gastroesophageal reflux disease)     Hypercholesterolemia     Hypertension        Social History     Socioeconomic History    Marital status:      Spouse name: Not on file    Number of children: Not on file    Years of education: Not on file    Highest education level: Not on file   Occupational History    Not on file   Tobacco Use    Smoking status: Never Smoker    Smokeless tobacco: Never Used   Substance and Sexual Activity    Alcohol use: Yes     Comment: 2 drinks per month    Drug use: No    Sexual activity: Not Currently     Partners: Male   Other Topics Concern    Not on file   Social History Narrative    Not on file     Social Determinants of Health     Financial Resource Strain:     Difficulty of Paying Living Expenses: Not on file   Food Insecurity:     Worried About Running Out of Food in the Last Year: Not on file    [place] of Food in the Last Year: Not on file   Transportation Needs:     Lack of Transportation (Medical): Not on file    Lack of Transportation (Non-Medical):  Not on file   Physical Activity:     Days of Exercise per Week: Not on file    Minutes of Exercise per Session: Not on file   Stress:     Feeling of Stress : Not on file   Social Connections:     Frequency of Communication with Friends and Family: Not on file    Frequency of Social Gatherings with Friends and Family: Not on file    Attends Holiness Services: Not on file    Active Member of Clubs or Organizations: Not on file    Attends Club or Organization Meetings: Not on file    Marital Status: Not on file   Intimate Partner Violence:     Fear of Current or Ex-Partner: Not on file    Emotionally Abused: Not on file    Physically Abused: Not on file    Sexually Abused: Not on file   Housing Stability:     Unable to Pay for Housing in the Last Year: Not on file    Number of Places Lived in the Last Year: Not on file    Unstable Housing in the Last Year: Not on file       Family History   Problem Relation Age of Onset    Asthma Mother     High Cholesterol Mother     Hypertension Mother     Thyroid Disease Mother     Cancer Mother 79        Thyroid Cancer    Depression Father     High Cholesterol Father     Hypertension Father     Colon Polyps Father     Depression Brother     High Cholesterol Brother     Hypertension Brother     Breast Cancer Maternal Grandmother     High Cholesterol Maternal Grandmother     Hypertension Maternal Grandmother     Diabetes Maternal Grandmother     Cancer Maternal Grandfather         prostate    High Cholesterol Maternal Grandfather     Hypertension Maternal Grandfather     Diabetes Maternal Grandfather     High Cholesterol Paternal Grandmother     Hypertension Paternal Grandmother     Diabetes Paternal Grandmother     Thyroid Disease Paternal Grandmother     High Cholesterol Paternal Grandfather     Hypertension Paternal Grandfather     Diabetes Paternal Grandfather          OBJECTIVE    Physical Exam:     Visit Vitals  /82 (BP 1 Location: Left upper arm, BP Patient Position: Sitting, BP Cuff Size: Adult)   Pulse 72   Temp 97.7 °F (36.5 °C) (Oral)   Resp 16   Ht 5' 8\" (1.727 m)   Wt 287 lb (130.2 kg)   LMP 11/05/2016   SpO2 97%   BMI 43.64 kg/m²       General: alert, obese,  in no apparent distress or pain  CVS: normal rate, regular rhythm, distinct S1 and S2  Lungs:clear to ausculation bilaterally, no crackles, wheezing or rhonchi noted  Abdomen: soft, NT, ND  Skin: warm, + scaly hyperpigment patch L chest area  Psych:  mood and affect normal    Results for orders placed or performed during the hospital encounter of 10/14/21   [facility] SPECIMEN COL   Result Value Ref Range    [facility]. Specimens collected/sent to [facility]. Please direct inquiries to ([phone]). ASSESSMENT/PLAN  Cecilia Solis was seen today for other, hypertension, anxiety and depression. Diagnoses and all orders for this visit:    Diabetes mellitus without complication, without long term current use of insulin   7.5>6.9>6.3  Cont metformin, trulicity,   ADA diet discussed  eye exam-- Dr. Kinza Brown cmp, a1c, lipid panel, microalbumin prior to next visit  To inform us name of endo group she is following  -     METABOLIC PANEL, COMPREHENSIVE; Future  -     LIPID PANEL; Future  -     MICROALBUMIN, UR, RAND W/ MICROALB/CREAT RATIO; Future  -     HEMOGLOBIN A1C WITH EAG; Future    Primary hypertension-  controlled  Cont norvasc   Cont losartan   monitoring    Hyperlipidemia  Not at goal LDL< 100  Cont zetia  intolerant to statin    S/P gastric bypass    Postoperative malabsorption  -     VITAMIN B1, WHOLE BLOOD; Future  -     VITAMIN B12 & FOLATE; Future  -     VITAMIN D, 25 HYDROXY; Future  -     FERRITIN; Future  -     IRON PROFILE; Future  -     CBC WITH AUTOMATED DIFF; Future    Skin lesion  -     REFERRAL TO DERMATOLOGY    Vitamin D Deficiency   s/p gastric bypass  Currently on  vitamin d to 50k twice a week    Recurrent Depression  Fair control  Cont zoloft to 100-150 mg zoloft mg,   On prn ativan 12 pills x 6 months,advised to wean off  Monitoring    Anxiety    Allergic rhinitis, unspecified allergic rhinitis type  Cont singulair/zyrtec    Gastroesophageal reflux disease without esophagitis-  Stable, Cont PPI    Asthma, mild intermittent, uncomplicated  Controlled, cont advair, prn albuterol      Morbid Obesity HCC  Encouraged wt loss/diet    Follow-up and Dispositions    · Return in about 3 months (around 3/22/2022), or if symptoms worsen or fail to improve, for routine chronic illness care, fasting labs a week prior to your next visit. Patient understands plan of care. Patient has provided input and agrees with goals.

## 2021-12-22 NOTE — PATIENT INSTRUCTIONS
Learning About Carbohydrate (Carb) Counting and Eating Out When You Have Diabetes  Why plan your meals? Meal planning can be a key part of managing diabetes. Planning meals and snacks with the right balance of carbohydrate, protein, and fat can help you keep your blood sugar at the target level you set with your doctor. You don't have to eat special foods. You can eat what your family eats, including sweets once in a while. But you do have to pay attention to how often you eat and how much you eat of certain foods. You may want to work with a dietitian or a certified diabetes educator. He or she can give you tips and meal ideas and can answer your questions about meal planning. This health professional can also help you reach a healthy weight if that is one of your goals. What should you know about eating carbs? Managing the amount of carbohydrate (carbs) you eat is an important part of healthy meals when you have diabetes. Carbohydrate is found in many foods. · Learn which foods have carbs. And learn the amounts of carbs in different foods. ? Bread, cereal, pasta, and rice have about 15 grams of carbs in a serving. A serving is 1 slice of bread (1 ounce), ½ cup of cooked cereal, or 1/3 cup of cooked pasta or rice. ? Fruits have 15 grams of carbs in a serving. A serving is 1 small fresh fruit, such as an apple or orange; ½ of a banana; ½ cup of cooked or canned fruit; ½ cup of fruit juice; 1 cup of melon or raspberries; or 2 tablespoons of dried fruit. ? Milk and no-sugar-added yogurt have 15 grams of carbs in a serving. A serving is 1 cup of milk or 2/3 cup of no-sugar-added yogurt. ? Starchy vegetables have 15 grams of carbs in a serving. A serving is ½ cup of mashed potatoes or sweet potato; 1 cup winter squash; ½ of a small baked potato; ½ cup of cooked beans; or ½ cup cooked corn or green peas.   · Learn how much carbs to eat each day and at each meal. A dietitian or CDE can teach you how to keep track of the amount of carbs you eat. This is called carbohydrate counting. · If you are not sure how to count carbohydrate grams, use the Plate Method to plan meals. It is a good, quick way to make sure that you have a balanced meal. It also helps you spread carbs throughout the day. ? Divide your plate by types of foods. Put non-starchy vegetables on half the plate, meat or other protein food on one-quarter of the plate, and a grain or starchy vegetable in the final quarter of the plate. To this you can add a small piece of fruit and 1 cup of milk or yogurt, depending on how many carbs you are supposed to eat at a meal.  · Try to eat about the same amount of carbs at each meal. Do not \"save up\" your daily allowance of carbs to eat at one meal.  · Proteins have very little or no carbs per serving. Examples of proteins are beef, chicken, turkey, fish, eggs, tofu, cheese, cottage cheese, and peanut butter. A serving size of meat is 3 ounces, which is about the size of a deck of cards. Examples of meat substitute serving sizes (equal to 1 ounce of meat) are 1/4 cup of cottage cheese, 1 egg, 1 tablespoon of peanut butter, and ½ cup of tofu. How can you eat out and still eat healthy? · Learn to estimate the serving sizes of foods that have carbohydrate. If you measure food at home, it will be easier to estimate the amount in a serving of restaurant food. · If the meal you order has too much carbohydrate (such as potatoes, corn, or baked beans), ask to have a low-carbohydrate food instead. Ask for a salad or green vegetables. · If you use insulin, check your blood sugar before and after eating out to help you plan how much to eat in the future. · If you eat more carbohydrate at a meal than you had planned, take a walk or do other exercise. This will help lower your blood sugar. What are some tips for eating healthy? · Limit saturated fat, such as the fat from meat and dairy products.  This is a healthy choice because people who have diabetes are at higher risk of heart disease. So choose lean cuts of meat and nonfat or low-fat dairy products. Use olive or canola oil instead of butter or shortening when cooking. · Don't skip meals. Your blood sugar may drop too low if you skip meals and take insulin or certain medicines for diabetes. · Check with your doctor before you drink alcohol. Alcohol can cause your blood sugar to drop too low. Alcohol can also cause a bad reaction if you take certain diabetes medicines. Follow-up care is a key part of your treatment and safety. Be sure to make and go to all appointments, and call your doctor if you are having problems. It's also a good idea to know your test results and keep a list of the medicines you take. Where can you learn more? Go to http://www.garnett.com/  Enter I147 in the search box to learn more about \"Learning About Carbohydrate (Carb) Counting and Eating Out When You Have Diabetes. \"  Current as of: December 17, 2020               Content Version: 13.0  © 2006-2021 Healthwise, Incorporated. Care instructions adapted under license by [employer] (which disclaims liability or warranty for this information). If you have questions about a medical condition or this instruction, always ask your healthcare professional. [address] any warranty or liability for your use of this information.

## 2022-01-20 RX ORDER — LOSARTAN POTASSIUM 100 MG/1
100 TABLET ORAL DAILY
Qty: 90 TABLET | Refills: 0 | Status: SHIPPED | OUTPATIENT
Start: 2022-01-20 | End: 2022-04-23 | Stop reason: SDUPTHER

## 2022-02-07 RX ORDER — ERGOCALCIFEROL 1.25 MG/1
50000 CAPSULE ORAL
Qty: 24 CAPSULE | Refills: 0 | Status: SHIPPED | OUTPATIENT
Start: 2022-02-10 | End: 2022-05-01 | Stop reason: SDUPTHER

## 2022-02-07 RX ORDER — ERGOCALCIFEROL 1.25 MG/1
50000 CAPSULE ORAL
Qty: 24 CAPSULE | Refills: 0 | Status: SHIPPED
Start: 2022-02-10 | End: 2022-03-22 | Stop reason: SDUPTHER

## 2022-02-28 RX ORDER — AMLODIPINE BESYLATE 5 MG/1
5 TABLET ORAL DAILY
Qty: 90 TABLET | Refills: 0 | Status: SHIPPED | OUTPATIENT
Start: 2022-02-28 | End: 2022-05-27 | Stop reason: SDUPTHER

## 2022-03-17 ENCOUNTER — HOSPITAL ENCOUNTER (OUTPATIENT)
Dept: LAB | Age: 57
Discharge: HOME | End: 2022-03-17

## 2022-03-17 DIAGNOSIS — E11.9 CONTROLLED TYPE 2 DIABETES MELLITUS WITHOUT COMPLICATION, WITHOUT LONG-TERM CURRENT USE OF INSULIN (HCC): ICD-10-CM

## 2022-03-17 DIAGNOSIS — K91.2 POSTOPERATIVE MALABSORPTION: ICD-10-CM

## 2022-03-17 LAB — XX-LABCORP SPECIMEN COL,LCBCF: NORMAL

## 2022-03-17 PROCEDURE — 99001 SPECIMEN HANDLING PT-LAB: CPT

## 2022-03-18 LAB
25(OH)D3+25(OH)D2 SERPL-MCNC: 33.9 NG/ML (ref 30–100)
ALBUMIN SERPL-MCNC: 4.1 G/DL (ref 3.8–4.8)
ALBUMIN/GLOB SERPL: 2.3 {RATIO} (ref 1.2–2.2)
ALP SERPL-CCNC: 115 IU/L (ref 44–121)
ALT SERPL-CCNC: 40 IU/L (ref 0–32)
AST SERPL-CCNC: 26 IU/L (ref 0–40)
BASOPHILS # BLD AUTO: 0.1 X10E3/UL (ref 0–0.2)
BASOPHILS NFR BLD AUTO: 1 %
BILIRUB SERPL-MCNC: 0.5 MG/DL (ref 0–1.2)
BUN SERPL-MCNC: 18 MG/DL (ref 8–27)
BUN/CREAT SERPL: 26 (ref 12–28)
CALCIUM SERPL-MCNC: 9.2 MG/DL (ref 8.7–10.3)
CHLORIDE SERPL-SCNC: 106 MMOL/L (ref 96–106)
CHOLEST SERPL-MCNC: 214 MG/DL (ref 100–199)
CO2 SERPL-SCNC: 18 MMOL/L (ref 20–29)
CREAT SERPL-MCNC: 0.68 MG/DL (ref 0.57–1)
EGFR: 97 ML/MIN/1.73
EOSINOPHIL # BLD AUTO: 0.2 X10E3/UL (ref 0–0.4)
EOSINOPHIL NFR BLD AUTO: 3 %
ERYTHROCYTE [DISTWIDTH] IN BLOOD BY AUTOMATED COUNT: 12.5 % (ref 11.7–15.4)
EST. AVERAGE GLUCOSE BLD GHB EST-MCNC: 137 MG/DL
FERRITIN SERPL-MCNC: 80 NG/ML (ref 15–150)
FOLATE SERPL-MCNC: 18.6 NG/ML
GLOBULIN SER CALC-MCNC: 1.8 G/DL (ref 1.5–4.5)
GLUCOSE SERPL-MCNC: 120 MG/DL (ref 65–99)
HBA1C MFR BLD: 6.4 % (ref 4.8–5.6)
HCT VFR BLD AUTO: 44 % (ref 34–46.6)
HDLC SERPL-MCNC: 42 MG/DL
HGB BLD-MCNC: 14.7 G/DL (ref 11.1–15.9)
IMM GRANULOCYTES # BLD AUTO: 0 X10E3/UL (ref 0–0.1)
IMM GRANULOCYTES NFR BLD AUTO: 0 %
IMP & REVIEW OF LAB RESULTS: NORMAL
INTERPRETATION: NORMAL
IRON SATN MFR SERPL: 22 % (ref 15–55)
IRON SERPL-MCNC: 71 UG/DL (ref 27–139)
LDLC SERPL CALC-MCNC: 130 MG/DL (ref 0–99)
LYMPHOCYTES # BLD AUTO: 1.4 X10E3/UL (ref 0.7–3.1)
LYMPHOCYTES NFR BLD AUTO: 17 %
MCH RBC QN AUTO: 30.4 PG (ref 26.6–33)
MCHC RBC AUTO-ENTMCNC: 33.4 G/DL (ref 31.5–35.7)
MCV RBC AUTO: 91 FL (ref 79–97)
MONOCYTES # BLD AUTO: 0.5 X10E3/UL (ref 0.1–0.9)
MONOCYTES NFR BLD AUTO: 7 %
NEUTROPHILS # BLD AUTO: 6 X10E3/UL (ref 1.4–7)
NEUTROPHILS NFR BLD AUTO: 72 %
PLATELET # BLD AUTO: 295 X10E3/UL (ref 150–450)
POTASSIUM SERPL-SCNC: 4.5 MMOL/L (ref 3.5–5.2)
PROT SERPL-MCNC: 5.9 G/DL (ref 6–8.5)
RBC # BLD AUTO: 4.84 X10E6/UL (ref 3.77–5.28)
SODIUM SERPL-SCNC: 142 MMOL/L (ref 134–144)
TIBC SERPL-MCNC: 320 UG/DL (ref 250–450)
TRIGL SERPL-MCNC: 234 MG/DL (ref 0–149)
UIBC SERPL-MCNC: 249 UG/DL (ref 118–369)
VIT B12 SERPL-MCNC: 1448 PG/ML (ref 232–1245)
VLDLC SERPL CALC-MCNC: 42 MG/DL (ref 5–40)
WBC # BLD AUTO: 8.2 X10E3/UL (ref 3.4–10.8)

## 2022-03-19 PROBLEM — F33.9 RECURRENT DEPRESSION (HCC): Status: ACTIVE | Noted: 2017-12-21

## 2022-03-19 PROBLEM — E66.01 OBESITY, MORBID (HCC): Status: ACTIVE | Noted: 2017-12-21

## 2022-03-19 PROBLEM — E11.9 CONTROLLED TYPE 2 DIABETES MELLITUS WITHOUT COMPLICATION, WITHOUT LONG-TERM CURRENT USE OF INSULIN (HCC): Status: ACTIVE | Noted: 2020-06-08

## 2022-03-20 PROBLEM — E78.00 PURE HYPERCHOLESTEROLEMIA: Status: ACTIVE | Noted: 2017-12-21

## 2022-03-22 ENCOUNTER — OFFICE VISIT (OUTPATIENT)
Dept: FAMILY MEDICINE CLINIC | Age: 57
End: 2022-03-22
Payer: COMMERCIAL

## 2022-03-22 VITALS
BODY MASS INDEX: 42.89 KG/M2 | HEIGHT: 68 IN | WEIGHT: 283 LBS | OXYGEN SATURATION: 98 % | HEART RATE: 68 BPM | DIASTOLIC BLOOD PRESSURE: 78 MMHG | TEMPERATURE: 97 F | RESPIRATION RATE: 16 BRPM | SYSTOLIC BLOOD PRESSURE: 122 MMHG

## 2022-03-22 DIAGNOSIS — E66.01 OBESITY, MORBID (HCC): ICD-10-CM

## 2022-03-22 DIAGNOSIS — F33.9 RECURRENT DEPRESSION (HCC): ICD-10-CM

## 2022-03-22 DIAGNOSIS — E78.1 HYPERTRIGLYCERIDEMIA: Primary | ICD-10-CM

## 2022-03-22 DIAGNOSIS — R94.39 POSITIVE CARDIAC STRESS TEST: ICD-10-CM

## 2022-03-22 DIAGNOSIS — F41.9 ANXIETY: ICD-10-CM

## 2022-03-22 DIAGNOSIS — E11.9 CONTROLLED TYPE 2 DIABETES MELLITUS WITHOUT COMPLICATION, WITHOUT LONG-TERM CURRENT USE OF INSULIN (HCC): ICD-10-CM

## 2022-03-22 DIAGNOSIS — E55.9 VITAMIN D DEFICIENCY: ICD-10-CM

## 2022-03-22 DIAGNOSIS — Z98.84 S/P GASTRIC BYPASS: ICD-10-CM

## 2022-03-22 DIAGNOSIS — E66.01 OBESITY, CLASS III, BMI 40-49.9 (MORBID OBESITY) (HCC): ICD-10-CM

## 2022-03-22 DIAGNOSIS — I10 PRIMARY HYPERTENSION: ICD-10-CM

## 2022-03-22 DIAGNOSIS — E61.1 IRON DEFICIENCY: ICD-10-CM

## 2022-03-22 DIAGNOSIS — E78.00 PURE HYPERCHOLESTEROLEMIA: ICD-10-CM

## 2022-03-22 PROCEDURE — 3044F HG A1C LEVEL LT 7.0%: CPT | Performed by: FAMILY MEDICINE

## 2022-03-22 PROCEDURE — 99214 OFFICE O/P EST MOD 30 MIN: CPT | Performed by: FAMILY MEDICINE

## 2022-03-22 RX ORDER — ROSUVASTATIN CALCIUM 5 MG/1
5 TABLET, COATED ORAL
Qty: 90 TABLET | Refills: 0 | Status: SHIPPED | OUTPATIENT
Start: 2022-03-22 | End: 2022-08-09

## 2022-03-22 RX ORDER — ICOSAPENT ETHYL 1000 MG/1
2 CAPSULE ORAL 2 TIMES DAILY WITH MEALS
Qty: 360 CAPSULE | Refills: 0 | Status: SHIPPED | OUTPATIENT
Start: 2022-03-22 | End: 2022-11-02 | Stop reason: SDUPTHER

## 2022-03-22 NOTE — PROGRESS NOTES
1. Have you been to the ER, urgent care clinic since your last visit? Hospitalized since your last visit? No    2. Have you seen or consulted any other health care providers outside of the [address] [place] [address] since your last visit? Include any pap smears or colon screening.  Dr. Bhupendra Cardenas Card[facility]  Failed stress test,   Cardiac cath scheduled 04/11/2022

## 2022-03-22 NOTE — PROGRESS NOTES
Levon Barajas, 55 y.o.,  female    SUBJECTIVE  Ff-up    DM-  She is currently taking metformin and trulicity, recent. following endocrinology. Reviewed labs a1c 6.4    HL- currently on zetia, been intolerant to statins. Reports father/brother CAD/CABG. She has lost some weight. Reviewed labs  . Has. Denies symptoms, no chest pain, sob. Positive cardiac stress test, upcoming cath dr Chano Sheets 4/11/2022    Depression/anxiety-she reports about the same, she takes zoloft 100-150 mg depending on stressors. And very occasional use of  taking ativan. 12 pills x 6 months    HTN- taking norvasc 5 mg, and losartan 100 mg. GERD- doing well on prilosec. Asthma- is doing well, no recent exacerbation, She is on advair. On singulair and zyrtec as well for allergies. Vit d def- she is s/p gastric bypass, says taking 50,000 iu vit d 2 x a week. Per pt utd with [facility] care with dr. nayla ROBINS:  See HPI, all others negative        Patient Active Problem List   Diagnosis Code    HTN (hypertension) I10    Allergic rhinitis J30.9    Depression F32. A    S/P gastric bypass Z98.84    Anxiety F41.9    Iron deficiency E61.1    Vitamin D deficiency E55.9    Irregular menses N92.6    Vertigo R42    Onychomycosis B35.1    Impaired glucose tolerance R73.02    Rosacea L71.9    Gastroesophageal reflux disease without esophagitis K21.9    Asthma J45.909    Obesity, morbid ([facility]) E66.01    Recurrent depression ([facility]) F33.9    Pure hypercholesterolemia E78.00    Controlled type 2 diabetes mellitus without complication, without long-term current use of insulin (HCC) E11.9       Current Outpatient Medications   Medication Sig Dispense Refill    icosapent ethyL (VASCEPA) 1 gram capsule Take 2 Capsules by mouth two (2) times daily (with meals). 360 Capsule 0    rosuvastatin (CRESTOR) 5 mg tablet Take 1 Tablet by mouth nightly. 90 Tablet 0    amLODIPine (Norvasc) 5 mg tablet Take 1 Tablet by mouth daily.  80 Tablet 0    ergocalciferol (ERGOCALCIFEROL) 1,250 mcg (50,000 unit) capsule Take 1 Capsule by mouth two (2) days a week. 24 Capsule 0    dapagliflozin (FARXIGA) 10 mg tab tablet Take 1 Tablet by mouth daily. 90 Tablet 0    losartan (COZAAR) 100 mg tablet Take 1 Tablet by mouth daily. 90 Tablet 0    Trulicity 1.5 OA/8.0 mL sub-q pen       sertraline (Zoloft) 100 mg tablet Take 1.5 Tablets by mouth daily. 135 Tablet 0    metFORMIN ER (GLUCOPHAGE XR) 500 mg tablet Take 4 Tablets by mouth daily (with dinner). 360 Tablet 1    montelukast (Singulair) 10 mg tablet Take 1 Tablet by mouth daily. 90 Tablet 1    fluticasone-Salmeterol (Advair HFA) 45-21 mcg/actuation inhaler Take 2 Puffs by inhalation two (2) times a day. 3 Each 1    fluticasone propionate (Flonase) 50 mcg/actuation nasal spray 2 Sprays by Both Nostrils route daily. 3 Each 1    ALPRAZolam (XANAX) 0.5 mg tablet Take 1 Tablet by mouth three (3) times daily as needed for Anxiety. Max Daily Amount: 1.5 mg. 12 Tablet 0    lancets misc Use as directed once daily 100 Each 11    glucose blood VI test strips (ASCENSIA AUTODISC VI, ONE TOUCH ULTRA TEST VI) strip Check glucose level once daily 100 Strip 3    albuterol (PROVENTIL HFA, VENTOLIN HFA, PROAIR HFA) 90 mcg/actuation inhaler Take 1-2 Puffs by inhalation every four (4) hours as needed for Wheezing or Shortness of Breath. 3 Inhaler 0    ezetimibe (ZETIA) 10 mg tablet Take 1 Tablet by mouth daily. 90 Tablet 3    omeprazole (PRILOSEC) 20 mg capsule Take 1 Capsule by mouth daily. 90 Capsule 3    cholecalciferol, vitamin D3, (VITAMIN D3 PO) Take  by mouth.  ZINC PO Take  by mouth.  ferrous sulfate (IRON PO) Take  by mouth.  POTASSIUM-CALCIUM-MAGNESIUM PO Take  by mouth.  B.infantis-B.ani-B.long-B.bifi 10-15 mg TbEC Take  by mouth.  CETIRIZINE HCL (ZYRTEC PO) Take  by mouth daily.  CYANOCOBALAMIN (VITAMIN B-12 SL) by SubLINGual route.          Allergies   Allergen Reactions  Entex [Phenylephrine-Guaifenesin] Unknown (comments)    Simvastatin Myalgia       Past Medical History:   Diagnosis Date    Asthma     Depression     Anxiety    GERD (gastroesophageal reflux disease)     Hypercholesterolemia     Hypertension        Social History     Socioeconomic History    Marital status:      Spouse name: Not on file    Number of children: Not on file    Years of education: Not on file    Highest education level: Not on file   Occupational History    Not on file   Tobacco Use    Smoking status: Never Smoker    Smokeless tobacco: Never Used   Substance and Sexual Activity    Alcohol use: Yes     Comment: 2 drinks per month    Drug use: No    Sexual activity: Not Currently     Partners: Male   Other Topics Concern    Not on file   Social History Narrative    Not on file     Social Determinants of Health     Financial Resource Strain:     Difficulty of Paying Living Expenses: Not on file   Food Insecurity:     Worried About Running Out of Food in the Last Year: Not on file    [place] of Food in the Last Year: Not on file   Transportation Needs:     Lack of Transportation (Medical): Not on file    Lack of Transportation (Non-Medical):  Not on file   Physical Activity:     Days of Exercise per Week: Not on file    Minutes of Exercise per Session: Not on file   Stress:     Feeling of Stress : Not on file   Social Connections:     Frequency of Communication with Friends and Family: Not on file    Frequency of Social Gatherings with Friends and Family: Not on file    Attends Caodaism Services: Not on file    Active Member of Clubs or Organizations: Not on file    Attends Club or Organization Meetings: Not on file    Marital Status: Not on file   Intimate Partner Violence:     Fear of Current or Ex-Partner: Not on file    Emotionally Abused: Not on file    Physically Abused: Not on file    Sexually Abused: Not on file   Housing Stability:     Unable to Pay for Housing in the Last Year: Not on file    Number of Places Lived in the Last Year: Not on file    Unstable Housing in the Last Year: Not on file       Family History   Problem Relation Age of Onset    Asthma Mother     High Cholesterol Mother     Hypertension Mother     Thyroid Disease Mother     Cancer Mother 79        Thyroid Cancer    Depression Father     High Cholesterol Father     Hypertension Father     Colon Polyps Father     Depression Brother     High Cholesterol Brother     Hypertension Brother     Breast Cancer Maternal Grandmother     High Cholesterol Maternal Grandmother     Hypertension Maternal Grandmother     Diabetes Maternal Grandmother     Cancer Maternal Grandfather         prostate    High Cholesterol Maternal Grandfather     Hypertension Maternal Grandfather     Diabetes Maternal Grandfather     High Cholesterol Paternal Grandmother     Hypertension Paternal Grandmother     Diabetes Paternal Grandmother     Thyroid Disease Paternal Grandmother     High Cholesterol Paternal Grandfather     Hypertension Paternal Grandfather     Diabetes Paternal Grandfather          OBJECTIVE    Physical Exam:     Visit Vitals  /78 (BP 1 Location: Left upper arm, BP Patient Position: Sitting, BP Cuff Size: Adult)   Pulse 68   Temp 97 °F (36.1 °C) (Oral)   Resp 16   Ht 5' 8\" (1.727 m)   Wt 283 lb (128.4 kg)   LMP 11/05/2016   SpO2 98%   BMI 43.03 kg/m²       General: alert, obese,  in no apparent distress or pain  CVS: normal rate, regular rhythm, distinct S1 and S2  Lungs:clear to ausculation bilaterally, no crackles, wheezing or rhonchi noted  Abdomen: soft, NT, ND  Skin: warm, no rash  Psych:  mood and affect normal    Results for orders placed or performed during the hospital encounter of 03/17/22   [facility] SPECIMEN COL   Result Value Ref Range    [facility] SPECIM[facility]. Specimens collected/sent to [facility]. Please direct inquiries to ([phone]). ASSESSMENT/PLAN  [facility] [address] was seen today for other, hypertension, anxiety and depression. Diagnoses and all orders for this visit:    Diabetes mellitus without complication, without long term current use of insulin   7.5>6.9>6.3>6.4  Cont metformin, trulicity   ADA diet discussed  eye exam-- Dr. Lorna Kingston cmp, a1c, lipid panel, microalbumin prior to next visit  To inform us name of endo group she is following  -     METABOLIC PANEL, COMPREHENSIVE; Future  -     LIPID PANEL; Future  -     MICROALBUMIN, UR, RAND W/ MICROALB/CREAT RATIO; Future  -     HEMOGLOBIN A1C WITH EAG; Future    Primary hypertension-  controlled  Cont norvasc   Cont losartan   monitoring  -     icosapent ethyL (VASCEPA) 1 gram capsule; Take 2 Capsules by mouth two (2) times daily (with meals). -     rosuvastatin (CRESTOR) 5 mg tablet; Take 1 Tablet by mouth nightly.  -     HEMOGLOBIN A1C WITH EAG; Future  -     METABOLIC PANEL, COMPREHENSIVE; Future  -     LIPID PANEL;  Future  -     MICROALBUMIN, UR, RAND W/ MICROALB/CREAT RATIO; Future    Hyperlipidemia   goal LDL< 70  Cont zetia  intolerant to statin, but with fam hx, positive stress test advised to retry  Start crestor 5 mg, may initiate every other night  Start vascepa for hyperTGL    S/P gastric bypass    Postoperative malabsorption    Skin lesion  -     REFERRAL TO DERMATOLOGY    Vitamin D Deficiency   s/p gastric bypass  Cont   vitamin d to 50k twice a week    Recurrent Depression  Fair control  Cont zoloft to 100-150 mg zoloft mg,   On prn ativan 12 pills x 6 months,advised to wean off  Monitoring    Anxiety    Allergic rhinitis, unspecified allergic rhinitis type  Cont singulair/zyrtec    Gastroesophageal reflux disease without esophagitis-  Stable, Cont PPI    Asthma, mild intermittent, uncomplicated  Controlled, cont advair, prn albuterol    Morbid Obesity HCC  Encouraged wt loss/diet     Hypertriglyceridemia     Obesity, Class III, BMI 40-49.9 (morbid obesity) [facility])     Positive cardiac stress test          Follow-up and Dispositions    · Return in about 3 months (around 6/22/2022) for fasting labs a week prior to your next visit, routine chronic illness care. Patient understands plan of care. Patient has provided input and agrees with goals.

## 2022-03-22 NOTE — PATIENT INSTRUCTIONS
Learning About Carbohydrate (Carb) Counting and Eating Out When You Have Diabetes  Why plan your meals? Meal planning can be a key part of managing diabetes. Planning meals and snacks with the right balance of carbohydrate, protein, and fat can help you keep your blood sugar at the target level you set with your doctor. You don't have to eat special foods. You can eat what your family eats, including sweets once in a while. But you do have to pay attention to how often you eat and how much you eat of certain foods. You may want to work with a dietitian or a diabetes educator. They can give you tips and meal ideas and can answer your questions about meal planning. This health professional can also help you reach a healthy weight if that is one of your goals. What should you know about eating carbs? Managing the amount of carbohydrate (carbs) you eat is an important part of healthy meals when you have diabetes. Carbohydrate is found in many foods. · Learn which foods have carbs. And learn the amounts of carbs in different foods. ? Bread, cereal, pasta, and rice have about 15 grams of carbs in a serving. A serving is 1 slice of bread (1 ounce), ½ cup of cooked cereal, or 1/3 cup of cooked pasta or rice. ? Fruits have 15 grams of carbs in a serving. A serving is 1 small fresh fruit, such as an apple or orange; ½ of a banana; ½ cup of cooked or canned fruit; ½ cup of fruit juice; 1 cup of melon or raspberries; or 2 tablespoons of dried fruit. ? Milk and no-sugar-added yogurt have 15 grams of carbs in a serving. A serving is 1 cup of milk or 3/4 cup (6 oz) of no-sugar-added yogurt. ? Starchy vegetables have 15 grams of carbs in a serving. A serving is ½ cup of mashed potatoes or sweet potato; 1 cup winter squash; ½ of a small baked potato; ½ cup of cooked beans; or ½ cup cooked corn or green peas.   · Learn how much carbs to eat each day and at each meal. A dietitian or certified diabetes educator can teach you how to keep track of the amount of carbs you eat. This is called carbohydrate counting. · If you are not sure how to count carbohydrate grams, use the plate method to plan meals. It is a quick way to make sure that you have a balanced meal. It also can help you manage the amount of carbohydrate you eat at meals. ? Divide your plate by types of foods. Put non-starchy vegetables on half the plate, meat or other protein food on one-quarter of the plate, and a grain or starchy vegetable in the final quarter of the plate. To this you can add a small piece of fruit and 1 cup of milk or yogurt, depending on how many carbs you are supposed to eat at a meal.  · Try to eat about the same amount of carbs at each meal. Do not \"save up\" your daily allowance of carbs to eat at one meal.  · Proteins have very little or no carbs. Examples of proteins are beef, chicken, turkey, fish, eggs, tofu, cheese, cottage cheese, and peanut butter. How can you eat out and still eat healthy? · Learn to estimate the serving sizes of foods that have carbohydrate. If you measure food at home, it will be easier to estimate the amount in a serving of restaurant food. · If the meal you order has too much carbohydrate (such as potatoes, corn, or baked beans), ask to have a low-carbohydrate food instead. Ask for a salad or non-starchy vegetables like broccoli, cauliflower, green beans, or peppers. · If you eat more carbohydrate at a meal than you had planned, take a walk or do other exercise. This will help lower your blood sugar. What are some tips for eating healthy? · Limit saturated fat, such as the fat from meat and dairy products. This is a healthy choice because people who have diabetes are at higher risk of heart disease. So choose lean cuts of meat and nonfat or low-fat dairy products. Use olive or canola oil instead of butter or shortening when cooking. · Don't skip meals.  Your blood sugar may drop too low if you skip meals and take insulin or certain medicines for diabetes. · Check with your doctor before you drink alcohol. Alcohol can cause your blood sugar to drop too low. Alcohol can also cause a bad reaction if you take certain diabetes medicines. Follow-up care is a key part of your treatment and safety. Be sure to make and go to all appointments, and call your doctor if you are having problems. It's also a good idea to know your test results and keep a list of the medicines you take. Where can you learn more? Go to http://www.garnett.com/  Enter I147 in the search box to learn more about \"Learning About Carbohydrate (Carb) Counting and Eating Out When You Have Diabetes. \"  Current as of: September 8, 2021               Content Version: 13.2  © 2006-2022 [employer]. Care instructions adapted under license by [employer] (which disclaims liability or warranty for this information). If you have questions about a medical condition or this instruction, always ask your healthcare professional. [place] [address] any warranty or liability for your use of this information.

## 2022-03-23 ENCOUNTER — TELEPHONE (OUTPATIENT)
Dept: FAMILY MEDICINE CLINIC | Age: 57
End: 2022-03-23

## 2022-03-28 LAB — VIT B1 BLD-SCNC: 179.2 NMOL/L (ref 66.5–200)

## 2022-04-25 RX ORDER — SERTRALINE HYDROCHLORIDE 100 MG/1
150 TABLET, FILM COATED ORAL DAILY
Qty: 135 TABLET | Refills: 0 | Status: SHIPPED | OUTPATIENT
Start: 2022-04-25 | End: 2022-08-21 | Stop reason: SDUPTHER

## 2022-04-25 RX ORDER — LOSARTAN POTASSIUM 100 MG/1
100 TABLET ORAL DAILY
Qty: 90 TABLET | Refills: 0 | Status: SHIPPED | OUTPATIENT
Start: 2022-04-25 | End: 2022-07-23 | Stop reason: SDUPTHER

## 2022-05-02 RX ORDER — ERGOCALCIFEROL 1.25 MG/1
50000 CAPSULE ORAL
Qty: 24 CAPSULE | Refills: 0 | Status: SHIPPED | OUTPATIENT
Start: 2022-05-05 | End: 2022-07-23 | Stop reason: SDUPTHER

## 2022-05-27 DIAGNOSIS — J45.20 MILD INTERMITTENT ASTHMA WITHOUT COMPLICATION: ICD-10-CM

## 2022-05-27 DIAGNOSIS — I10 ESSENTIAL HYPERTENSION: ICD-10-CM

## 2022-05-31 RX ORDER — FLUTICASONE PROPIONATE AND SALMETEROL XINAFOATE 45; 21 UG/1; UG/1
2 AEROSOL, METERED RESPIRATORY (INHALATION) 2 TIMES DAILY
Qty: 3 EACH | Refills: 1 | Status: SHIPPED | OUTPATIENT
Start: 2022-05-31

## 2022-05-31 RX ORDER — FLUTICASONE PROPIONATE 50 MCG
2 SPRAY, SUSPENSION (ML) NASAL DAILY
Qty: 3 EACH | Refills: 1 | Status: SHIPPED | OUTPATIENT
Start: 2022-05-31

## 2022-05-31 RX ORDER — MONTELUKAST SODIUM 10 MG/1
10 TABLET ORAL DAILY
Qty: 90 TABLET | Refills: 1 | Status: SHIPPED | OUTPATIENT
Start: 2022-05-31 | End: 2022-08-21 | Stop reason: SDUPTHER

## 2022-05-31 RX ORDER — AMLODIPINE BESYLATE 5 MG/1
5 TABLET ORAL DAILY
Qty: 90 TABLET | Refills: 0 | Status: SHIPPED | OUTPATIENT
Start: 2022-05-31 | End: 2022-08-21 | Stop reason: SDUPTHER

## 2022-05-31 RX ORDER — EZETIMIBE 10 MG/1
10 TABLET ORAL DAILY
Qty: 90 TABLET | Refills: 3 | Status: SHIPPED | OUTPATIENT
Start: 2022-05-31 | End: 2022-08-21 | Stop reason: SDUPTHER

## 2022-06-20 RX ORDER — OMEPRAZOLE 20 MG/1
CAPSULE, DELAYED RELEASE ORAL
Qty: 90 CAPSULE | Refills: 3 | Status: SHIPPED | OUTPATIENT
Start: 2022-06-20 | End: 2022-07-23 | Stop reason: SDUPTHER

## 2022-07-04 DIAGNOSIS — E11.9 CONTROLLED TYPE 2 DIABETES MELLITUS WITHOUT COMPLICATION, WITHOUT LONG-TERM CURRENT USE OF INSULIN (HCC): ICD-10-CM

## 2022-07-04 DIAGNOSIS — E11.9 TYPE 2 DIABETES MELLITUS WITHOUT COMPLICATION, WITHOUT LONG-TERM CURRENT USE OF INSULIN (HCC): ICD-10-CM

## 2022-07-07 RX ORDER — LANCETS
EACH MISCELLANEOUS
Qty: 100 EACH | Refills: 11 | Status: SHIPPED | OUTPATIENT
Start: 2022-07-07 | End: 2022-11-02 | Stop reason: SDUPTHER

## 2022-07-07 RX ORDER — METFORMIN HYDROCHLORIDE 500 MG/1
2000 TABLET, EXTENDED RELEASE ORAL
Qty: 360 TABLET | Refills: 1 | Status: SHIPPED | OUTPATIENT
Start: 2022-07-07

## 2022-07-25 RX ORDER — ERGOCALCIFEROL 1.25 MG/1
50000 CAPSULE ORAL
Qty: 24 CAPSULE | Refills: 0 | Status: SHIPPED | OUTPATIENT
Start: 2022-07-28 | End: 2022-11-02 | Stop reason: SDUPTHER

## 2022-07-25 RX ORDER — LOSARTAN POTASSIUM 100 MG/1
100 TABLET ORAL DAILY
Qty: 90 TABLET | Refills: 0 | Status: SHIPPED | OUTPATIENT
Start: 2022-07-25 | End: 2022-10-22 | Stop reason: SDUPTHER

## 2022-07-25 RX ORDER — OMEPRAZOLE 20 MG/1
20 CAPSULE, DELAYED RELEASE ORAL DAILY
Qty: 90 CAPSULE | Refills: 3 | Status: SHIPPED | OUTPATIENT
Start: 2022-07-25

## 2022-07-27 ENCOUNTER — HOSPITAL ENCOUNTER (OUTPATIENT)
Dept: LAB | Age: 57
Discharge: HOME | End: 2022-07-27

## 2022-07-27 LAB — XX-LABCORP SPECIMEN COL,LCBCF: NORMAL

## 2022-07-27 PROCEDURE — 99001 SPECIMEN HANDLING PT-LAB: CPT

## 2022-07-28 LAB
ALBUMIN SERPL-MCNC: 4.1 G/DL (ref 3.8–4.9)
ALBUMIN/CREAT UR: 7 MG/G CREAT (ref 0–29)
ALBUMIN/GLOB SERPL: 2.2 {RATIO} (ref 1.2–2.2)
ALP SERPL-CCNC: 114 IU/L (ref 44–121)
ALT SERPL-CCNC: 31 IU/L (ref 0–32)
AST SERPL-CCNC: 18 IU/L (ref 0–40)
BILIRUB SERPL-MCNC: 0.5 MG/DL (ref 0–1.2)
BUN SERPL-MCNC: 17 MG/DL (ref 6–24)
BUN/CREAT SERPL: 29 (ref 9–23)
CALCIUM SERPL-MCNC: 9 MG/DL (ref 8.7–10.2)
CHLORIDE SERPL-SCNC: 106 MMOL/L (ref 96–106)
CHOLEST SERPL-MCNC: 194 MG/DL (ref 100–199)
CO2 SERPL-SCNC: 22 MMOL/L (ref 20–29)
CREAT SERPL-MCNC: 0.59 MG/DL (ref 0.57–1)
CREAT UR-MCNC: 155 MG/DL
EGFR: 106 ML/MIN/1.73
EST. AVERAGE GLUCOSE BLD GHB EST-MCNC: 137 MG/DL
GLOBULIN SER CALC-MCNC: 1.9 G/DL (ref 1.5–4.5)
GLUCOSE SERPL-MCNC: 98 MG/DL (ref 65–99)
HBA1C MFR BLD: 6.4 % (ref 4.8–5.6)
HDLC SERPL-MCNC: 47 MG/DL
IMP & REVIEW OF LAB RESULTS: NORMAL
LDLC SERPL CALC-MCNC: 123 MG/DL (ref 0–99)
MICROALBUMIN UR-MCNC: 10.6 UG/ML
POTASSIUM SERPL-SCNC: 4 MMOL/L (ref 3.5–5.2)
PROT SERPL-MCNC: 6 G/DL (ref 6–8.5)
SODIUM SERPL-SCNC: 145 MMOL/L (ref 134–144)
TRIGL SERPL-MCNC: 132 MG/DL (ref 0–149)
VLDLC SERPL CALC-MCNC: 24 MG/DL (ref 5–40)

## 2022-08-09 ENCOUNTER — OFFICE VISIT (OUTPATIENT)
Dept: FAMILY MEDICINE CLINIC | Age: 57
End: 2022-08-09
Payer: COMMERCIAL

## 2022-08-09 VITALS
HEIGHT: 68 IN | DIASTOLIC BLOOD PRESSURE: 82 MMHG | WEIGHT: 275 LBS | BODY MASS INDEX: 41.68 KG/M2 | RESPIRATION RATE: 16 BRPM | TEMPERATURE: 97 F | HEART RATE: 74 BPM | OXYGEN SATURATION: 98 % | SYSTOLIC BLOOD PRESSURE: 126 MMHG

## 2022-08-09 DIAGNOSIS — J30.2 SEASONAL ALLERGIC RHINITIS, UNSPECIFIED TRIGGER: ICD-10-CM

## 2022-08-09 DIAGNOSIS — E66.01 OBESITY, MORBID (HCC): ICD-10-CM

## 2022-08-09 DIAGNOSIS — Z12.11 COLON CANCER SCREENING: ICD-10-CM

## 2022-08-09 DIAGNOSIS — I10 PRIMARY HYPERTENSION: ICD-10-CM

## 2022-08-09 DIAGNOSIS — E78.1 HYPERTRIGLYCERIDEMIA: ICD-10-CM

## 2022-08-09 DIAGNOSIS — E55.9 VITAMIN D DEFICIENCY: ICD-10-CM

## 2022-08-09 DIAGNOSIS — E11.9 CONTROLLED TYPE 2 DIABETES MELLITUS WITHOUT COMPLICATION, WITHOUT LONG-TERM CURRENT USE OF INSULIN (HCC): Primary | ICD-10-CM

## 2022-08-09 DIAGNOSIS — E78.00 PURE HYPERCHOLESTEROLEMIA: ICD-10-CM

## 2022-08-09 DIAGNOSIS — Z98.84 S/P GASTRIC BYPASS: ICD-10-CM

## 2022-08-09 DIAGNOSIS — M13.0 POLYARTHRITIS: ICD-10-CM

## 2022-08-09 DIAGNOSIS — F33.9 RECURRENT DEPRESSION (HCC): ICD-10-CM

## 2022-08-09 DIAGNOSIS — J45.20 MILD INTERMITTENT ASTHMA WITHOUT COMPLICATION: ICD-10-CM

## 2022-08-09 DIAGNOSIS — F41.9 ANXIETY: ICD-10-CM

## 2022-08-09 PROCEDURE — 3044F HG A1C LEVEL LT 7.0%: CPT | Performed by: FAMILY MEDICINE

## 2022-08-09 PROCEDURE — 99214 OFFICE O/P EST MOD 30 MIN: CPT | Performed by: FAMILY MEDICINE

## 2022-08-09 RX ORDER — CLOTRIMAZOLE AND BETAMETHASONE DIPROPIONATE 10; .64 MG/G; MG/G
CREAM TOPICAL 2 TIMES DAILY
Qty: 15 G | Refills: 0 | Status: SHIPPED | OUTPATIENT
Start: 2022-08-09

## 2022-08-09 NOTE — PROGRESS NOTES
1. Have you been to the ER, urgent care clinic since your last visit? Hospitalized since your last visit? No    2. Have you seen or consulted any other health care providers outside of the [address] [place] [address] since your last visit? Include any pap smears or colon screening.  No

## 2022-08-21 DIAGNOSIS — J45.20 MILD INTERMITTENT ASTHMA WITHOUT COMPLICATION: ICD-10-CM

## 2022-08-21 DIAGNOSIS — I10 ESSENTIAL HYPERTENSION: ICD-10-CM

## 2022-08-21 DIAGNOSIS — F41.9 ANXIETY: ICD-10-CM

## 2022-08-22 RX ORDER — EZETIMIBE 10 MG/1
10 TABLET ORAL DAILY
Qty: 90 TABLET | Refills: 3 | Status: SHIPPED | OUTPATIENT
Start: 2022-08-22

## 2022-08-22 RX ORDER — AMLODIPINE BESYLATE 5 MG/1
5 TABLET ORAL DAILY
Qty: 90 TABLET | Refills: 0 | Status: SHIPPED | OUTPATIENT
Start: 2022-08-22

## 2022-08-22 RX ORDER — ALPRAZOLAM 0.5 MG/1
0.5 TABLET ORAL
Qty: 12 TABLET | Refills: 0 | Status: SHIPPED | OUTPATIENT
Start: 2022-08-22

## 2022-08-22 RX ORDER — SERTRALINE HYDROCHLORIDE 100 MG/1
150 TABLET, FILM COATED ORAL DAILY
Qty: 135 TABLET | Refills: 0 | Status: SHIPPED | OUTPATIENT
Start: 2022-08-22

## 2022-08-22 RX ORDER — MONTELUKAST SODIUM 10 MG/1
10 TABLET ORAL DAILY
Qty: 90 TABLET | Refills: 1 | Status: SHIPPED | OUTPATIENT
Start: 2022-08-22

## 2022-08-22 NOTE — TELEPHONE ENCOUNTER
Last OV: 8/9/2022  Last labs: 7/27/2022  Next OV and labs: due 11/2022    [facility] message sent to patient advising she is due for follow upm in Nov.

## 2022-10-24 RX ORDER — LOSARTAN POTASSIUM 100 MG/1
100 TABLET ORAL DAILY
Qty: 90 TABLET | Refills: 0 | Status: SHIPPED | OUTPATIENT
Start: 2022-10-24

## 2022-10-26 RX ORDER — ALBUTEROL SULFATE 90 UG/1
1-2 AEROSOL, METERED RESPIRATORY (INHALATION)
Qty: 3 EACH | Refills: 0 | Status: SHIPPED | OUTPATIENT
Start: 2022-10-26

## 2022-11-02 DIAGNOSIS — E11.9 CONTROLLED TYPE 2 DIABETES MELLITUS WITHOUT COMPLICATION, WITHOUT LONG-TERM CURRENT USE OF INSULIN (HCC): ICD-10-CM

## 2022-11-02 DIAGNOSIS — E11.9 TYPE 2 DIABETES MELLITUS WITHOUT COMPLICATION, WITHOUT LONG-TERM CURRENT USE OF INSULIN (HCC): ICD-10-CM

## 2022-11-03 RX ORDER — LANCETS
EACH MISCELLANEOUS
Qty: 100 EACH | Refills: 11 | Status: SHIPPED | OUTPATIENT
Start: 2022-11-03

## 2022-11-03 RX ORDER — ICOSAPENT ETHYL 1000 MG/1
2 CAPSULE ORAL 2 TIMES DAILY WITH MEALS
Qty: 360 CAPSULE | Refills: 0 | Status: SHIPPED | OUTPATIENT
Start: 2022-11-03

## 2022-11-03 RX ORDER — ERGOCALCIFEROL 1.25 MG/1
50000 CAPSULE ORAL
Qty: 24 CAPSULE | Refills: 0 | Status: SHIPPED | OUTPATIENT
Start: 2022-11-03

## 2022-11-14 ENCOUNTER — HOSPITAL ENCOUNTER (OUTPATIENT)
Dept: LAB | Age: 57
Discharge: HOME | End: 2022-11-14

## 2022-11-14 PROCEDURE — 99001 SPECIMEN HANDLING PT-LAB: CPT

## 2022-11-17 ENCOUNTER — OFFICE VISIT (OUTPATIENT)
Dept: FAMILY MEDICINE CLINIC | Age: 57
End: 2022-11-17
Payer: COMMERCIAL

## 2022-11-17 VITALS
DIASTOLIC BLOOD PRESSURE: 74 MMHG | BODY MASS INDEX: 42.32 KG/M2 | OXYGEN SATURATION: 99 % | SYSTOLIC BLOOD PRESSURE: 130 MMHG | RESPIRATION RATE: 18 BRPM | WEIGHT: 279.25 LBS | HEIGHT: 68 IN | HEART RATE: 76 BPM | TEMPERATURE: 97.8 F

## 2022-11-17 DIAGNOSIS — E78.1 HYPERTRIGLYCERIDEMIA: ICD-10-CM

## 2022-11-17 DIAGNOSIS — E55.9 VITAMIN D DEFICIENCY: ICD-10-CM

## 2022-11-17 DIAGNOSIS — E78.00 PURE HYPERCHOLESTEROLEMIA: ICD-10-CM

## 2022-11-17 DIAGNOSIS — Z98.84 S/P GASTRIC BYPASS: ICD-10-CM

## 2022-11-17 DIAGNOSIS — Z12.31 BREAST CANCER SCREENING BY MAMMOGRAM: ICD-10-CM

## 2022-11-17 DIAGNOSIS — I10 ESSENTIAL HYPERTENSION: ICD-10-CM

## 2022-11-17 DIAGNOSIS — E11.9 CONTROLLED TYPE 2 DIABETES MELLITUS WITHOUT COMPLICATION, WITHOUT LONG-TERM CURRENT USE OF INSULIN (HCC): Primary | ICD-10-CM

## 2022-11-17 DIAGNOSIS — F41.9 ANXIETY: ICD-10-CM

## 2022-11-17 DIAGNOSIS — J45.20 MILD INTERMITTENT ASTHMA WITHOUT COMPLICATION: ICD-10-CM

## 2022-11-17 DIAGNOSIS — K90.9 INTESTINAL MALABSORPTION, UNSPECIFIED TYPE: ICD-10-CM

## 2022-11-17 DIAGNOSIS — F33.9 RECURRENT DEPRESSION (HCC): ICD-10-CM

## 2022-11-17 DIAGNOSIS — E66.01 OBESITY, MORBID (HCC): ICD-10-CM

## 2022-11-17 PROCEDURE — 3074F SYST BP LT 130 MM HG: CPT | Performed by: FAMILY MEDICINE

## 2022-11-17 PROCEDURE — 3078F DIAST BP <80 MM HG: CPT | Performed by: FAMILY MEDICINE

## 2022-11-17 PROCEDURE — 99214 OFFICE O/P EST MOD 30 MIN: CPT | Performed by: FAMILY MEDICINE

## 2022-11-17 PROCEDURE — 3044F HG A1C LEVEL LT 7.0%: CPT | Performed by: FAMILY MEDICINE

## 2022-11-17 NOTE — PROGRESS NOTES
Chief Complaint   Patient presents with    Anxiety    Asthma    Cholesterol Problem    Depression    Diabetes    GERD    Hypertension    Results     Review of results     Vitamin D Deficiency      Visit Vitals  /74 (BP 1 Location: Left upper arm, BP Patient Position: Sitting, BP Cuff Size: Adult)   Pulse 76   Temp 97.8 °F (36.6 °C) (Temporal)   Resp 18   Ht 5' 7.99\" (1.727 m)   Wt 279 lb 4 oz (126.7 kg)   LMP 11/05/2016   SpO2 99%   BMI 42.47 kg/m²      3 most recent PHQ Screens 11/17/2022   PHQ Not Done -   Little interest or pleasure in doing things Nearly every day   Feeling down, depressed, irritable, or hopeless Nearly every day   Total Score PHQ 2 6   Trouble falling or staying asleep, or sleeping too much More than half the days   Feeling tired or having little energy Nearly every day   Poor appetite, weight loss, or overeating Nearly every day   Feeling bad about yourself - or that you are a failure or have let yourself or your family down Nearly every day   Trouble concentrating on things such as school, work, reading, or watching TV Not at all   Moving or speaking so slowly that other people could have noticed; or the opposite being so fidgety that others notice Several days   Thoughts of being better off dead, or hurting yourself in some way Not at all   PHQ 9 Score 18   How difficult have these problems made it for you to do your work, take care of your home and get along with others Somewhat difficult     Abuse Screening Questionnaire 11/17/2022   Do you ever feel afraid of your partner? N   Are you in a relationship with someone who physically or mentally threatens you? N   Is it safe for you to go home?  Y     Learning Assessment 11/17/2022   PRIMARY LEARNER Patient   HIGHEST LEVEL OF EDUCATION - PRIMARY LEARNER  > 4 YEARS OF COLLEGE   BARRIERS PRIMARY LEARNER OTHER     READING   CO-LEARNER CAREGIVER -   PRIMARY LANGUAGE ENGLISH   LEARNER PREFERENCE PRIMARY DEMONSTRATION     VIDEOS   ANSWERED BY Shirley Starr   RELATIONSHIP SELF      1. \"Have you been to the ER, urgent care clinic since your last visit? Hospitalized since your last visit? \" No    2. \"Have you seen or consulted any other health care providers outside of the [address] [place] [address] since your last visit? \" No     3. For patients aged 39-70: Has the patient had a colonoscopy / FIT/ Cologuard? Yes - no Care Gap present      If the patient is female:    4. For patients aged 41-77: Has the patient had a mammogram within the past 2 years? Yes - no Care Gap present      5. For patients aged 21-65: Has the patient had a pap smear?  Yes - no Care Gap present

## 2022-11-17 NOTE — PROGRESS NOTES
1. \"Have you been to the ER, urgent care clinic since your last visit? Hospitalized since your last visit? \" {Yes when where and reason for visit:20441}    2. \"Have you seen or consulted any other health care providers outside of the [address] [place] [address] since your last visit? \" {Yes when where and reason for visit:20441}     3. For patients aged 39-70: Has the patient had a colonoscopy / FIT/ Cologuard? Yes - no Care Gap present      If the patient is female:    4. For patients aged 41-77: Has the patient had a mammogram within the past 2 years? Yes - no Care Gap present      5. For patients aged 21-65: Has the patient had a pap smear?  Yes - no Care Gap present

## 2022-11-25 NOTE — PROGRESS NOTES
Joseph Redding, 65 y.o.,  female    SUBJECTIVE  Ff-up    DM-  She is currently taking metformin, farxiga and trulicity, following endocrinology. HL- currently on zetia and vascepa been intolerant to statins including latest trial crestor. Reports father/brother CAD/CABG. False positive stress test 4/2022, cardiac cath 5/2022 normal coronaries. Depression/anxiety-she reports about the same, she takes zoloft 100-150 mg depending on stressors. And very occasional use of  taking ativan. 12 pills x 6 months    HTN- taking norvasc 5 mg, and losartan 100 mg. GERD- doing well on prilosec. Asthma- is doing well, no recent exacerbation, She is on advair. On singulair and zyrtec as well for allergies. Vit d def- she is s/p gastric bypass, says taking 50,000 iu vit d 2 x a week. Per pt utd with gyne care with dr. nayla ROBINS:  See HPI, all others negative        Patient Active Problem List   Diagnosis Code    HTN (hypertension) I10    Hypertriglyceridemia E78.1    Allergic rhinitis J30.9    Depression F32. A    S/P gastric bypass Z98.84    Anxiety F41.9    Iron deficiency E61.1    Vitamin D deficiency E55.9    Irregular menses N92.6    Vertigo R42    Onychomycosis B35.1    Impaired glucose tolerance R73.02    Rosacea L71.9    Gastroesophageal reflux disease without esophagitis K21.9    Asthma J45.909    Obesity, morbid (HCC) E66.01    Recurrent depression ([facility]) F33.9    Pure hypercholesterolemia E78.00    Controlled type 2 diabetes mellitus without complication, without long-term current use of insulin (HCC) E11.9       Current Outpatient Medications   Medication Sig Dispense Refill    icosapent ethyL (VASCEPA) 1 gram capsule Take 2 Capsules by mouth two (2) times daily (with meals).  360 Capsule 0    glucose blood VI test strips strip Check glucose level once daily 100 Strip 3    lancets misc Use as directed once daily 100 Each 11    ergocalciferol (ERGOCALCIFEROL) 1,250 mcg (50,000 unit) capsule Take 1 Capsule by mouth two (2) days a week. 24 Capsule 0    dapagliflozin (FARXIGA) 10 mg tab tablet Take 1 Tablet by mouth daily. 90 Tablet 0    albuterol (PROVENTIL HFA, VENTOLIN HFA, PROAIR HFA) 90 mcg/actuation inhaler Take 1-2 Puffs by inhalation every four (4) hours as needed for Wheezing or Shortness of Breath. 3 Each 0    losartan (COZAAR) 100 mg tablet Take 1 Tablet by mouth daily. 90 Tablet 0    ALPRAZolam (XANAX) 0.5 mg tablet Take 1 Tablet by mouth three (3) times daily as needed for Anxiety. Max Daily Amount: 1.5 mg. 12 Tablet 0    sertraline (Zoloft) 100 mg tablet Take 1.5 Tablets by mouth daily. 135 Tablet 0    ezetimibe (ZETIA) 10 mg tablet Take 1 Tablet by mouth daily. 90 Tablet 3    montelukast (Singulair) 10 mg tablet Take 1 Tablet by mouth daily. 90 Tablet 1    amLODIPine (Norvasc) 5 mg tablet Take 1 Tablet by mouth daily. 90 Tablet 0    omeprazole (PRILOSEC) 20 mg capsule Take 1 Capsule by mouth in the morning. 90 Capsule 3    metFORMIN ER (GLUCOPHAGE XR) 500 mg tablet Take 4 Tablets by mouth daily (with dinner). 360 Tablet 1    fluticasone-Salmeterol (Advair HFA) 45-21 mcg/actuation inhaler Take 2 Puffs by inhalation two (2) times a day. 3 Each 1    fluticasone propionate (Flonase) 50 mcg/actuation nasal spray 2 Sprays by Both Nostrils route daily. 3 Each 1    Trulicity 1.5 BE/0.3 mL sub-q pen       cholecalciferol, vitamin D3, (VITAMIN D3 PO) Take  by mouth. ZINC PO Take  by mouth. ferrous sulfate (IRON PO) Take  by mouth. POTASSIUM-CALCIUM-MAGNESIUM PO Take  by mouth. B.infantis-B.ani-B.long-B.bifi 10-15 mg TbEC Take  by mouth. CETIRIZINE HCL (ZYRTEC PO) Take  by mouth daily. CYANOCOBALAMIN (VITAMIN B-12 SL) by SubLINGual route.          Allergies   Allergen Reactions    Latex, Natural Rubber Other (comments)    Simvastatin Myalgia    Entex [Phenylephrine-Guaifenesin] Unknown (comments)       Past Medical History:   Diagnosis Date    Asthma     Depression     Anxiety GERD (gastroesophageal reflux disease)     Hypercholesterolemia     Hypertension        Social History     Socioeconomic History    Marital status:      Spouse name: Not on file    Number of children: Not on file    Years of education: Not on file    Highest education level: Not on file   Occupational History    Not on file   Tobacco Use    Smoking status: Never    Smokeless tobacco: Never   Substance and Sexual Activity    Alcohol use: Yes     Comment: 2 drinks per month    Drug use: No    Sexual activity: Not Currently     Partners: Male   Other Topics Concern    Not on file   Social History Narrative    Not on file     Social Determinants of Health     Financial Resource Strain: Low Risk     Difficulty of Paying Living Expenses: Not very hard   Food Insecurity: No Food Insecurity    Worried About Running Out of Food in the Last Year: Never true    Ran Out of Food in the Last Year: Never true   Transportation Needs: Not on file   Physical Activity: Not on file   Stress: Not on file   Social Connections: Not on file   Intimate Partner Violence: Not on file   Housing Stability: Not on file       Family History   Problem Relation Age of Onset    Asthma Mother     High Cholesterol Mother     Hypertension Mother     Thyroid Disease Mother     Cancer Mother 79        Thyroid Cancer    Depression Father     High Cholesterol Father     Hypertension Father     Colon Polyps Father     Depression Brother     High Cholesterol Brother     Hypertension Brother     Breast Cancer Maternal Grandmother     High Cholesterol Maternal Grandmother     Hypertension Maternal Grandmother     Diabetes Maternal Grandmother     Cancer Maternal Grandfather         prostate    High Cholesterol Maternal Grandfather     Hypertension Maternal Grandfather     Diabetes Maternal Grandfather     High Cholesterol Paternal Grandmother     Hypertension Paternal Grandmother     Diabetes Paternal Grandmother     Thyroid Disease Paternal Grandmother High Cholesterol Paternal Grandfather     Hypertension Paternal Grandfather     Diabetes Paternal Grandfather          OBJECTIVE    Physical Exam:     Visit Vitals  /74 (BP 1 Location: Left upper arm, BP Patient Position: Sitting, BP Cuff Size: Adult)   Pulse 76   Temp 97.8 °F (36.6 °C) (Temporal)   Resp 18   Ht 5' 7.99\" (1.727 m)   Wt 279 lb 4 oz (126.7 kg)   LMP 11/05/2016   SpO2 99%   BMI 42.47 kg/m²       General: alert, obese,  in no apparent distress or pain  CVS: normal rate, regular rhythm, distinct S1 and S2  Lungs:clear to ausculation bilaterally, no crackles, wheezing or rhonchi noted  Psych:  mood and affect normal    Results for orders placed or performed in visit on 08/09/22   OCCULT BLOOD IMMUNOASSAY,DIAGNOSTIC   Result Value Ref Range    Occult blood fecal, by IA Negative Negative   CBC WITH AUTOMATED DIFF   Result Value Ref Range    WBC 7.4 3.4 - 10.8 x10E3/uL    RBC 4.89 3.77 - 5.28 x10E6/uL    HGB 14.8 11.1 - 15.9 g/dL    HCT 44.7 34.0 - 46.6 %    MCV 91 79 - 97 fL    MCH 30.3 26.6 - 33.0 pg    MCHC 33.1 31.5 - 35.7 g/dL    RDW 12.5 11.7 - 15.4 %    PLATELET 759 457 - 374 x10E3/uL    NEUTROPHILS 71 Not Estab. %    Lymphocytes 19 Not Estab. %    MONOCYTES 6 Not Estab. %    EOSINOPHILS 2 Not Estab. %    BASOPHILS 1 Not Estab. %    ABS. NEUTROPHILS 5.3 1.4 - 7.0 x10E3/uL    Abs Lymphocytes 1.4 0.7 - 3.1 x10E3/uL    ABS. MONOCYTES 0.5 0.1 - 0.9 x10E3/uL    ABS. EOSINOPHILS 0.2 0.0 - 0.4 x10E3/uL    ABS. BASOPHILS 0.0 0.0 - 0.2 x10E3/uL    IMMATURE GRANULOCYTES 1 Not Estab. %    ABS. IMM.  GRANS. 0.1 0.0 - 0.1 P26H1/NT   METABOLIC PANEL, COMPREHENSIVE   Result Value Ref Range    Glucose 116 (H) 70 - 99 mg/dL    BUN 17 6 - 24 mg/dL    Creatinine 0.74 0.57 - 1.00 mg/dL    eGFR 95 >59 mL/min/1.73    BUN/Creatinine ratio 23 9 - 23    Sodium 143 134 - 144 mmol/L    Potassium 4.5 3.5 - 5.2 mmol/L    Chloride 105 96 - 106 mmol/L    CO2 21 20 - 29 mmol/L    Calcium 9.4 8.7 - 10.2 mg/dL    Protein, total 6.2 6.0 - 8.5 g/dL    Albumin 4.2 3.8 - 4.9 g/dL    GLOBULIN, TOTAL 2.0 1.5 - 4.5 g/dL    A-G Ratio 2.1 1.2 - 2.2    Bilirubin, total 0.4 0.0 - 1.2 mg/dL    Alk. phosphatase 121 44 - 121 IU/L    AST (SGOT) 31 0 - 40 IU/L    ALT (SGPT) 45 (H) 0 - 32 IU/L   KEO COMPREHENSIVE PANEL   Result Value Ref Range    Anti-DNA (DS) Ab, QT 1 0 - 9 IU/mL    RNP Abs 0.5 0.0 - 0.9 AI    Michelle Abs <0.2 0.0 - 0.9 AI    Scleroderma-70 Ab <0.2 0.0 - 0.9 AI    Sjogren's Anti-SS-A <0.2 0.0 - 0.9 AI    Sjogren's Anti-SS-B <0.2 0.0 - 0.9 AI    Antichromatin Ab <0.2 0.0 - 0.9 AI    Anti-Diana-1 <0.2 0.0 - 0.9 AI    Centromere B Ab <0.2 0.0 - 0.9 AI    See below Comment    LIPID PANEL   Result Value Ref Range    Cholesterol, total 215 (H) 100 - 199 mg/dL    Triglyceride 173 (H) 0 - 149 mg/dL    HDL Cholesterol 48 >39 mg/dL    VLDL, calculated 31 5 - 40 mg/dL    LDL, calculated 136 (H) 0 - 99 mg/dL   CVD REPORT   Result Value Ref Range    INTERPRETATION Note    HEMOGLOBIN A1C WITH EAG   Result Value Ref Range    Hemoglobin A1c 6.3 (H) 4.8 - 5.6 %    Estimated average glucose 134 mg/dL   RHEUMATOID FACTOR, QL   Result Value Ref Range    Rheumatoid factor <10.0 <14.0 IU/mL   SED RATE (ESR)   Result Value Ref Range    Sed rate (ESR) 12 0 - 40 mm/hr           ASSESSMENT/PLAN  Shirley was seen today for other, hypertension, anxiety and depression. Diagnoses and all orders for this visit:    Diabetes mellitus without complication, without long term current use of insulin  A1c 6.3  On metformin, trulicity, farxiga  Intolerant to statin  On ARB  ADA diet discussed  eye exam-- Dr. My Hernandez cmp, a1c, lipid panel,prior to next visit  Following dr. Hernandez Ruelas  -     [facility]; Future  -     METABOLIC PANEL, COMPREHENSIVE;  Future    Primary hypertension-  controlled  Cont norvasc   Cont losartan   monitoring    Hyperlipidemia   goal LDL< 70  Cont zetia, vascepa  intolerant to statin, but with fam hx    S/P gastric bypass    Vitamin D Deficiency   s/p gastric bypass  Cont   vitamin d to 50k twice a week  Monitoring    Recurrent Depression  Fair control  Cont zoloft to 100-150 mg zoloft mg,   On prn ativan 12 pills x 6 months,advised to wean off  Monitoring    Anxiety    Allergic rhinitis, unspecified allergic rhinitis type  Cont singulair/zyrtec    Gastroesophageal reflux disease without esophagitis-  Stable, Cont PPI    Asthma, mild intermittent, uncomplicated  Controlled, cont advair, prn albuterol    Morbid Obesity HCC  Commended on wt loss     Hypertriglyceridemia     Obesity, Class III, BMI 40-49.9 (morbid obesity) (HCC)    Intestinal malabsorption, unspecified type  -     IRON PROFILE; Future  -     FERRITIN; Future  -     VITAMIN B12 & FOLATE; Future  -     VITAMIN B1, WHOLE BLOOD; Future  -     VITAMIN D, 25 HYDROXY; Future  -     CBC WITH AUTOMATED DIFF; Future     Breast cancer screening by mammogram  -     [facility] MAMMO BI SCREENING INCL CAD; Future          Follow-up and Dispositions    Return in about 3 months (around 2/17/2023), or if symptoms worsen or fail to improve, for routine chronic illness care. Patient understands plan of care. Patient has provided input and agrees with goals.

## 2022-11-28 RX ORDER — AMLODIPINE BESYLATE 5 MG/1
TABLET ORAL
Qty: 90 TABLET | Refills: 0 | Status: SHIPPED | OUTPATIENT
Start: 2022-11-28

## 2023-02-08 ENCOUNTER — OFFICE VISIT (OUTPATIENT)
Dept: FAMILY MEDICINE CLINIC | Age: 58
End: 2023-02-08
Payer: COMMERCIAL

## 2023-02-08 VITALS
TEMPERATURE: 97 F | RESPIRATION RATE: 16 BRPM | SYSTOLIC BLOOD PRESSURE: 132 MMHG | BODY MASS INDEX: 44.57 KG/M2 | WEIGHT: 284 LBS | DIASTOLIC BLOOD PRESSURE: 78 MMHG | OXYGEN SATURATION: 97 % | HEART RATE: 68 BPM | HEIGHT: 67 IN
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
## 2023-02-08 DIAGNOSIS — E66.01 OBESITY, CLASS III, BMI 40-49.9 (MORBID OBESITY) (HCC): ICD-10-CM

## 2023-02-08 DIAGNOSIS — E11.9 CONTROLLED TYPE 2 DIABETES MELLITUS WITHOUT COMPLICATION, WITHOUT LONG-TERM CURRENT USE OF INSULIN (HCC): ICD-10-CM

## 2023-02-08 DIAGNOSIS — M79.642 LEFT HAND PAIN: Primary | ICD-10-CM

## 2023-02-08 DIAGNOSIS — M25.532 LEFT WRIST PAIN: ICD-10-CM

## 2023-02-08 DIAGNOSIS — F33.9 RECURRENT DEPRESSION (HCC): ICD-10-CM

## 2023-02-08 RX ORDER — EVOLOCUMAB 140 MG/ML
INJECTION, SOLUTION SUBCUTANEOUS
COMMUNITY
Start: 2022-11-30

## 2023-02-08 NOTE — PROGRESS NOTES
Nahomi Hendrix, 65 y.o.,  female    SUBJECTIVE  L wrist pain x1 day    Yesterday accidentally fell in garage, landing on wrist. Says developed numbness on L 4th-5th digits, she has worn splint. 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 A    S/P gastric bypass Z98.84    Anxiety F41.9    Iron deficiency E61.1    Vitamin D deficiency E55.9    Irregular menses N92.6    Vertigo R42    Onychomycosis B35.1    Impaired glucose tolerance R73.02    Rosacea L71.9    Gastroesophageal reflux disease without esophagitis K21.9    Asthma J45.909    Obesity, morbid ([facility]) E66.01    Recurrent depression ([facility]) F33.9    Pure hypercholesterolemia E78.00    Controlled type 2 diabetes mellitus without complication, without long-term current use of insulin ([facility]) E11.9       Current Outpatient Medications   Medication Sig Dispense Refill    dapagliflozin (FARXIGA) 10 mg tab tablet Take 1 Tablet by mouth daily. 90 Tablet 0    fluticasone propionate (Flonase) 50 mcg/actuation nasal spray 2 Sprays by Both Nostrils route daily. 3 Each 1    losartan (COZAAR) 100 mg tablet Take 1 Tablet by mouth daily. 90 Tablet 1    glucose blood VI test strips strip Check glucose level once daily 100 Strip 3    lancets misc Use as directed once daily 100 Each 11    fluticasone-Salmeterol (Advair HFA) 45-21 mcg/actuation inhaler Take 2 Puffs by inhalation two (2) times a day. 3 Each 0    metFORMIN ER (GLUCOPHAGE XR) 500 mg tablet Take 4 Tablets by mouth daily (with dinner). 360 Tablet 0    sertraline (Zoloft) 100 mg tablet Take 1.5 Tablets by mouth daily.  135 Tablet 0    amLODIPine (NORVASC) 5 mg tablet TAKE ONE TABLET BY MOUTH EVERY DAY 90 Tablet 0    icosapent ethyL (VASCEPA) 1 gram capsule Take 2 Capsules by mouth two (2) times daily (with meals). 360 Capsule 0    ergocalciferol (ERGOCALCIFEROL) 1,250 mcg (50,000 unit) capsule Take 1 Capsule by mouth two (2) days a week. 24 Capsule 0    albuterol (PROVENTIL HFA, VENTOLIN HFA, PROAIR HFA) 90 mcg/actuation inhaler Take 1-2 Puffs by inhalation every four (4) hours as needed for Wheezing or Shortness of Breath. 3 Each 0    ALPRAZolam (XANAX) 0.5 mg tablet Take 1 Tablet by mouth three (3) times daily as needed for Anxiety. Max Daily Amount: 1.5 mg. 12 Tablet 0    ezetimibe (ZETIA) 10 mg tablet Take 1 Tablet by mouth daily. 90 Tablet 3    montelukast (Singulair) 10 mg tablet Take 1 Tablet by mouth daily. 90 Tablet 1    omeprazole (PRILOSEC) 20 mg capsule Take 1 Capsule by mouth in the morning. 90 Capsule 3    Trulicity 1.5 IM/5.3 mL sub-q pen       cholecalciferol, vitamin D3, (VITAMIN D3 PO) Take  by mouth. ZINC PO Take  by mouth. ferrous sulfate (IRON PO) Take  by mouth. POTASSIUM-CALCIUM-MAGNESIUM PO Take  by mouth. B.infantis-B.ani-B.long-B.bifi 10-15 mg TbEC Take  by mouth. CETIRIZINE HCL (ZYRTEC PO) Take  by mouth daily. CYANOCOBALAMIN (VITAMIN B-12 SL) by SubLINGual route.       Repatha SureClick pen injection          Allergies   Allergen Reactions    Latex, Natural Rubber Other (comments)    Simvastatin Myalgia    Entex [Phenylephrine-Guaifenesin] Unknown (comments)       Past Medical History:   Diagnosis Date    Asthma     Depression     Anxiety    GERD (gastroesophageal reflux disease)     Hypercholesterolemia     Hypertension        Social History     Socioeconomic History    Marital status:      Spouse name: Not on file    Number of children: Not on file    Years of education: Not on file    Highest education level: Not on file   Occupational History    Not on file   Tobacco Use    Smoking status: Never    Smokeless tobacco: Never   Substance and Sexual Activity    Alcohol use: Yes     Comment: 2 drinks per month    Drug use: No    Sexual activity: Not Currently     Partners: Male   Other Topics Concern    Not on file   Social History Narrative    Not on file     Social Determinants of Health     Financial Resource Strain: Low Risk     Difficulty of Paying Living Expenses: Not very hard   Food Insecurity: No Food Insecurity    Worried About Running Out of Food in the Last Year: Never true    Ran Out of Food in the Last Year: Never true   Transportation Needs: Not on file   Physical Activity: Not on file   Stress: Not on file   Social Connections: Not on file   Intimate Partner Violence: Not on file   Housing Stability: Not on file       Family History   Problem Relation Age of Onset    Asthma Mother     High Cholesterol Mother     Hypertension Mother     Thyroid Disease Mother     Cancer Mother 79        Thyroid Cancer    Depression Father     High Cholesterol Father     Hypertension Father     Colon Polyps Father     Depression Brother     High Cholesterol Brother     Hypertension Brother     Breast Cancer Maternal Grandmother     High Cholesterol Maternal Grandmother     Hypertension Maternal Grandmother     Diabetes Maternal Grandmother     Cancer Maternal Grandfather         prostate    High Cholesterol Maternal Grandfather     Hypertension Maternal Grandfather     Diabetes Maternal Grandfather     High Cholesterol Paternal Grandmother     Hypertension Paternal Grandmother     Diabetes Paternal Grandmother     Thyroid Disease Paternal Grandmother     High Cholesterol Paternal Grandfather     Hypertension Paternal Grandfather     Diabetes Paternal Grandfather          OBJECTIVE    Physical Exam:     Visit Vitals  /78 (BP 1 Location: Left upper arm, BP Patient Position: Sitting, BP Cuff Size: Adult)   Pulse 68   Temp 97 °F (36.1 °C) (Temporal)   Resp 16   Ht 5' 7\" (1.702 m)   Wt 284 lb (128.8 kg)   LMP 11/05/2016   SpO2 97%   BMI 44.48 kg/m²       General: alert, well-appearing, obese, in no apparent distress or pain  Extremities: L lateral wrist tenderness, hand FROM, no bruising. Good DP pulses  Skin: warm, no lesions, rashes noted  Psych:  mood and affect normal        ASSESSMENT/PLAN  Diagnoses and all orders for this visit:    1. 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 Recurrent depression ([facility]  Stable    3. Controlled type 2 diabetes mellitus without complication, without long-term current use of insulin ([facility]    4. Left wrist pain  -     XR WRIST LT AP/LAT/OBL MIN 3V; Future  -     XR HAND LT MIN 3 V; Future  -     REFERRAL TO ORTHOPEDICS    5. Fall, initial encounter  -     XR WRIST LT AP/LAT/OBL MIN 3V; Future  -     XR HAND LT MIN 3 V; Future  -     REFERRAL TO ORTHOPEDICS    6. Obesity, Class III, BMI 40-49.9 (morbid obesity) ([facility]      Follow-up and Dispositions    Return in about 6 weeks (around 3/22/2023), or if symptoms worsen or fail to improve. Pt requesting to move feb appt to march, just started repatha, labs prior    Patient understands plan of care. Patient has provided input and agrees with goals.

## 2023-02-08 NOTE — PROGRESS NOTES
1. Have you been to the ER, urgent care clinic since your last visit? Hospitalized since your last visit? No    2. Have you seen or consulted any other health care providers outside of the [address] [place] [address] since your last visit? Include any pap smears or colon screening.  No

## 2023-02-09 ENCOUNTER — HOSPITAL ENCOUNTER (OUTPATIENT)
Dept: GENERAL RADIOLOGY | Age: 58
Discharge: HOME | End: 2023-02-09
Payer: COMMERCIAL

## 2023-02-09 DIAGNOSIS — M25.532 LEFT WRIST PAIN: ICD-10-CM

## 2023-02-09 DIAGNOSIS — M79.642 LEFT HAND PAIN: ICD-10-CM
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
## 2023-02-09 PROCEDURE — 73130 X-RAY EXAM OF HAND: CPT

## 2023-02-09 PROCEDURE — 73110 X-RAY EXAM OF WRIST: CPT

## 2023-02-09 NOTE — PROGRESS NOTES
No fracture on xray, but there is evidence of possible ligament injury, offer MRI for further check, or can also wait until evaluated by ortho and see appropriate tests. Let me know what she wants.

## 2023-02-10 ENCOUNTER — TELEPHONE (OUTPATIENT)
Dept: ORTHOPEDIC SURGERY | Age: 58
End: 2023-02-10

## 2023-02-10 NOTE — PROGRESS NOTES
Patient identified with 2 identifiers (name and ).   Patient aware of No fracture on xray, but there is evidence of possible ligament injury,patient is scheduled with Dr. Serge Monique next week 02/15/2023

## 2023-02-10 NOTE — TELEPHONE ENCOUNTER
Referral received from PCP for left hand/wrist pain after a fall. Xrays were taken. Patient said she is having numbness from her pinky to her middle finger, especially on the base of her ring finger. She previously broke her left wrist in 2011. She is scheduled for 3/15 at [facility], but didn't know if she should come in the office sooner.  Please advise

## 2023-02-15 ENCOUNTER — OFFICE VISIT (OUTPATIENT)
Age: 58
End: 2023-02-15

## 2023-02-15 VITALS — TEMPERATURE: 97.7 F | BODY MASS INDEX: 44.42 KG/M2 | WEIGHT: 283 LBS | HEIGHT: 67 IN

## 2023-02-15 DIAGNOSIS — S54.02XA CONTUSION OF LEFT ULNAR NERVE, INITIAL ENCOUNTER: Primary | ICD-10-CM

## 2023-02-15 DIAGNOSIS — S60.212A CONTUSION OF LEFT WRIST, INITIAL ENCOUNTER: ICD-10-CM

## 2023-02-15 NOTE — PROGRESS NOTES
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  Worker's Compensation and legal considerations: none    Chief Complaint   Patient presents with    Hand Pain     Left hand pain     AMB PAIN ASSESSMENT 2/15/2023   Location of Pain Hand   Location Modifiers Left   Severity of Pain 0   Limiting Behavior No   Result of Injury Yes   Work-Related Injury No         HPI: Patient presents today due to complaints of left little and ring finger numbness after hitting the little finger side of her wrist.  She reports it has gotten better every day over the past several days. Date of onset: Early February 2023  Injury: Yes: Comment: Contusion  Prior Treatment:  No    ROS: Review of Systems - General ROS: negative except HPI    Past Medical History:   Diagnosis Date    Asthma     Depression     Anxiety    GERD (gastroesophageal reflux disease)     Hypercholesterolemia     Hypertension        Past Surgical History:   Procedure Laterality Date    CHOLECYSTECTOMY  2009    DILATION AND CURETTAGE OF UTERUS  8/2015    GASTRIC BYPASS SURGERY  2002    PELVIC LAPAROSCOPY  1995    TONSILLECTOMY AND ADENOIDECTOMY      at age 3        Current Outpatient Medications   Medication Sig Dispense Refill    Lancets MISC Use as directed once daily      ZINC PO Take by mouth      albuterol sulfate HFA (PROVENTIL;VENTOLIN;PROAIR) 108 (90 Base) MCG/ACT inhaler Inhale 1-2 puffs into the lungs every 4 hours as needed      ALPRAZolam (XANAX) 0.5 MG tablet Take 0.5 mg by mouth 3 times daily as needed.       amLODIPine (NORVASC) 5 MG tablet TAKE ONE TABLET BY MOUTH EVERY DAY      dapagliflozin (FARXIGA) 10 MG tablet Take 10 mg by mouth daily      dulaglutide (TRULICITY) 1.5 QE/4.2WZ SC injection ceived the following from [facility]: Outside name: Trulicity 1.5 JR/6.1 mL sub-q pen      ergocalciferol (ERGOCALCIFEROL) 1.25 MG (41345 UT) capsule Take 50,000 Units by mouth      ezetimibe (ZETIA) 10 MG tablet Take 10 mg by mouth daily fluticasone (FLONASE) 50 MCG/ACT nasal spray 2 sprays by Nasal route daily      fluticasone-salmeterol (ADVAIR HFA) 45-21 MCG/ACT inhaler Inhale 2 puffs into the lungs 2 times daily      Icosapent Ethyl (VASCEPA) 1 g CAPS capsule Take 2 capsules by mouth 2 times daily (with meals)      losartan (COZAAR) 100 MG tablet Take 100 mg by mouth daily      metFORMIN (GLUCOPHAGE-XR) 500 MG extended release tablet Take 2,000 mg by mouth Daily with supper      montelukast (SINGULAIR) 10 MG tablet Take 10 mg by mouth daily      omeprazole (PRILOSEC) 20 MG delayed release capsule Take 20 mg by mouth daily      sertraline (ZOLOFT) 100 MG tablet Take 150 mg by mouth daily       No current facility-administered medications for this visit. Allergies   Allergen Reactions    Latex Other (See Comments)    Simvastatin Myalgia    Phenylephrine-Guaifenesin      Other reaction(s): Unknown (comments)         Temp 97.7 °F (36.5 °C) (Temporal)   Ht 5' 7\" (1.702 m)   Wt 283 lb (128.4 kg)   BMI 44.32 kg/m²   Physical Exam       Imaging:     Xray Result (most recent):  XR HAND LEFT (MIN 3 VIEWS) 02/09/2023    Narrative  EXAM:  XR HAND LT MIN 3 V, XR WRIST LT AP/LAT/OBL MIN 3V    INDICATION:  Fall on hand with numbness of the fourth and fifth digits. COMPARISON: None. FINDINGS: 3 views of the left hand were obtained. There are mild degenerative  changes along the first carpometacarpal joint with osteophyte formation. Remaining bony alignment is intact. No acute fracture or dislocation identified. Mild soft tissue swelling. 3 views of the left wrist were obtained. There is mild widening of the  scapholunate interval measuring approximately 4 mm. Remaining bony alignment and  joint spaces are intact. No acute osseous fracture identified. Soft tissues are  unremarkable. Impression  1. Mild widening of the scapholunate interval, suggesting injury of the  scapholunate ligament.  Further evaluation with MRI may be beneficial.    2. No acute osseous fracture identified. Diagnosis Orders   1. Contusion of left ulnar nerve, initial encounter        2. Contusion of left wrist, initial encounter              Plan:     Ice and elevation as needed. Due to the improvement of the symptoms she is having we will observe for now. Return if symptoms worsen or fail to improve. Plan was reviewed with patient, who verbalized agreement and understanding of the plan    Note: This note was completed using voice recognition software.   Any typographical/name errors or mistakes are unintentional.

## 2023-02-28 RX ORDER — ERGOCALCIFEROL 1.25 MG/1
CAPSULE ORAL
Qty: 24 CAPSULE | Refills: 0 | Status: SHIPPED | OUTPATIENT
Start: 2023-02-28

## 2023-02-28 RX ORDER — AMLODIPINE BESYLATE 5 MG/1
TABLET ORAL
Qty: 90 TABLET | Refills: 1 | Status: SHIPPED | OUTPATIENT
Start: 2023-02-28

## 2023-02-28 RX ORDER — MONTELUKAST SODIUM 10 MG/1
TABLET ORAL
Qty: 90 TABLET | Refills: 1 | Status: SHIPPED | OUTPATIENT
Start: 2023-02-28

## 2023-04-14 SDOH — ECONOMIC STABILITY: TRANSPORTATION INSECURITY
IN THE PAST 12 MONTHS, HAS LACK OF TRANSPORTATION KEPT YOU FROM MEETINGS, WORK, OR FROM GETTING THINGS NEEDED FOR DAILY LIVING?: NO

## 2023-04-14 SDOH — ECONOMIC STABILITY: FOOD INSECURITY: WITHIN THE PAST 12 MONTHS, YOU WORRIED THAT YOUR FOOD WOULD RUN OUT BEFORE YOU GOT MONEY TO BUY MORE.: NEVER TRUE

## 2023-04-14 SDOH — ECONOMIC STABILITY: HOUSING INSECURITY
IN THE LAST 12 MONTHS, WAS THERE A TIME WHEN YOU DID NOT HAVE A STEADY PLACE TO SLEEP OR SLEPT IN A SHELTER (INCLUDING NOW)?: NO

## 2023-04-14 SDOH — ECONOMIC STABILITY: FOOD INSECURITY: WITHIN THE PAST 12 MONTHS, THE FOOD YOU BOUGHT JUST DIDN'T LAST AND YOU DIDN'T HAVE MONEY TO GET MORE.: NEVER TRUE

## 2023-04-14 SDOH — ECONOMIC STABILITY: INCOME INSECURITY: HOW HARD IS IT FOR YOU TO PAY FOR THE VERY BASICS LIKE FOOD, HOUSING, MEDICAL CARE, AND HEATING?: NOT VERY HARD

## 2023-04-17 ENCOUNTER — OFFICE VISIT (OUTPATIENT)
Age: 58
End: 2023-04-17
Payer: COMMERCIAL

## 2023-04-17 VITALS
HEART RATE: 76 BPM | DIASTOLIC BLOOD PRESSURE: 82 MMHG | WEIGHT: 287.6 LBS | BODY MASS INDEX: 45.14 KG/M2 | RESPIRATION RATE: 16 BRPM | TEMPERATURE: 96.7 F | SYSTOLIC BLOOD PRESSURE: 128 MMHG | HEIGHT: 67 IN

## 2023-04-17 DIAGNOSIS — F41.9 ANXIETY: ICD-10-CM

## 2023-04-17 DIAGNOSIS — E55.9 VITAMIN D DEFICIENCY: ICD-10-CM

## 2023-04-17 DIAGNOSIS — E78.1 HYPERTRIGLYCERIDEMIA: ICD-10-CM

## 2023-04-17 DIAGNOSIS — E11.9 CONTROLLED TYPE 2 DIABETES MELLITUS WITHOUT COMPLICATION, WITHOUT LONG-TERM CURRENT USE OF INSULIN (HCC): ICD-10-CM

## 2023-04-17 DIAGNOSIS — J45.40 MODERATE PERSISTENT ASTHMA WITHOUT COMPLICATION: ICD-10-CM

## 2023-04-17 DIAGNOSIS — E78.00 PURE HYPERCHOLESTEROLEMIA: ICD-10-CM

## 2023-04-17 DIAGNOSIS — F33.9 RECURRENT DEPRESSION (HCC): ICD-10-CM

## 2023-04-17 DIAGNOSIS — K21.9 GASTROESOPHAGEAL REFLUX DISEASE WITHOUT ESOPHAGITIS: ICD-10-CM

## 2023-04-17 DIAGNOSIS — I10 PRIMARY HYPERTENSION: Primary | ICD-10-CM

## 2023-04-17 DIAGNOSIS — E66.01 OBESITY, MORBID (HCC): ICD-10-CM

## 2023-04-17 PROCEDURE — 3044F HG A1C LEVEL LT 7.0%: CPT | Performed by: FAMILY MEDICINE

## 2023-04-17 PROCEDURE — 3079F DIAST BP 80-89 MM HG: CPT | Performed by: FAMILY MEDICINE

## 2023-04-17 PROCEDURE — 3074F SYST BP LT 130 MM HG: CPT | Performed by: FAMILY MEDICINE

## 2023-04-17 PROCEDURE — 99214 OFFICE O/P EST MOD 30 MIN: CPT | Performed by: FAMILY MEDICINE

## 2023-04-17 RX ORDER — BLOOD SUGAR DIAGNOSTIC
STRIP MISCELLANEOUS
COMMUNITY
Start: 2023-01-19

## 2023-04-17 RX ORDER — SOY PROTEIN
1 POWDER (GRAM) ORAL
COMMUNITY

## 2023-04-17 ASSESSMENT — PATIENT HEALTH QUESTIONNAIRE - PHQ9
9. THOUGHTS THAT YOU WOULD BE BETTER OFF DEAD, OR OF HURTING YOURSELF: 0
SUM OF ALL RESPONSES TO PHQ QUESTIONS 1-9: 12
7. TROUBLE CONCENTRATING ON THINGS, SUCH AS READING THE NEWSPAPER OR WATCHING TELEVISION: 1
2. FEELING DOWN, DEPRESSED OR HOPELESS: 1
SUM OF ALL RESPONSES TO PHQ QUESTIONS 1-9: 12
8. MOVING OR SPEAKING SO SLOWLY THAT OTHER PEOPLE COULD HAVE NOTICED. OR THE OPPOSITE, BEING SO FIGETY OR RESTLESS THAT YOU HAVE BEEN MOVING AROUND A LOT MORE THAN USUAL: 1
4. FEELING TIRED OR HAVING LITTLE ENERGY: 3
3. TROUBLE FALLING OR STAYING ASLEEP: 1
5. POOR APPETITE OR OVEREATING: 3
SUM OF ALL RESPONSES TO PHQ QUESTIONS 1-9: 12
SUM OF ALL RESPONSES TO PHQ9 QUESTIONS 1 & 2: 2
6. FEELING BAD ABOUT YOURSELF - OR THAT YOU ARE A FAILURE OR HAVE LET YOURSELF OR YOUR FAMILY DOWN: 1
SUM OF ALL RESPONSES TO PHQ QUESTIONS 1-9: 12
10. IF YOU CHECKED OFF ANY PROBLEMS, HOW DIFFICULT HAVE THESE PROBLEMS MADE IT FOR YOU TO DO YOUR WORK, TAKE CARE OF THINGS AT HOME, OR GET ALONG WITH OTHER PEOPLE: 1
1. LITTLE INTEREST OR PLEASURE IN DOING THINGS: 1

## 2023-04-17 ASSESSMENT — ANXIETY QUESTIONNAIRES
GAD7 TOTAL SCORE: 13
4. TROUBLE RELAXING: 0
6. BECOMING EASILY ANNOYED OR IRRITABLE: 3
1. FEELING NERVOUS, ANXIOUS, OR ON EDGE: 2
2. NOT BEING ABLE TO STOP OR CONTROL WORRYING: 3
IF YOU CHECKED OFF ANY PROBLEMS ON THIS QUESTIONNAIRE, HOW DIFFICULT HAVE THESE PROBLEMS MADE IT FOR YOU TO DO YOUR WORK, TAKE CARE OF THINGS AT HOME, OR GET ALONG WITH OTHER PEOPLE: SOMEWHAT DIFFICULT
3. WORRYING TOO MUCH ABOUT DIFFERENT THINGS: 3
7. FEELING AFRAID AS IF SOMETHING AWFUL MIGHT HAPPEN: 2
5. BEING SO RESTLESS THAT IT IS HARD TO SIT STILL: 0

## 2023-04-17 NOTE — PROGRESS NOTES
Vitor Beltran, 65 y.o.,  female    SUBJECTIVE  Ff-up    DM-  She is currently taking metformin, farxiga and trulicity, following endocrinology. A1c 6.5, has ff-up appt with them in July, wants me to order labs a1c/cmp    HL- currently on zetia and vascepa been intolerant to statins including latest trial crestor. Reports father/brother CAD/CABG. False positive stress test 4/2022, cardiac cath 5/2022 normal coronaries. Reviewed cardiology notes, started on repatha February and tolerating. Requesting me to order lipids prior to ff-up appt with cards in May. Depression/anxiety-she reports about the same, she takes zoloft 100-150 mg depending on stressors. And very occasional use of  taking ativan. 12 pills x 6 months. Having a harder time with finances upon prison. She plans to increase zoloft to 150 mg, and I am agreeable to thisl     HTN- taking norvasc 5 mg, and losartan 100 mg. GERD- doing well on prilosec. Asthma- is doing well, no recent exacerbation, She is on advair. On singulair and zyrtec as well for allergies. Vit d def- she is s/p gastric bypass, says taking 50,000 iu vit d 2 x a week. Per pt utd with gyne care with dr. Thelma Johnson, reminded overdue for mammogram she prefers to get from gyne. ROS:  See HPI, all others negative        Patient Active Problem List   Diagnosis Code    HTN (hypertension) I10    Hypertriglyceridemia E78.1    Allergic rhinitis J30.9    Depression F32. A    S/P gastric bypass Z98.84    Anxiety F41.9    Iron deficiency E61.1    Vitamin D deficiency E55.9    Irregular menses N92.6    Vertigo R42    Onychomycosis B35.1    Impaired glucose tolerance R73.02    Rosacea L71.9    Gastroesophageal reflux disease without esophagitis K21.9    Asthma J45.909    Obesity, morbid (HCC) E66.01    Recurrent depression ([facility] F33.9    Pure hypercholesterolemia E78.00    Controlled type 2 diabetes mellitus without complication, without long-term current use of insulin ([facility]

## 2023-04-17 NOTE — PROGRESS NOTES
1. \"Have you been to the ER, urgent care clinic since your last visit? Hospitalized since your last visit? \" No    2. \"Have you seen or consulted any other health care providers outside of the [address] [place] [address] since your last visit? \" No     3. For patients aged 39-70: Has the patient had a colonoscopy / FIT/ Cologuard? Yes - no Care Gap present 11/07/22      If the patient is female:    4. For patients aged 41-77: Has the patient had a mammogram within the past 2 years? Yes - Care Gap present. Most recent result on file 7/21/21      5. For patients aged 21-65: Has the patient had a pap smear?  Yes - no Care Gap present 7/20/21    Chief Complaint   Patient presents with    Diabetes    Depression    Hypertension    Gastroesophageal Reflux    Asthma    Other     Vitamin B     Fall     [place] one week ago - right shoulder pain

## 2023-04-17 NOTE — PATIENT INSTRUCTIONS
DASH Diet: Care Instructions  Your Care Instructions     The DASH diet is an eating plan that can help lower your blood pressure. DASH stands for Dietary Approaches to Stop Hypertension. Hypertension is high blood pressure. The DASH diet focuses on eating foods that are high in calcium, potassium, and magnesium. These nutrients can lower blood pressure. The foods that are highest in these nutrients are fruits, vegetables, low-fat dairy products, nuts, seeds, and legumes. But taking calcium, potassium, and magnesium supplements instead of eating foods that are high in those nutrients does not have the same effect. The DASH diet also includes whole grains, fish, and poultry. The DASH diet is one of several lifestyle changes your doctor may recommend to lower your high blood pressure. Your doctor may also want you to decrease the amount of sodium in your diet. Lowering sodium while following the DASH diet can lower blood pressure even further than just the DASH diet alone. Follow-up care is a key part of your treatment and safety. Be sure to make and go to all appointments, and call your doctor if you are having problems. It's also a good idea to know your test results and keep a list of the medicines you take. How can you care for yourself at home? Following the DASH diet  Eat 4 to 5 servings of fruit each day. A serving is 1 medium-sized piece of fruit, ½ cup chopped or canned fruit, 1/4 cup dried fruit, or 4 ounces (½ cup) of fruit juice. Choose fruit more often than fruit juice. Eat 4 to 5 servings of vegetables each day. A serving is 1 cup of lettuce or raw leafy vegetables, ½ cup of chopped or cooked vegetables, or 4 ounces (½ cup) of vegetable juice. Choose vegetables more often than vegetable juice. Get 2 to 3 servings of low-fat and fat-free dairy each day. A serving is 8 ounces of milk, 1 cup of yogurt, or 1 ½ ounces of cheese. Eat 6 to 8 servings of grains each day.  A serving is 1 slice of bread, 1

## 2023-04-25 RX ORDER — SERTRALINE HYDROCHLORIDE 100 MG/1
150 TABLET, FILM COATED ORAL DAILY
Qty: 90 TABLET | Refills: 1 | Status: SHIPPED | OUTPATIENT
Start: 2023-04-25

## 2023-04-25 RX ORDER — OMEPRAZOLE 20 MG/1
20 CAPSULE, DELAYED RELEASE ORAL DAILY
Qty: 90 CAPSULE | Refills: 3 | Status: SHIPPED | OUTPATIENT
Start: 2023-04-25

## 2023-05-17 ENCOUNTER — HOSPITAL ENCOUNTER (OUTPATIENT)
Facility: HOSPITAL | Age: 58
Discharge: HOME | End: 2023-05-20
Payer: COMMERCIAL

## 2023-05-17 LAB
CHOLEST SERPL-MCNC: 125 MG/DL
HDLC SERPL-MCNC: 51 MG/DL (ref 40–60)
HDLC SERPL: 2.5 (ref 0–5)
LDLC SERPL CALC-MCNC: 36 MG/DL (ref 0–100)
LIPID PANEL: ABNORMAL
TRIGL SERPL-MCNC: 190 MG/DL
VLDLC SERPL CALC-MCNC: 38 MG/DL

## 2023-05-17 PROCEDURE — 80061 LIPID PANEL: CPT

## 2023-05-17 PROCEDURE — 36415 COLL VENOUS BLD VENIPUNCTURE: CPT

## 2023-05-17 RX ORDER — METFORMIN HYDROCHLORIDE 500 MG/1
2000 TABLET, EXTENDED RELEASE ORAL
Qty: 360 TABLET | Refills: 0 | OUTPATIENT
Start: 2023-05-17

## 2023-05-22 RX ORDER — METFORMIN HYDROCHLORIDE 500 MG/1
TABLET, EXTENDED RELEASE ORAL
Qty: 360 TABLET | Refills: 0 | OUTPATIENT
Start: 2023-05-22

## 2023-05-23 RX ORDER — METFORMIN HYDROCHLORIDE 500 MG/1
2000 TABLET, EXTENDED RELEASE ORAL
Qty: 360 TABLET | Refills: 1 | Status: SHIPPED | OUTPATIENT
Start: 2023-05-23

## 2023-05-30 ENCOUNTER — TELEPHONE (OUTPATIENT)
Age: 58
End: 2023-05-30

## 2023-05-30 RX ORDER — ICOSAPENT ETHYL 1000 MG/1
2 CAPSULE ORAL 2 TIMES DAILY WITH MEALS
Qty: 360 CAPSULE | Refills: 3 | Status: SHIPPED | OUTPATIENT
Start: 2023-05-30

## 2023-05-30 RX ORDER — ERGOCALCIFEROL 1.25 MG/1
50000 CAPSULE ORAL
Qty: 24 CAPSULE | Refills: 3 | Status: SHIPPED | OUTPATIENT
Start: 2023-06-01

## 2023-05-30 RX ORDER — FLUTICASONE PROPIONATE AND SALMETEROL XINAFOATE 45; 21 UG/1; UG/1
2 AEROSOL, METERED RESPIRATORY (INHALATION) 2 TIMES DAILY
Qty: 3 EACH | Refills: 3 | Status: SHIPPED | OUTPATIENT
Start: 2023-05-30

## 2023-05-30 RX ORDER — MONTELUKAST SODIUM 10 MG/1
10 TABLET ORAL DAILY
Qty: 90 TABLET | Refills: 3 | Status: SHIPPED | OUTPATIENT
Start: 2023-05-30

## 2023-05-30 RX ORDER — FLUTICASONE PROPIONATE 50 MCG
2 SPRAY, SUSPENSION (ML) NASAL DAILY
Qty: 16 G | Refills: 11 | Status: SHIPPED | OUTPATIENT
Start: 2023-05-30

## 2023-05-30 NOTE — TELEPHONE ENCOUNTER
Fax received from [address] [place] [address] meds stating prior auth is required for the Icosapent Ethyl 1Gm Caps. Submit a PA request  1. Go to key. [employer] and click \"Enter a Key\"  2. Patient last name: Oleg Maynard      : 1965      Key: [id number]  3.  Click \"start a PA\", complete the form, and \"send to plan\"

## 2023-07-13 ENCOUNTER — HOSPITAL ENCOUNTER (OUTPATIENT)
Facility: HOSPITAL | Age: 58
Discharge: HOME | End: 2023-07-13
Attending: FAMILY MEDICINE
Payer: COMMERCIAL

## 2023-07-13 DIAGNOSIS — Z12.31 VISIT FOR SCREENING MAMMOGRAM: ICD-10-CM

## 2023-07-13 PROCEDURE — 77063 BREAST TOMOSYNTHESIS BI: CPT

## 2023-07-25 RX ORDER — LOSARTAN POTASSIUM 100 MG/1
TABLET ORAL
Qty: 90 TABLET | Refills: 1 | Status: SHIPPED | OUTPATIENT
Start: 2023-07-25

## 2023-08-15 ENCOUNTER — HOSPITAL ENCOUNTER (OUTPATIENT)
Facility: HOSPITAL | Age: 58
Discharge: HOME | End: 2023-08-18

## 2023-08-15 LAB — LABCORP SPECIMEN COLLECTION: NORMAL

## 2023-08-16 LAB
ALBUMIN SERPL-MCNC: 4.1 G/DL (ref 3.8–4.9)
ALBUMIN/GLOB SERPL: 2 {RATIO} (ref 1.2–2.2)
ALP SERPL-CCNC: 118 IU/L (ref 44–121)
ALT SERPL-CCNC: 30 IU/L (ref 0–32)
AST SERPL-CCNC: 20 IU/L (ref 0–40)
BILIRUB SERPL-MCNC: 0.5 MG/DL (ref 0–1.2)
BUN SERPL-MCNC: 14 MG/DL (ref 6–24)
BUN/CREAT SERPL: 19 (ref 9–23)
CALCIUM SERPL-MCNC: 9.4 MG/DL (ref 8.7–10.2)
CHLORIDE SERPL-SCNC: 108 MMOL/L (ref 96–106)
CO2 SERPL-SCNC: 23 MMOL/L (ref 20–29)
CREAT SERPL-MCNC: 0.72 MG/DL (ref 0.57–1)
CREATININE, EXTERNAL: 0.72
EGFRCR SERPLBLD CKD-EPI 2021: 98 ML/MIN/1.73
GLOBULIN SER CALC-MCNC: 2.1 G/DL (ref 1.5–4.5)
GLUCOSE SERPL-MCNC: 99 MG/DL (ref 70–99)
HBA1C MFR BLD HPLC: 6.2 %
POTASSIUM SERPL-SCNC: 4.5 MMOL/L (ref 3.5–5.2)
PROT SERPL-MCNC: 6.2 G/DL (ref 6–8.5)
SODIUM SERPL-SCNC: 145 MMOL/L (ref 134–144)

## 2023-08-16 NOTE — PROGRESS NOTES
1. \"Have you been to the ER, urgent care clinic since your last visit? Hospitalized since your last visit? \" No    2. \"Have you seen or consulted any other health care providers outside of the [address] [place] [address] since your last visit? \" Dr. Kristen Cardona- Vein 06/14/2023   Aspen Kira diabetic 05/26/2023    3. For patients aged 43-73: Has the patient had a colonoscopy / FIT/ Cologuard? Yes - no Care Gap present      If the patient is female:    4. For patients aged 43-66: Has the patient had a mammogram within the past 2 years? Yes - no Care Gap present      5. For patients aged 21-65: Has the patient had a pap smear?  Yes - no Care Gap present

## 2023-08-17 ENCOUNTER — OFFICE VISIT (OUTPATIENT)
Age: 58
End: 2023-08-17
Payer: COMMERCIAL

## 2023-08-17 VITALS
TEMPERATURE: 97 F | RESPIRATION RATE: 16 BRPM | OXYGEN SATURATION: 99 % | SYSTOLIC BLOOD PRESSURE: 128 MMHG | WEIGHT: 278 LBS | DIASTOLIC BLOOD PRESSURE: 82 MMHG | HEART RATE: 76 BPM | BODY MASS INDEX: 43.63 KG/M2 | HEIGHT: 67 IN

## 2023-08-17 DIAGNOSIS — E11.9 CONTROLLED TYPE 2 DIABETES MELLITUS WITHOUT COMPLICATION, WITHOUT LONG-TERM CURRENT USE OF INSULIN (HCC): ICD-10-CM

## 2023-08-17 DIAGNOSIS — E11.9 CONTROLLED TYPE 2 DIABETES MELLITUS WITHOUT COMPLICATION, WITHOUT LONG-TERM CURRENT USE OF INSULIN (HCC): Primary | ICD-10-CM

## 2023-08-17 DIAGNOSIS — E78.00 PURE HYPERCHOLESTEROLEMIA: ICD-10-CM

## 2023-08-17 DIAGNOSIS — J30.9 ALLERGIC RHINITIS, UNSPECIFIED SEASONALITY, UNSPECIFIED TRIGGER: ICD-10-CM

## 2023-08-17 DIAGNOSIS — E66.01 OBESITY, MORBID (HCC): ICD-10-CM

## 2023-08-17 DIAGNOSIS — Z98.84 S/P GASTRIC BYPASS: ICD-10-CM

## 2023-08-17 DIAGNOSIS — J45.20 MILD INTERMITTENT ASTHMA WITHOUT COMPLICATION: ICD-10-CM

## 2023-08-17 DIAGNOSIS — F33.9 RECURRENT DEPRESSION (HCC): ICD-10-CM

## 2023-08-17 DIAGNOSIS — K21.9 GASTROESOPHAGEAL REFLUX DISEASE WITHOUT ESOPHAGITIS: ICD-10-CM

## 2023-08-17 PROCEDURE — 3074F SYST BP LT 130 MM HG: CPT | Performed by: FAMILY MEDICINE

## 2023-08-17 PROCEDURE — 99214 OFFICE O/P EST MOD 30 MIN: CPT | Performed by: FAMILY MEDICINE

## 2023-08-17 PROCEDURE — 3079F DIAST BP 80-89 MM HG: CPT | Performed by: FAMILY MEDICINE

## 2023-08-17 PROCEDURE — 3044F HG A1C LEVEL LT 7.0%: CPT | Performed by: FAMILY MEDICINE

## 2023-08-17 ASSESSMENT — PATIENT HEALTH QUESTIONNAIRE - PHQ9
SUM OF ALL RESPONSES TO PHQ9 QUESTIONS 1 & 2: 0
SUM OF ALL RESPONSES TO PHQ QUESTIONS 1-9: 0
2. FEELING DOWN, DEPRESSED OR HOPELESS: 0
1. LITTLE INTEREST OR PLEASURE IN DOING THINGS: 0
SUM OF ALL RESPONSES TO PHQ QUESTIONS 1-9: 0

## 2023-08-17 NOTE — PROGRESS NOTES
Khurram Joshua, 65 y.o.,  female    SUBJECTIVE  Ff-up    DM-  She is currently taking metformin, farxiga and trulicity, following endocrinology Luna rodriguez. A1c 6.2,  HL- currently on repatha, zetia and vascepa been intolerant to statins including latest trial crestor. Reports father/brother CAD/CABG. False positive stress test 4/2022, cardiac cath 5/2022 normal coronaries. Following cards    Depression/anxiety-she reports about the same, she takes zoloft 100-150 mg depending on stressors. And very occasional use of  taking ativan. 12 pills x 6 months. Having a harder time with finances upon long-term. She plans to increase zoloft to 150 mg, and I am agreeable to thisl     HTN- taking norvasc 5 mg, and losartan 100 mg. GERD- doing well on prilosec. Asthma- is doing well, no recent exacerbation, She is on advair. On singulair and zyrtec as well for allergies. Vit d def- she is s/p gastric bypass, says taking 50,000 iu vit d 2 x a week. Per pt utd with gyne care with dr. Madelin Brooks, per pt UTD    Ongoing venous ablation procedures with dr. Marcela Pepper  See HPI, all others negative        Patient Active Problem List   Diagnosis Code    HTN (hypertension) I10    Hypertriglyceridemia E78.1    Allergic rhinitis J30.9    Depression F32. A    S/P gastric bypass Z98.84    Anxiety F41.9    Iron deficiency E61.1    Vitamin D deficiency E55.9    Irregular menses N92.6    Vertigo R42    Onychomycosis B35.1    Impaired glucose tolerance R73.02    Rosacea L71.9    Gastroesophageal reflux disease without esophagitis K21.9    Asthma J45.909    Obesity, morbid (HCC) E66.01    Recurrent depression (HCC) F33.9    Pure hypercholesterolemia E78.00    Controlled type 2 diabetes mellitus without complication, without long-term current use of insulin (HCC) E11.9       Current Outpatient Medications:     losartan (COZAAR) 100 MG tablet, TAKE ONE TABLET BY MOUTH EVERY DAY, Disp: 90 tablet, Rfl: 1    vitamin D

## 2023-10-25 RX ORDER — SERTRALINE HYDROCHLORIDE 100 MG/1
TABLET, FILM COATED ORAL
Qty: 135 TABLET | Refills: 1 | Status: SHIPPED | OUTPATIENT
Start: 2023-10-25

## 2023-11-14 ENCOUNTER — HOSPITAL ENCOUNTER (OUTPATIENT)
Facility: HOSPITAL | Age: 58
Discharge: HOME | End: 2023-11-17

## 2023-11-14 LAB — LABCORP SPECIMEN COLLECTION: NORMAL

## 2023-11-15 LAB
ALBUMIN SERPL-MCNC: 4.2 G/DL (ref 3.8–4.9)
ALBUMIN/CREAT UR: <7 MG/G CREAT (ref 0–29)
ALBUMIN/GLOB SERPL: 2.3 {RATIO} (ref 1.2–2.2)
ALP SERPL-CCNC: 125 IU/L (ref 44–121)
ALT SERPL-CCNC: 30 IU/L (ref 0–32)
AST SERPL-CCNC: 20 IU/L (ref 0–40)
BILIRUB SERPL-MCNC: 0.5 MG/DL (ref 0–1.2)
BUN SERPL-MCNC: 14 MG/DL (ref 6–24)
BUN/CREAT SERPL: 22 (ref 9–23)
CALCIUM SERPL-MCNC: 9.1 MG/DL (ref 8.7–10.2)
CHLORIDE SERPL-SCNC: 102 MMOL/L (ref 96–106)
CO2 SERPL-SCNC: 21 MMOL/L (ref 20–29)
CREAT SERPL-MCNC: 0.63 MG/DL (ref 0.57–1)
CREAT UR-MCNC: 45.9 MG/DL
EGFRCR SERPLBLD CKD-EPI 2021: 103 ML/MIN/1.73
GLOBULIN SER CALC-MCNC: 1.8 G/DL (ref 1.5–4.5)
GLUCOSE SERPL-MCNC: 106 MG/DL (ref 70–99)
HBA1C MFR BLD: 6.5 % (ref 4.8–5.6)
MICROALBUMIN UR-MCNC: <3 UG/ML
POTASSIUM SERPL-SCNC: 4.5 MMOL/L (ref 3.5–5.2)
PROT SERPL-MCNC: 6 G/DL (ref 6–8.5)
SODIUM SERPL-SCNC: 141 MMOL/L (ref 134–144)

## 2023-11-17 NOTE — PROGRESS NOTES
1. \"Have you been to the ER, urgent care clinic since your last visit? Hospitalized since your last visit? \" No    2. \"Have you seen or consulted any other health care providers outside of the [address] [place] [address] since your last visit? \" 08/31/2023 Josue Tidwell NP diabetes   09/21/2023 [facility]     3. For patients aged 43-73: Has the patient had a colonoscopy / FIT/ Cologuard? NA - based on age  11/07/2022 FIT-Neg Due    If the patient is female:    4. For patients aged 43-66: Has the patient had a mammogram within the past 2 years? NA - based on age or sex  07/13/2023      5. For patients aged 21-65: Has the patient had a pap smear?  NA - based on age or sex  07/20/2021 (5) 2024

## 2023-11-20 ENCOUNTER — TELEMEDICINE (OUTPATIENT)
Age: 58
End: 2023-11-20
Payer: COMMERCIAL

## 2023-11-20 DIAGNOSIS — Z12.11 COLON CANCER SCREENING: ICD-10-CM

## 2023-11-20 DIAGNOSIS — Z83.49 FAMILY HISTORY OF THYROID DISEASE: ICD-10-CM

## 2023-11-20 DIAGNOSIS — K21.9 GASTROESOPHAGEAL REFLUX DISEASE WITHOUT ESOPHAGITIS: ICD-10-CM

## 2023-11-20 DIAGNOSIS — K91.2 POSTOPERATIVE MALABSORPTION: ICD-10-CM

## 2023-11-20 DIAGNOSIS — F33.9 RECURRENT DEPRESSION (HCC): ICD-10-CM

## 2023-11-20 DIAGNOSIS — J45.20 MILD INTERMITTENT ASTHMA WITHOUT COMPLICATION: ICD-10-CM

## 2023-11-20 DIAGNOSIS — E78.00 PURE HYPERCHOLESTEROLEMIA: ICD-10-CM

## 2023-11-20 DIAGNOSIS — E55.9 VITAMIN D DEFICIENCY: ICD-10-CM

## 2023-11-20 DIAGNOSIS — E11.9 CONTROLLED TYPE 2 DIABETES MELLITUS WITHOUT COMPLICATION, WITHOUT LONG-TERM CURRENT USE OF INSULIN (HCC): Primary | ICD-10-CM

## 2023-11-20 DIAGNOSIS — F41.9 ANXIETY: ICD-10-CM

## 2023-11-20 DIAGNOSIS — J30.9 ALLERGIC RHINITIS, UNSPECIFIED SEASONALITY, UNSPECIFIED TRIGGER: ICD-10-CM

## 2023-11-20 DIAGNOSIS — Z98.84 S/P GASTRIC BYPASS: ICD-10-CM

## 2023-11-20 DIAGNOSIS — I10 PRIMARY HYPERTENSION: ICD-10-CM

## 2023-11-20 DIAGNOSIS — E11.9 CONTROLLED TYPE 2 DIABETES MELLITUS WITHOUT COMPLICATION, WITHOUT LONG-TERM CURRENT USE OF INSULIN (HCC): ICD-10-CM

## 2023-11-20 PROCEDURE — 99214 OFFICE O/P EST MOD 30 MIN: CPT | Performed by: FAMILY MEDICINE

## 2023-11-20 PROCEDURE — 3044F HG A1C LEVEL LT 7.0%: CPT | Performed by: FAMILY MEDICINE

## 2023-11-20 RX ORDER — AMLODIPINE BESYLATE 5 MG/1
5 TABLET ORAL DAILY
Qty: 90 TABLET | Refills: 1 | Status: SHIPPED | OUTPATIENT
Start: 2023-11-20

## 2023-11-20 RX ORDER — MONTELUKAST SODIUM 10 MG/1
10 TABLET ORAL DAILY
Qty: 90 TABLET | Refills: 3 | Status: SHIPPED | OUTPATIENT
Start: 2023-11-20

## 2023-11-20 RX ORDER — FLUTICASONE PROPIONATE 50 MCG
2 SPRAY, SUSPENSION (ML) NASAL DAILY
Qty: 16 G | Refills: 11 | Status: SHIPPED | OUTPATIENT
Start: 2023-11-20

## 2023-11-20 NOTE — PROGRESS NOTES
Daniela Abbott, was evaluated through a synchronous (real-time) audio-video encounter. The patient (or guardian if applicable) is aware that this is a billable service, which includes applicable co-pays. This Virtual Visit was conducted with patient's (and/or legal guardian's) consent. Patient identification was verified, and a caregiver was present when appropriate. The patient was located at Home: [address] [place] [id number]  Provider was located at [facility] ([facility]): [address] [place]fren Harris,  [address] [facility] [place]      --Joanne Sow MD on 11/20/2023 at 10:42 AM    An electronic signature was used to authenticate this note. [phone]  Subjective:   Daniela Abbott is a 63 y.o. female who was seen for No chief complaint on file. DM-  She is currently taking metformin, farxiga and trulicity, following endocrinology Luna rodriguez. A1c 6.5,  HL- currently on repatha, zetia and vascepa been intolerant to statins including latest trial crestor. Reports father/brother CAD/CABG. False positive stress test 4/2022, cardiac cath 5/2022 normal coronaries. Following cards    Depression/anxiety-she reports about the same, she takes zoloft 100-150 mg depending on stressors. And very occasional use of  taking ativan. 12 pills x 6 months. Currently on 100 mg and managing fairly well. HTN- taking norvasc 5 mg, and losartan 100 mg. GERD- doing well on prilosec. Asthma- is doing well, no recent exacerbation, She is on advair. On singulair and zyrtec as well for allergies. Vit d def- she is s/p gastric bypass, says taking 50,000 iu vit d 2 x a week. Per pt utd with gyne care with dr. Erica Tello, per pt UTD    Ongoing venous ablation procedures with dr. Violeta Askew  Prior to Admission medications    Medication Sig Start Date End Date Taking?  Authorizing Provider   montelukast (SINGULAIR) 10 MG tablet Take 1 tablet by mouth daily 11/20/23  Yes Joanne Sow MD   fluticasone

## 2023-12-16 ENCOUNTER — CLINICAL DOCUMENTATION (OUTPATIENT)
Age: 58
End: 2023-12-16

## 2023-12-21 LAB — HEMOCCULT STL QL IA: NEGATIVE

## 2024-01-19 RX ORDER — LOSARTAN POTASSIUM 100 MG/1
TABLET ORAL
Qty: 90 TABLET | Refills: 1 | Status: SHIPPED | OUTPATIENT
Start: 2024-01-19

## 2024-02-22 ENCOUNTER — HOSPITAL ENCOUNTER (OUTPATIENT)
Facility: HOSPITAL | Age: 59
Discharge: HOME | End: 2024-02-25

## 2024-02-22 LAB — LABCORP SPECIMEN COLLECTION: NORMAL

## 2024-02-23 LAB
25(OH)D3+25(OH)D2 SERPL-MCNC: 47.8 NG/ML (ref 30–100)
ALBUMIN SERPL-MCNC: 4.2 G/DL (ref 3.8–4.9)
ALBUMIN/GLOB SERPL: 2.5 {RATIO} (ref 1.2–2.2)
ALP SERPL-CCNC: 120 IU/L (ref 44–121)
ALT SERPL-CCNC: 35 IU/L (ref 0–32)
AST SERPL-CCNC: 19 IU/L (ref 0–40)
BASOPHILS # BLD AUTO: 0 X10E3/UL (ref 0–0.2)
BASOPHILS NFR BLD AUTO: 0 %
BILIRUB SERPL-MCNC: 0.5 MG/DL (ref 0–1.2)
BUN SERPL-MCNC: 19 MG/DL (ref 6–24)
BUN/CREAT SERPL: 26 (ref 9–23)
CALCIUM SERPL-MCNC: 9.2 MG/DL (ref 8.7–10.2)
CHLORIDE SERPL-SCNC: 105 MMOL/L (ref 96–106)
CHOLEST SERPL-MCNC: 156 MG/DL (ref 100–199)
CO2 SERPL-SCNC: 23 MMOL/L (ref 20–29)
CREAT SERPL-MCNC: 0.74 MG/DL (ref 0.57–1)
EGFRCR SERPLBLD CKD-EPI 2021: 94 ML/MIN/1.73
EOSINOPHIL # BLD AUTO: 0.1 X10E3/UL (ref 0–0.4)
EOSINOPHIL NFR BLD AUTO: 2 %
ERYTHROCYTE [DISTWIDTH] IN BLOOD BY AUTOMATED COUNT: 12.5 % (ref 11.7–15.4)
FERRITIN SERPL-MCNC: 65 NG/ML (ref 15–150)
FOLATE SERPL-MCNC: 13.8 NG/ML
GLOBULIN SER CALC-MCNC: 1.7 G/DL (ref 1.5–4.5)
GLUCOSE SERPL-MCNC: 99 MG/DL (ref 70–99)
HBA1C MFR BLD: 6.3 % (ref 4.8–5.6)
HCT VFR BLD AUTO: 45 % (ref 34–46.6)
HDLC SERPL-MCNC: 55 MG/DL
HGB BLD-MCNC: 14.8 G/DL (ref 11.1–15.9)
IMM GRANULOCYTES # BLD AUTO: 0 X10E3/UL (ref 0–0.1)
IMM GRANULOCYTES NFR BLD AUTO: 0 %
IRON SATN MFR SERPL: 17 % (ref 15–55)
IRON SERPL-MCNC: 61 UG/DL (ref 27–159)
LDLC SERPL CALC-MCNC: 76 MG/DL (ref 0–99)
LYMPHOCYTES # BLD AUTO: 1.2 X10E3/UL (ref 0.7–3.1)
LYMPHOCYTES NFR BLD AUTO: 18 %
MCH RBC QN AUTO: 29.8 PG (ref 26.6–33)
MCHC RBC AUTO-ENTMCNC: 32.9 G/DL (ref 31.5–35.7)
MCV RBC AUTO: 91 FL (ref 79–97)
MONOCYTES # BLD AUTO: 0.4 X10E3/UL (ref 0.1–0.9)
MONOCYTES NFR BLD AUTO: 6 %
NEUTROPHILS # BLD AUTO: 5.2 X10E3/UL (ref 1.4–7)
NEUTROPHILS NFR BLD AUTO: 74 %
PLATELET # BLD AUTO: 307 X10E3/UL (ref 150–450)
POTASSIUM SERPL-SCNC: 4.5 MMOL/L (ref 3.5–5.2)
PROT SERPL-MCNC: 5.9 G/DL (ref 6–8.5)
RBC # BLD AUTO: 4.97 X10E6/UL (ref 3.77–5.28)
SODIUM SERPL-SCNC: 144 MMOL/L (ref 134–144)
SPECIMEN STATUS REPORT: NORMAL
TIBC SERPL-MCNC: 354 UG/DL (ref 250–450)
TRIGL SERPL-MCNC: 145 MG/DL (ref 0–149)
TSH SERPL DL<=0.005 MIU/L-ACNC: 3.07 UIU/ML (ref 0.45–4.5)
UIBC SERPL-MCNC: 293 UG/DL (ref 131–425)
VIT B12 SERPL-MCNC: 1556 PG/ML (ref 232–1245)
VLDLC SERPL CALC-MCNC: 25 MG/DL (ref 5–40)
WBC # BLD AUTO: 7 X10E3/UL (ref 3.4–10.8)

## 2024-02-24 LAB
ALBUMIN/CREAT UR: 8 MG/G CREAT (ref 0–29)
CREAT UR-MCNC: 118.9 MG/DL
MICROALBUMIN UR-MCNC: 9.8 UG/ML

## 2024-02-28 ENCOUNTER — OFFICE VISIT (OUTPATIENT)
Age: 59
End: 2024-02-28
Payer: COMMERCIAL

## 2024-02-28 VITALS
DIASTOLIC BLOOD PRESSURE: 78 MMHG | RESPIRATION RATE: 18 BRPM | BODY MASS INDEX: 43.95 KG/M2 | WEIGHT: 280 LBS | SYSTOLIC BLOOD PRESSURE: 122 MMHG | HEART RATE: 78 BPM | OXYGEN SATURATION: 97 % | HEIGHT: 67 IN | TEMPERATURE: 97.9 F

## 2024-02-28 DIAGNOSIS — E55.9 VITAMIN D DEFICIENCY: ICD-10-CM

## 2024-02-28 DIAGNOSIS — J45.20 MILD INTERMITTENT ASTHMA WITHOUT COMPLICATION: ICD-10-CM

## 2024-02-28 DIAGNOSIS — J30.9 ALLERGIC RHINITIS, UNSPECIFIED SEASONALITY, UNSPECIFIED TRIGGER: ICD-10-CM

## 2024-02-28 DIAGNOSIS — I10 PRIMARY HYPERTENSION: ICD-10-CM

## 2024-02-28 DIAGNOSIS — E11.9 CONTROLLED TYPE 2 DIABETES MELLITUS WITHOUT COMPLICATION, WITHOUT LONG-TERM CURRENT USE OF INSULIN (HCC): Primary | ICD-10-CM

## 2024-02-28 DIAGNOSIS — Z98.84 S/P GASTRIC BYPASS: ICD-10-CM

## 2024-02-28 DIAGNOSIS — F33.9 RECURRENT DEPRESSION (HCC): ICD-10-CM

## 2024-02-28 DIAGNOSIS — F41.9 ANXIETY: ICD-10-CM

## 2024-02-28 DIAGNOSIS — E11.9 CONTROLLED TYPE 2 DIABETES MELLITUS WITHOUT COMPLICATION, WITHOUT LONG-TERM CURRENT USE OF INSULIN (HCC): ICD-10-CM

## 2024-02-28 PROCEDURE — 3044F HG A1C LEVEL LT 7.0%: CPT | Performed by: FAMILY MEDICINE

## 2024-02-28 PROCEDURE — 99214 OFFICE O/P EST MOD 30 MIN: CPT | Performed by: FAMILY MEDICINE

## 2024-02-28 PROCEDURE — 3078F DIAST BP <80 MM HG: CPT | Performed by: FAMILY MEDICINE

## 2024-02-28 PROCEDURE — 3074F SYST BP LT 130 MM HG: CPT | Performed by: FAMILY MEDICINE

## 2024-02-28 RX ORDER — SERTRALINE HYDROCHLORIDE 100 MG/1
150 TABLET, FILM COATED ORAL DAILY
Qty: 135 TABLET | Refills: 1 | Status: SHIPPED | OUTPATIENT
Start: 2024-02-28

## 2024-02-28 RX ORDER — APIXABAN 5 MG/1
5 TABLET, FILM COATED ORAL 2 TIMES DAILY
COMMUNITY
Start: 2024-01-24

## 2024-02-28 RX ORDER — EVOLOCUMAB 140 MG/ML
140 INJECTION, SOLUTION SUBCUTANEOUS
COMMUNITY
Start: 2023-02-05

## 2024-02-28 ASSESSMENT — PATIENT HEALTH QUESTIONNAIRE - PHQ9
1. LITTLE INTEREST OR PLEASURE IN DOING THINGS: 1
3. TROUBLE FALLING OR STAYING ASLEEP: 1
6. FEELING BAD ABOUT YOURSELF - OR THAT YOU ARE A FAILURE OR HAVE LET YOURSELF OR YOUR FAMILY DOWN: 1
8. MOVING OR SPEAKING SO SLOWLY THAT OTHER PEOPLE COULD HAVE NOTICED. OR THE OPPOSITE, BEING SO FIGETY OR RESTLESS THAT YOU HAVE BEEN MOVING AROUND A LOT MORE THAN USUAL: 0
SUM OF ALL RESPONSES TO PHQ QUESTIONS 1-9: 7
10. IF YOU CHECKED OFF ANY PROBLEMS, HOW DIFFICULT HAVE THESE PROBLEMS MADE IT FOR YOU TO DO YOUR WORK, TAKE CARE OF THINGS AT HOME, OR GET ALONG WITH OTHER PEOPLE: 1
SUM OF ALL RESPONSES TO PHQ9 QUESTIONS 1 & 2: 2
2. FEELING DOWN, DEPRESSED OR HOPELESS: 1
SUM OF ALL RESPONSES TO PHQ QUESTIONS 1-9: 7
SUM OF ALL RESPONSES TO PHQ QUESTIONS 1-9: 7
5. POOR APPETITE OR OVEREATING: 1
SUM OF ALL RESPONSES TO PHQ QUESTIONS 1-9: 7
9. THOUGHTS THAT YOU WOULD BE BETTER OFF DEAD, OR OF HURTING YOURSELF: 0
7. TROUBLE CONCENTRATING ON THINGS, SUCH AS READING THE NEWSPAPER OR WATCHING TELEVISION: 1
4. FEELING TIRED OR HAVING LITTLE ENERGY: 1

## 2024-02-28 ASSESSMENT — ANXIETY QUESTIONNAIRES
4. TROUBLE RELAXING: 1
IF YOU CHECKED OFF ANY PROBLEMS ON THIS QUESTIONNAIRE, HOW DIFFICULT HAVE THESE PROBLEMS MADE IT FOR YOU TO DO YOUR WORK, TAKE CARE OF THINGS AT HOME, OR GET ALONG WITH OTHER PEOPLE: SOMEWHAT DIFFICULT
GAD7 TOTAL SCORE: 6
2. NOT BEING ABLE TO STOP OR CONTROL WORRYING: 1
6. BECOMING EASILY ANNOYED OR IRRITABLE: 1
1. FEELING NERVOUS, ANXIOUS, OR ON EDGE: 1
5. BEING SO RESTLESS THAT IT IS HARD TO SIT STILL: 1
7. FEELING AFRAID AS IF SOMETHING AWFUL MIGHT HAPPEN: 0
3. WORRYING TOO MUCH ABOUT DIFFERENT THINGS: 1

## 2024-02-28 NOTE — PROGRESS NOTES
\"Have you been to the ER, urgent care clinic since your last visit?  Hospitalized since your last visit?\"    YES - When: approximately 1 month ago.  Where and Why: [facility], 1/24/2024, Dr. Buckley.    “Have you seen or consulted any other health care providers outside of [facility] since your last visit?”    NO         
  Component Value Date/Time    CHOL 156 02/22/2024 12:00 AM    CHOL 125 05/17/2023 10:32 AM    TRIG 145 02/22/2024 12:00 AM    HDL 55 02/22/2024 12:00 AM    LDLCALC 76 02/22/2024 12:00 AM    CHOLHDLRATIO 2.5 05/17/2023 10:32 AM         Imaging results last 24 hrs :No results found.    Imaging results impression onlyNo results found.   No orders to display       A1c:   Hemoglobin A1C   Date Value Ref Range Status   02/22/2024 6.3 (H) 4.8 - 5.6 % Final     Comment:                 Prediabetes: 5.7 - 6.4           Diabetes: >6.4           Glycemic control for adults with diabetes: <7.0     11/14/2023 6.5 (H) 4.8 - 5.6 % Final     Comment:              Prediabetes: 5.7 - 6.4           Diabetes: >6.4           Glycemic control for adults with diabetes: <7.0     04/11/2023 6.4 (H) 4.8 - 5.6 % Final     Comment:              Prediabetes: 5.7 - 6.4           Diabetes: >6.4           Glycemic control for adults with diabetes: <7.0     04/11/2023 6.4 (H) 4.8 - 5.6 % Final     Comment:              Prediabetes: 5.7 - 6.4           Diabetes: >6.4           Glycemic control for adults with diabetes: <7.0               ASSESSMENT/PLAN  Toña was seen today for other, hypertension, anxiety and depression.    Diagnoses and all orders for this visit:    Diabetes mellitus without complication, without long term current use of insulin  A1c 6.3  On metformin, trulicity, farxiga, discussed and agreed for better accuracy to get refills from endo  Intolerant to statin  On ARB  ADA diet discussed  eye exam-- Dr. Bhandari  Foot exam 2/2024  Check cmp, a1c prior to next visit  Following dr. rodriguez  -     HEMOGLOBIN A1C WITH EAG; Future  -     METABOLIC PANEL, COMPREHENSIVE; Future      Primary hypertension-  controlled  Cont norvasc   Cont losartan   monitoring    Hyperlipidemia   goal LDL< 70  Improved on repatha, cont vascepa  intolerant to statin, but with fam hx    S/P gastric bypass    Vitamin D Deficiency   s/p gastric bypass  Now

## 2024-03-01 LAB — VIT B1 BLD-SCNC: 169.4 NMOL/L (ref 66.5–200)

## 2024-04-15 RX ORDER — OMEPRAZOLE 20 MG/1
20 CAPSULE, DELAYED RELEASE ORAL DAILY
Qty: 90 CAPSULE | Refills: 3 | Status: SHIPPED | OUTPATIENT
Start: 2024-04-15

## 2024-04-16 RX ORDER — OMEPRAZOLE 20 MG/1
20 CAPSULE, DELAYED RELEASE ORAL DAILY
Qty: 90 CAPSULE | Refills: 3 | Status: SHIPPED | OUTPATIENT
Start: 2024-04-16

## 2024-04-22 RX ORDER — ICOSAPENT ETHYL 1000 MG/1
2 CAPSULE ORAL 2 TIMES DAILY WITH MEALS
Qty: 360 CAPSULE | Refills: 3 | Status: SHIPPED | OUTPATIENT
Start: 2024-04-22

## 2024-04-22 RX ORDER — FLUTICASONE PROPIONATE 50 MCG
2 SPRAY, SUSPENSION (ML) NASAL DAILY
Qty: 16 G | Refills: 11 | Status: SHIPPED | OUTPATIENT
Start: 2024-04-22

## 2024-04-22 RX ORDER — FLUTICASONE PROPIONATE AND SALMETEROL XINAFOATE 45; 21 UG/1; UG/1
2 AEROSOL, METERED RESPIRATORY (INHALATION) 2 TIMES DAILY
Qty: 3 EACH | Refills: 3 | Status: SHIPPED | OUTPATIENT
Start: 2024-04-22

## 2024-04-22 RX ORDER — DAPAGLIFLOZIN 10 MG/1
10 TABLET, FILM COATED ORAL DAILY
Qty: 90 TABLET | Refills: 1 | OUTPATIENT
Start: 2024-04-22

## 2024-05-13 RX ORDER — AMLODIPINE BESYLATE 5 MG/1
5 TABLET ORAL DAILY
Qty: 90 TABLET | Refills: 3 | Status: SHIPPED | OUTPATIENT
Start: 2024-05-13

## 2024-07-11 DIAGNOSIS — F33.9 RECURRENT DEPRESSION (HCC): ICD-10-CM

## 2024-07-11 RX ORDER — FLUTICASONE PROPIONATE 50 MCG
2 SPRAY, SUSPENSION (ML) NASAL DAILY
Qty: 16 G | Refills: 11 | Status: SHIPPED | OUTPATIENT
Start: 2024-07-11

## 2024-07-11 RX ORDER — DAPAGLIFLOZIN 10 MG/1
10 TABLET, FILM COATED ORAL DAILY
Qty: 90 TABLET | Refills: 1 | Status: SHIPPED | OUTPATIENT
Start: 2024-07-11

## 2024-07-11 RX ORDER — SERTRALINE HYDROCHLORIDE 100 MG/1
150 TABLET, FILM COATED ORAL DAILY
Qty: 135 TABLET | Refills: 1 | Status: SHIPPED | OUTPATIENT
Start: 2024-07-11

## 2024-07-11 RX ORDER — LOSARTAN POTASSIUM 100 MG/1
100 TABLET ORAL DAILY
Qty: 90 TABLET | Refills: 1 | Status: SHIPPED | OUTPATIENT
Start: 2024-07-11

## 2024-07-11 RX ORDER — ERGOCALCIFEROL 1.25 MG/1
50000 CAPSULE ORAL
Qty: 24 CAPSULE | Refills: 3 | Status: SHIPPED | OUTPATIENT
Start: 2024-07-11

## 2024-08-14 ENCOUNTER — HOSPITAL ENCOUNTER (OUTPATIENT)
Facility: HOSPITAL | Age: 59
Discharge: HOME | End: 2024-08-17

## 2024-08-14 LAB — LABCORP SPECIMEN COLLECTION: NORMAL

## 2024-08-14 PROCEDURE — 99001 SPECIMEN HANDLING PT-LAB: CPT

## 2024-08-15 LAB
ALBUMIN SERPL-MCNC: 4 G/DL (ref 3.8–4.9)
ALP SERPL-CCNC: 117 IU/L (ref 44–121)
ALT SERPL-CCNC: 46 IU/L (ref 0–32)
AST SERPL-CCNC: 27 IU/L (ref 0–40)
BILIRUB SERPL-MCNC: 0.4 MG/DL (ref 0–1.2)
BUN SERPL-MCNC: 19 MG/DL (ref 6–24)
BUN/CREAT SERPL: 29 (ref 9–23)
CALCIUM SERPL-MCNC: 9.1 MG/DL (ref 8.7–10.2)
CHLORIDE SERPL-SCNC: 109 MMOL/L (ref 96–106)
CO2 SERPL-SCNC: 20 MMOL/L (ref 20–29)
CREAT SERPL-MCNC: 0.66 MG/DL (ref 0.57–1)
EGFRCR SERPLBLD CKD-EPI 2021: 102 ML/MIN/1.73
GLOBULIN SER CALC-MCNC: 1.7 G/DL (ref 1.5–4.5)
GLUCOSE SERPL-MCNC: 108 MG/DL (ref 70–99)
HBA1C MFR BLD: 6.4 % (ref 4.8–5.6)
POTASSIUM SERPL-SCNC: 4.5 MMOL/L (ref 3.5–5.2)
PROT SERPL-MCNC: 5.7 G/DL (ref 6–8.5)
SODIUM SERPL-SCNC: 145 MMOL/L (ref 134–144)

## 2024-08-29 ENCOUNTER — OFFICE VISIT (OUTPATIENT)
Facility: CLINIC | Age: 59
End: 2024-08-29

## 2024-08-29 VITALS
WEIGHT: 273.8 LBS | HEIGHT: 67 IN | RESPIRATION RATE: 16 BRPM | SYSTOLIC BLOOD PRESSURE: 110 MMHG | DIASTOLIC BLOOD PRESSURE: 80 MMHG | BODY MASS INDEX: 42.97 KG/M2 | TEMPERATURE: 99.2 F | HEART RATE: 72 BPM | OXYGEN SATURATION: 96 %

## 2024-08-29 DIAGNOSIS — Z00.00 WELL ADULT EXAM: Primary | ICD-10-CM

## 2024-08-29 DIAGNOSIS — Z90.49 HISTORY OF CHOLECYSTECTOMY: ICD-10-CM

## 2024-08-29 DIAGNOSIS — I10 PRIMARY HYPERTENSION: ICD-10-CM

## 2024-08-29 DIAGNOSIS — E11.9 CONTROLLED TYPE 2 DIABETES MELLITUS WITHOUT COMPLICATION, WITHOUT LONG-TERM CURRENT USE OF INSULIN (HCC): ICD-10-CM

## 2024-08-29 DIAGNOSIS — E78.5 DYSLIPIDEMIA: ICD-10-CM

## 2024-08-29 DIAGNOSIS — J30.9 ALLERGIC RHINITIS, UNSPECIFIED SEASONALITY, UNSPECIFIED TRIGGER: ICD-10-CM

## 2024-08-29 DIAGNOSIS — F33.9 RECURRENT DEPRESSION (HCC): ICD-10-CM

## 2024-08-29 DIAGNOSIS — J45.20 MILD INTERMITTENT ASTHMA WITHOUT COMPLICATION: ICD-10-CM

## 2024-08-29 DIAGNOSIS — Z98.84 S/P GASTRIC BYPASS: ICD-10-CM

## 2024-08-29 DIAGNOSIS — K43.9 VENTRAL HERNIA WITHOUT OBSTRUCTION OR GANGRENE: ICD-10-CM

## 2024-08-29 DIAGNOSIS — E66.01 OBESITY, MORBID (HCC): ICD-10-CM

## 2024-08-29 DIAGNOSIS — E78.00 PURE HYPERCHOLESTEROLEMIA: ICD-10-CM

## 2024-08-29 DIAGNOSIS — E55.9 VITAMIN D DEFICIENCY: ICD-10-CM

## 2024-08-29 DIAGNOSIS — K21.9 GASTROESOPHAGEAL REFLUX DISEASE WITHOUT ESOPHAGITIS: ICD-10-CM

## 2024-08-29 DIAGNOSIS — E78.1 HYPERTRIGLYCERIDEMIA: ICD-10-CM

## 2024-08-29 DIAGNOSIS — F41.9 ANXIETY: ICD-10-CM

## 2024-08-29 RX ORDER — LANCETS 33 GAUGE
EACH MISCELLANEOUS
COMMUNITY

## 2024-08-29 RX ORDER — ASPIRIN 81 MG/1
81 TABLET, CHEWABLE ORAL 2 TIMES DAILY
COMMUNITY

## 2024-08-29 RX ORDER — BLOOD SUGAR DIAGNOSTIC
STRIP MISCELLANEOUS
COMMUNITY

## 2024-08-29 SDOH — ECONOMIC STABILITY: INCOME INSECURITY: HOW HARD IS IT FOR YOU TO PAY FOR THE VERY BASICS LIKE FOOD, HOUSING, MEDICAL CARE, AND HEATING?: SOMEWHAT HARD

## 2024-08-29 SDOH — ECONOMIC STABILITY: FOOD INSECURITY: WITHIN THE PAST 12 MONTHS, THE FOOD YOU BOUGHT JUST DIDN'T LAST AND YOU DIDN'T HAVE MONEY TO GET MORE.: NEVER TRUE

## 2024-08-29 SDOH — ECONOMIC STABILITY: FOOD INSECURITY: WITHIN THE PAST 12 MONTHS, YOU WORRIED THAT YOUR FOOD WOULD RUN OUT BEFORE YOU GOT MONEY TO BUY MORE.: NEVER TRUE

## 2024-08-29 NOTE — PROGRESS NOTES
Toña CARRINGTON Jcarlos, 58 y.o.,  female    SUBJECTIVE  CPE    Routine gyne exam with dr. Rasmussen per pt UTD    Requesting physical form completed    ROS  See HPI, all others negative        Patient Active Problem List   Diagnosis Code    HTN (hypertension) I10    Hypertriglyceridemia E78.1    Allergic rhinitis J30.9    Depression F32.A    S/P gastric bypass Z98.84    Anxiety F41.9    Iron deficiency E61.1    Vitamin D deficiency E55.9    Irregular menses N92.6    Vertigo R42    Onychomycosis B35.1    Impaired glucose tolerance R73.02    Rosacea L71.9    Gastroesophageal reflux disease without esophagitis K21.9    Asthma J45.909    Obesity, morbid (HCC) E66.01    Recurrent depression (HCC) F33.9    Pure hypercholesterolemia E78.00    Controlled type 2 diabetes mellitus without complication, without long-term current use of insulin (HCC) E11.9       Current Outpatient Medications:     aspirin 81 MG chewable tablet, Take 1 tablet by mouth in the morning and at bedtime, Disp: , Rfl:     dapagliflozin (FARXIGA) 10 MG tablet, Take 1 tablet by mouth daily, Disp: 90 tablet, Rfl: 1    vitamin D (ERGOCALCIFEROL) 1.25 MG (81028 UT) CAPS capsule, Take 1 capsule by mouth Twice a Week, Disp: 24 capsule, Rfl: 3    losartan (COZAAR) 100 MG tablet, Take 1 tablet by mouth daily, Disp: 90 tablet, Rfl: 1    sertraline (ZOLOFT) 100 MG tablet, Take 1.5 tablets by mouth daily TAKE ONE AND A HALF TABLETS BY MOUTH ONCE DAILY Strength: 100 mg, Disp: 135 tablet, Rfl: 1    fluticasone (FLONASE) 50 MCG/ACT nasal spray, 2 sprays by Nasal route daily, Disp: 16 g, Rfl: 11    amLODIPine (NORVASC) 5 MG tablet, TAKE ONE TABLET BY MOUTH EVERY DAY, Disp: 90 tablet, Rfl: 3    fluticasone-salmeterol (ADVAIR HFA) 45-21 MCG/ACT inhaler, Inhale 2 puffs into the lungs 2 times daily, Disp: 3 each, Rfl: 3    Icosapent Ethyl (VASCEPA) 1 g CAPS capsule, Take 2 capsules by mouth 2 times daily (with meals), Disp: 360 capsule, Rfl: 3    omeprazole (PRILOSEC) 20 MG delayed  1.702 m (5' 7\")   Wt 124.2 kg (273 lb 12.8 oz)   SpO2 96%   BMI 42.88 kg/m²     See above      Assessment and plan    Diagnoses and all orders for this visit:    Ventral hernia without obstruction or gangrene  -     CT ABDOMEN PELVIS W IV CONTRAST Additional Contrast? Radiologist Recommendation; Future    History of cholecystectomy  -     CT ABDOMEN PELVIS W IV CONTRAST Additional Contrast? Radiologist Recommendation; Future    S/P gastric bypass  -     CT ABDOMEN PELVIS W IV CONTRAST Additional Contrast? Radiologist Recommendation; Future    Primary hypertension-  controlled  Cont norvasc   Cont losartan   monitoring    Hyperlipidemia   goal LDL< 70  Improved on repatha, cont vascepa  intolerant to statin, but with fam hx    S/P gastric bypass    Vitamin D Deficiency   s/p gastric bypass  Cont   vitamin d to 50k twice a week  Monitoring    Recurrent Depression  Fair control  Cont zoloft to 100-150 mg zoloft mg,   On prn ativan 12 pills x 6 months,advised to wean off  Monitoring    Anxiety    Allergic rhinitis, unspecified allergic rhinitis type  Cont singulair/zyrtec    Gastroesophageal reflux disease without esophagitis-  Stable, Cont PPI    Asthma, mild intermittent, uncomplicated  Controlled, cont advair, singulair  prn albuterol    BMI 40-44 HCC  Encouraged wt loss s/p gastric bypass     Diabetes mellitus without complication, without long term current use of insulin  A1c 6.4  On metformin, trulicity, farxiga, discussed and agreed for better accuracy to get refills from endo  Intolerant to statin  On ARB  ADA diet discussed  eye exam-- Dr. Bhandari  Foot exam 2/2024  Following dr. rodriguez     Hypertriglyceridemia    S/p gastric bypass  Cont MVT supplement  Malabsorption labs (2/2024)    Ff-up in 3 months or sooner prn    Patient understands plan of care. Patient has provided input and agrees with goals.

## 2024-08-29 NOTE — PROGRESS NOTES
\"Have you been to the ER, urgent care clinic since your last visit?  Hospitalized since your last visit?\"    NO    “Have you seen or consulted any other health care providers outside of [facility] since your last visit?”    NO            Click Here for Release of Records Request

## 2024-09-09 ENCOUNTER — HOSPITAL ENCOUNTER (OUTPATIENT)
Facility: HOSPITAL | Age: 59
Discharge: HOME | End: 2024-09-12
Payer: COMMERCIAL

## 2024-09-09 VITALS — HEIGHT: 67 IN | BODY MASS INDEX: 42.87 KG/M2

## 2024-09-09 DIAGNOSIS — Z12.31 VISIT FOR SCREENING MAMMOGRAM: ICD-10-CM

## 2024-09-09 PROCEDURE — 77063 BREAST TOMOSYNTHESIS BI: CPT

## 2024-12-10 ENCOUNTER — OFFICE VISIT (OUTPATIENT)
Facility: CLINIC | Age: 59
End: 2024-12-10

## 2024-12-10 VITALS
DIASTOLIC BLOOD PRESSURE: 80 MMHG | RESPIRATION RATE: 16 BRPM | TEMPERATURE: 98 F | WEIGHT: 280.2 LBS | OXYGEN SATURATION: 97 % | HEART RATE: 77 BPM | SYSTOLIC BLOOD PRESSURE: 120 MMHG | BODY MASS INDEX: 42.47 KG/M2 | HEIGHT: 68 IN

## 2024-12-10 DIAGNOSIS — E11.9 CONTROLLED TYPE 2 DIABETES MELLITUS WITHOUT COMPLICATION, WITHOUT LONG-TERM CURRENT USE OF INSULIN (HCC): ICD-10-CM

## 2024-12-10 DIAGNOSIS — E78.1 HYPERTRIGLYCERIDEMIA: ICD-10-CM

## 2024-12-10 DIAGNOSIS — E11.9 CONTROLLED TYPE 2 DIABETES MELLITUS WITHOUT COMPLICATION, WITHOUT LONG-TERM CURRENT USE OF INSULIN (HCC): Primary | ICD-10-CM

## 2024-12-10 DIAGNOSIS — J45.20 MILD INTERMITTENT ASTHMA WITHOUT COMPLICATION: ICD-10-CM

## 2024-12-10 DIAGNOSIS — K90.9 INTESTINAL MALABSORPTION, UNSPECIFIED TYPE: ICD-10-CM

## 2024-12-10 DIAGNOSIS — F33.9 RECURRENT DEPRESSION (HCC): ICD-10-CM

## 2024-12-10 DIAGNOSIS — J30.9 ALLERGIC RHINITIS, UNSPECIFIED SEASONALITY, UNSPECIFIED TRIGGER: ICD-10-CM

## 2024-12-10 DIAGNOSIS — I10 PRIMARY HYPERTENSION: ICD-10-CM

## 2024-12-10 DIAGNOSIS — Z98.84 S/P GASTRIC BYPASS: ICD-10-CM

## 2024-12-10 DIAGNOSIS — K21.9 GASTROESOPHAGEAL REFLUX DISEASE WITHOUT ESOPHAGITIS: ICD-10-CM

## 2024-12-10 DIAGNOSIS — F41.9 ANXIETY: ICD-10-CM

## 2024-12-10 DIAGNOSIS — Z23 NEEDS FLU SHOT: ICD-10-CM

## 2024-12-10 DIAGNOSIS — E55.9 VITAMIN D DEFICIENCY: ICD-10-CM

## 2024-12-10 DIAGNOSIS — E78.5 DYSLIPIDEMIA: ICD-10-CM

## 2024-12-10 PROBLEM — E66.01 OBESITY, MORBID: Status: RESOLVED | Noted: 2017-12-21 | Resolved: 2024-12-10

## 2024-12-10 PROBLEM — E78.00 PURE HYPERCHOLESTEROLEMIA: Status: RESOLVED | Noted: 2017-12-21 | Resolved: 2024-12-10

## 2024-12-10 RX ORDER — ALPRAZOLAM 0.5 MG
0.5 TABLET ORAL 3 TIMES DAILY PRN
Qty: 12 TABLET | Refills: 0 | Status: SHIPPED | OUTPATIENT
Start: 2024-12-10 | End: 2025-06-08

## 2024-12-10 RX ORDER — M-VIT,TX,IRON,MINS/CALC/FOLIC 27MG-0.4MG
1 TABLET ORAL DAILY
COMMUNITY

## 2024-12-10 NOTE — PROGRESS NOTES
Toña Garber, 59 y.o.,  female    SUBJECTIVE  Ff-up    DM-  She is currently taking metformin, farxiga and trulicity, following endocrinology Luna rodriguez.   HL- currently on repatha, zetia and vascepa been intolerant to statins. Says vascepa no longer covered by insurance.  Reports father/brother CAD/CABG. False positive stress test 4/2022, cardiac cath 5/2022 normal coronaries. Following cards    Depression/anxiety-she reports about the same, she takes zoloft 150 mg And very occasional use of  taking ativan. 12 pills x 6 months.    HTN- taking norvasc 5 mg, and losartan 100 mg.     GERD- doing well on prilosec.      Asthma- is doing well, no recent exacerbation, She is on advair. On singulair and zyrtec as well for allergies.     Vit d def- she is s/p gastric bypass, says taking 50,000 iu vit d 2 x a week.     Per pt utd with gyne care with dr. Rasmussen's group, per pt UTD    ROS  See HPI, all others negative        Patient Active Problem List   Diagnosis Code    HTN (hypertension) I10    Hypertriglyceridemia E78.1    Allergic rhinitis J30.9    Depression F32.A    S/P gastric bypass Z98.84    Anxiety F41.9    Iron deficiency E61.1    Vitamin D deficiency E55.9    Irregular menses N92.6    Vertigo R42    Onychomycosis B35.1    Impaired glucose tolerance R73.02    Rosacea L71.9    Gastroesophageal reflux disease without esophagitis K21.9    Asthma J45.909    Obesity, morbid (HCC) E66.01    Recurrent depression (HCC) F33.9    Pure hypercholesterolemia E78.00    Controlled type 2 diabetes mellitus without complication, without long-term current use of insulin (HCC) E11.9       Current Outpatient Medications:     Probiotic Product (PROBIOTIC ADVANCED PO), Take by mouth, Disp: , Rfl:     Multiple Vitamins-Minerals (THERAPEUTIC MULTIVITAMIN-MINERALS) tablet, Take 1 tablet by mouth daily, Disp: , Rfl:     aspirin 81 MG chewable tablet, Take 1 tablet by mouth in the morning and at bedtime, Disp: , Rfl:     blood glucose

## 2025-01-08 RX ORDER — MONTELUKAST SODIUM 10 MG/1
10 TABLET ORAL DAILY
Qty: 90 TABLET | Refills: 3 | OUTPATIENT
Start: 2025-01-08

## 2025-01-08 RX ORDER — MONTELUKAST SODIUM 10 MG/1
10 TABLET ORAL DAILY
Qty: 90 TABLET | Refills: 3 | Status: SHIPPED | OUTPATIENT
Start: 2025-01-08

## 2025-01-08 RX ORDER — LOSARTAN POTASSIUM 100 MG/1
100 TABLET ORAL DAILY
Qty: 90 TABLET | Refills: 1 | Status: SHIPPED | OUTPATIENT
Start: 2025-01-08

## 2025-01-14 RX ORDER — ERGOCALCIFEROL 1.25 MG/1
50000 CAPSULE, LIQUID FILLED ORAL
Qty: 24 CAPSULE | Refills: 1 | Status: SHIPPED | OUTPATIENT
Start: 2025-01-16

## 2025-01-14 RX ORDER — ICOSAPENT ETHYL 1 G/1
2 CAPSULE ORAL 2 TIMES DAILY WITH MEALS
Qty: 360 CAPSULE | Refills: 3 | Status: SHIPPED | OUTPATIENT
Start: 2025-01-14

## 2025-01-14 RX ORDER — DAPAGLIFLOZIN 10 MG/1
10 TABLET, FILM COATED ORAL DAILY
Qty: 90 TABLET | Refills: 1 | Status: SHIPPED | OUTPATIENT
Start: 2025-01-14

## 2025-01-14 NOTE — TELEPHONE ENCOUNTER
This patient contacted the office for the following prescriptions to be refilled:    Medication requested :   Requested Prescriptions     Pending Prescriptions Disp Refills    omeprazole (PRILOSEC) 20 MG delayed release capsule 90 capsule 3     Sig: Take 1 capsule by mouth daily    Icosapent Ethyl (VASCEPA) 1 g CAPS capsule 360 capsule 3     Sig: Take 2 capsules by mouth 2 times daily (with meals)    dapagliflozin (FARXIGA) 10 MG tablet 90 tablet 1     Sig: Take 1 tablet by mouth daily    vitamin D (ERGOCALCIFEROL) 1.25 MG (54697 UT) CAPS capsule 24 capsule 3     Sig: Take 1 capsule by mouth Twice a Week        Last Refilled: Prilosec 4/16/2024, Vascepa 4/22/2024, Farxiga and Vit D 7/11/2024    Last Office Visit: 12/10/2024    Next Office Visit: 6/10/2025    Last Labs: 8/14/2024

## 2025-05-13 RX ORDER — AMLODIPINE BESYLATE 5 MG/1
5 TABLET ORAL DAILY
Qty: 90 TABLET | Refills: 3 | Status: CANCELLED | OUTPATIENT
Start: 2025-05-13

## 2025-05-13 RX ORDER — AMLODIPINE BESYLATE 5 MG/1
5 TABLET ORAL DAILY
Qty: 90 TABLET | Refills: 1 | Status: SHIPPED | OUTPATIENT
Start: 2025-05-13

## 2025-05-13 RX ORDER — ERGOCALCIFEROL 1.25 MG/1
50000 CAPSULE, LIQUID FILLED ORAL
Qty: 48 CAPSULE | Refills: 0 | Status: SHIPPED | OUTPATIENT
Start: 2025-05-15

## 2025-05-28 ENCOUNTER — HOSPITAL ENCOUNTER (OUTPATIENT)
Age: 60
Setting detail: SPECIMEN
Discharge: HOME | End: 2025-05-31

## 2025-05-28 LAB — LABCORP SPECIMEN COLLECTION: NORMAL

## 2025-05-29 LAB
25(OH)D3+25(OH)D2 SERPL-MCNC: 63.1 NG/ML (ref 30–100)
ALBUMIN SERPL-MCNC: 4 G/DL (ref 3.8–4.9)
ALBUMIN/CREAT UR: 8 MG/G CREAT (ref 0–29)
ALP SERPL-CCNC: 124 IU/L (ref 44–121)
ALT SERPL-CCNC: 34 IU/L (ref 0–32)
AST SERPL-CCNC: 25 IU/L (ref 0–40)
BASOPHILS # BLD AUTO: 0 X10E3/UL (ref 0–0.2)
BASOPHILS NFR BLD AUTO: 1 %
BILIRUB SERPL-MCNC: 0.5 MG/DL (ref 0–1.2)
BUN SERPL-MCNC: 13 MG/DL (ref 6–24)
BUN/CREAT SERPL: 20 (ref 9–23)
CALCIUM SERPL-MCNC: 9.3 MG/DL (ref 8.7–10.2)
CHLORIDE SERPL-SCNC: 104 MMOL/L (ref 96–106)
CHOLEST SERPL-MCNC: 180 MG/DL (ref 100–199)
CO2 SERPL-SCNC: 22 MMOL/L (ref 20–29)
CREAT SERPL-MCNC: 0.66 MG/DL (ref 0.57–1)
CREAT UR-MCNC: 126.1 MG/DL
EGFRCR SERPLBLD CKD-EPI 2021: 102 ML/MIN/1.73
EOSINOPHIL # BLD AUTO: 0.2 X10E3/UL (ref 0–0.4)
EOSINOPHIL NFR BLD AUTO: 2 %
ERYTHROCYTE [DISTWIDTH] IN BLOOD BY AUTOMATED COUNT: 12.8 % (ref 11.7–15.4)
FOLATE SERPL-MCNC: >20 NG/ML
GLOBULIN SER CALC-MCNC: 1.7 G/DL (ref 1.5–4.5)
GLUCOSE SERPL-MCNC: 112 MG/DL (ref 70–99)
HBA1C MFR BLD: 6.3 % (ref 4.8–5.6)
HCT VFR BLD AUTO: 45.5 % (ref 34–46.6)
HDLC SERPL-MCNC: 48 MG/DL
HGB BLD-MCNC: 14.6 G/DL (ref 11.1–15.9)
IMM GRANULOCYTES # BLD AUTO: 0 X10E3/UL (ref 0–0.1)
IMM GRANULOCYTES NFR BLD AUTO: 0 %
IRON SATN MFR SERPL: 25 % (ref 15–55)
IRON SERPL-MCNC: 91 UG/DL (ref 27–159)
LDLC SERPL CALC-MCNC: 99 MG/DL (ref 0–99)
LYMPHOCYTES # BLD AUTO: 1 X10E3/UL (ref 0.7–3.1)
LYMPHOCYTES NFR BLD AUTO: 13 %
MCH RBC QN AUTO: 30.1 PG (ref 26.6–33)
MCHC RBC AUTO-ENTMCNC: 32.1 G/DL (ref 31.5–35.7)
MCV RBC AUTO: 94 FL (ref 79–97)
MICROALBUMIN UR-MCNC: 10.5 UG/ML
MONOCYTES # BLD AUTO: 0.5 X10E3/UL (ref 0.1–0.9)
MONOCYTES NFR BLD AUTO: 6 %
NEUTROPHILS # BLD AUTO: 6 X10E3/UL (ref 1.4–7)
NEUTROPHILS NFR BLD AUTO: 78 %
PLATELET # BLD AUTO: 261 X10E3/UL (ref 150–450)
POTASSIUM SERPL-SCNC: 4.7 MMOL/L (ref 3.5–5.2)
PROT SERPL-MCNC: 5.7 G/DL (ref 6–8.5)
RBC # BLD AUTO: 4.85 X10E6/UL (ref 3.77–5.28)
SODIUM SERPL-SCNC: 142 MMOL/L (ref 134–144)
TIBC SERPL-MCNC: 366 UG/DL (ref 250–450)
TRIGL SERPL-MCNC: 193 MG/DL (ref 0–149)
UIBC SERPL-MCNC: 275 UG/DL (ref 131–425)
VIT B12 SERPL-MCNC: 1168 PG/ML (ref 232–1245)
VLDLC SERPL CALC-MCNC: 33 MG/DL (ref 5–40)
WBC # BLD AUTO: 7.6 X10E3/UL (ref 3.4–10.8)

## 2025-06-01 LAB — VIT B1 BLD-SCNC: 152.3 NMOL/L (ref 66.5–200)

## 2025-06-02 ENCOUNTER — RESULTS FOLLOW-UP (OUTPATIENT)
Facility: CLINIC | Age: 60
End: 2025-06-02

## 2025-06-02 SDOH — ECONOMIC STABILITY: FOOD INSECURITY: WITHIN THE PAST 12 MONTHS, YOU WORRIED THAT YOUR FOOD WOULD RUN OUT BEFORE YOU GOT MONEY TO BUY MORE.: NEVER TRUE

## 2025-06-02 SDOH — ECONOMIC STABILITY: FOOD INSECURITY: WITHIN THE PAST 12 MONTHS, THE FOOD YOU BOUGHT JUST DIDN'T LAST AND YOU DIDN'T HAVE MONEY TO GET MORE.: NEVER TRUE

## 2025-06-02 SDOH — ECONOMIC STABILITY: INCOME INSECURITY: IN THE LAST 12 MONTHS, WAS THERE A TIME WHEN YOU WERE NOT ABLE TO PAY THE MORTGAGE OR RENT ON TIME?: NO

## 2025-06-02 SDOH — ECONOMIC STABILITY: TRANSPORTATION INSECURITY
IN THE PAST 12 MONTHS, HAS THE LACK OF TRANSPORTATION KEPT YOU FROM MEDICAL APPOINTMENTS OR FROM GETTING MEDICATIONS?: NO

## 2025-06-03 ENCOUNTER — OFFICE VISIT (OUTPATIENT)
Facility: CLINIC | Age: 60
End: 2025-06-03
Payer: COMMERCIAL

## 2025-06-03 VITALS
BODY MASS INDEX: 42.07 KG/M2 | HEART RATE: 76 BPM | RESPIRATION RATE: 16 BRPM | OXYGEN SATURATION: 98 % | HEIGHT: 68 IN | TEMPERATURE: 96.8 F | SYSTOLIC BLOOD PRESSURE: 136 MMHG | DIASTOLIC BLOOD PRESSURE: 80 MMHG | WEIGHT: 277.6 LBS

## 2025-06-03 DIAGNOSIS — E78.5 DYSLIPIDEMIA: ICD-10-CM

## 2025-06-03 DIAGNOSIS — E66.813 OBESITY, CLASS 3 (HCC): ICD-10-CM

## 2025-06-03 DIAGNOSIS — E11.9 CONTROLLED TYPE 2 DIABETES MELLITUS WITHOUT COMPLICATION, WITHOUT LONG-TERM CURRENT USE OF INSULIN (HCC): ICD-10-CM

## 2025-06-03 DIAGNOSIS — Z98.84 S/P GASTRIC BYPASS: ICD-10-CM

## 2025-06-03 DIAGNOSIS — I10 PRIMARY HYPERTENSION: ICD-10-CM

## 2025-06-03 DIAGNOSIS — F41.9 ANXIETY: ICD-10-CM

## 2025-06-03 DIAGNOSIS — K21.9 GASTROESOPHAGEAL REFLUX DISEASE WITHOUT ESOPHAGITIS: ICD-10-CM

## 2025-06-03 DIAGNOSIS — Z12.11 COLON CANCER SCREENING: ICD-10-CM

## 2025-06-03 DIAGNOSIS — J30.9 ALLERGIC RHINITIS, UNSPECIFIED SEASONALITY, UNSPECIFIED TRIGGER: ICD-10-CM

## 2025-06-03 DIAGNOSIS — F33.9 RECURRENT DEPRESSION: ICD-10-CM

## 2025-06-03 DIAGNOSIS — J45.20 MILD INTERMITTENT ASTHMA WITHOUT COMPLICATION: Primary | ICD-10-CM

## 2025-06-03 PROCEDURE — 3079F DIAST BP 80-89 MM HG: CPT | Performed by: FAMILY MEDICINE

## 2025-06-03 PROCEDURE — 3044F HG A1C LEVEL LT 7.0%: CPT | Performed by: FAMILY MEDICINE

## 2025-06-03 PROCEDURE — 99214 OFFICE O/P EST MOD 30 MIN: CPT | Performed by: FAMILY MEDICINE

## 2025-06-03 PROCEDURE — 3075F SYST BP GE 130 - 139MM HG: CPT | Performed by: FAMILY MEDICINE

## 2025-06-03 ASSESSMENT — PATIENT HEALTH QUESTIONNAIRE - PHQ9
SUM OF ALL RESPONSES TO PHQ QUESTIONS 1-9: 10
1. LITTLE INTEREST OR PLEASURE IN DOING THINGS: NEARLY EVERY DAY
2. FEELING DOWN, DEPRESSED OR HOPELESS: MORE THAN HALF THE DAYS
9. THOUGHTS THAT YOU WOULD BE BETTER OFF DEAD, OR OF HURTING YOURSELF: NOT AT ALL
6. FEELING BAD ABOUT YOURSELF - OR THAT YOU ARE A FAILURE OR HAVE LET YOURSELF OR YOUR FAMILY DOWN: SEVERAL DAYS
10. IF YOU CHECKED OFF ANY PROBLEMS, HOW DIFFICULT HAVE THESE PROBLEMS MADE IT FOR YOU TO DO YOUR WORK, TAKE CARE OF THINGS AT HOME, OR GET ALONG WITH OTHER PEOPLE: SOMEWHAT DIFFICULT
7. TROUBLE CONCENTRATING ON THINGS, SUCH AS READING THE NEWSPAPER OR WATCHING TELEVISION: SEVERAL DAYS
5. POOR APPETITE OR OVEREATING: SEVERAL DAYS
4. FEELING TIRED OR HAVING LITTLE ENERGY: SEVERAL DAYS
8. MOVING OR SPEAKING SO SLOWLY THAT OTHER PEOPLE COULD HAVE NOTICED. OR THE OPPOSITE, BEING SO FIGETY OR RESTLESS THAT YOU HAVE BEEN MOVING AROUND A LOT MORE THAN USUAL: NOT AT ALL
SUM OF ALL RESPONSES TO PHQ QUESTIONS 1-9: 10
3. TROUBLE FALLING OR STAYING ASLEEP: SEVERAL DAYS
SUM OF ALL RESPONSES TO PHQ QUESTIONS 1-9: 10
SUM OF ALL RESPONSES TO PHQ QUESTIONS 1-9: 10

## 2025-06-03 NOTE — PROGRESS NOTES
Have you been to the ER, urgent care clinic since your last visit?  Hospitalized since your last visit?   NO    Have you seen or consulted any other health care providers outside our system since your last visit?   YES - When: approximately 1 months ago.  Where and Why: dr. Viveros .      “Have you had a colorectal cancer screening such as a colonoscopy/FIT/Cologuard?    NO    Date of last Colonoscopy: 2/24/2016  No cologuard on file  Date of last FIT: 12/15/2023   No flexible sigmoidoscopy on file          
albuterol sulfate HFA (PROVENTIL;VENTOLIN;PROAIR) 108 (90 Base) MCG/ACT inhaler, Inhale 1-2 puffs into the lungs every 4 hours as needed, Disp: , Rfl:     dulaglutide (TRULICITY) 1.5 MG/0.5ML SC injection, ceived the following from [facility]: Outside name: Trulicity 1.5 mg/0.5 mL sub-q pen, Disp: , Rfl:     Icosapent Ethyl (VASCEPA) 1 g CAPS capsule, Take 2 capsules by mouth 2 times daily (with meals) (Patient not taking: Reported on 6/3/2025), Disp: 360 capsule, Rfl: 3    LOSARTAN POTASSIUM PO, Losartan Potassium, Disp: , Rfl:     OBJECTIVE    Physical Exam:   /80   Pulse 76   Temp 96.8 °F (36 °C) (Temporal)   Resp 16   Ht 1.727 m (5' 8\")   Wt 125.9 kg (277 lb 9.6 oz)   SpO2 98%   BMI 42.21 kg/m²       General: alert, obese,  in no apparent distress or pain  HEENT: eac patent, tm intact, throat and pharynx clear  CVS: normal rate, regular rhythm, distinct S1 and S2  Lungs:clear to ausculation bilaterally, no crackles, wheezing or rhonchi noted  Feet: no lesions, normal monofilament  Psych:  mood and affect normal  CMP:   Lab Results   Component Value Date/Time     05/28/2025 12:00 AM    K 4.7 05/28/2025 12:00 AM     05/28/2025 12:00 AM    CO2 22 05/28/2025 12:00 AM    BUN 13 05/28/2025 12:00 AM    CREATININE 0.66 05/28/2025 12:00 AM    GLUCOSE 112 05/28/2025 12:00 AM    CALCIUM 9.3 05/28/2025 12:00 AM    LABALBU 9.8 02/22/2024 12:00 AM    BILITOT 0.5 05/28/2025 12:00 AM    AST 25 05/28/2025 12:00 AM    ALT 34 05/28/2025 12:00 AM        CBC:   Lab Results   Component Value Date/Time    WBC 7.6 05/28/2025 12:00 AM    RBC 4.85 05/28/2025 12:00 AM    HGB 14.6 05/28/2025 12:00 AM    HCT 45.5 05/28/2025 12:00 AM    MCV 94 05/28/2025 12:00 AM    MCH 30.1 05/28/2025 12:00 AM    MCHC 32.1 05/28/2025 12:00 AM    RDW 12.8 05/28/2025 12:00 AM     05/28/2025 12:00 AM        Lipids   Lab Results   Component Value Date/Time    CHOL 180 05/28/2025 12:00 AM    TRIG 193 05/28/2025

## 2025-06-17 RX ORDER — FLUTICASONE PROPIONATE 50 MCG
2 SPRAY, SUSPENSION (ML) NASAL DAILY
Qty: 16 G | Refills: 11 | Status: SHIPPED | OUTPATIENT
Start: 2025-06-17

## 2025-06-17 RX ORDER — METFORMIN HYDROCHLORIDE 500 MG/1
2000 TABLET, EXTENDED RELEASE ORAL
Qty: 360 TABLET | Refills: 1 | Status: SHIPPED | OUTPATIENT
Start: 2025-06-17

## 2025-07-14 NOTE — TELEPHONE ENCOUNTER
This patient contacted the office for the following prescriptions to be refilled:    Medication requested :   Requested Prescriptions     Pending Prescriptions Disp Refills    omeprazole (PRILOSEC) 20 MG delayed release capsule 90 capsule 3     Sig: Take 1 capsule by mouth daily    dapagliflozin (FARXIGA) 10 MG tablet 90 tablet 1     Sig: Take 1 tablet by mouth daily        Last Refilled: 1/14/2025    Last Office Visit: 6/3/2025    Next Office Visit: 12/3/2025    Last Labs: 5/28/2025

## 2025-07-15 RX ORDER — DAPAGLIFLOZIN 10 MG/1
10 TABLET, FILM COATED ORAL DAILY
Qty: 90 TABLET | Refills: 1 | Status: SHIPPED | OUTPATIENT
Start: 2025-07-15

## 2025-07-15 RX ORDER — DAPAGLIFLOZIN 10 MG/1
10 TABLET, FILM COATED ORAL DAILY
Qty: 90 TABLET | Refills: 1 | Status: SHIPPED | OUTPATIENT
Start: 2025-07-15 | End: 2025-07-15 | Stop reason: SDUPTHER

## 2025-07-15 RX ORDER — OMEPRAZOLE 20 MG/1
20 CAPSULE, DELAYED RELEASE ORAL DAILY
Qty: 90 CAPSULE | Refills: 3 | Status: SHIPPED | OUTPATIENT
Start: 2025-07-15

## 2025-08-10 DIAGNOSIS — F33.9 RECURRENT DEPRESSION: ICD-10-CM

## 2025-08-11 DIAGNOSIS — F33.9 RECURRENT DEPRESSION: ICD-10-CM

## 2025-08-11 RX ORDER — ERGOCALCIFEROL 1.25 MG/1
CAPSULE, LIQUID FILLED ORAL
Qty: 48 CAPSULE | Refills: 1 | Status: SHIPPED | OUTPATIENT
Start: 2025-08-11

## 2025-08-11 RX ORDER — SERTRALINE HYDROCHLORIDE 100 MG/1
TABLET, FILM COATED ORAL
Qty: 135 TABLET | Refills: 1 | Status: SHIPPED | OUTPATIENT
Start: 2025-08-11

## 2025-08-12 RX ORDER — ERGOCALCIFEROL 1.25 MG/1
50000 CAPSULE, LIQUID FILLED ORAL
Qty: 24 CAPSULE | Refills: 1 | Status: SHIPPED | OUTPATIENT
Start: 2025-08-14

## 2025-08-12 RX ORDER — SERTRALINE HYDROCHLORIDE 100 MG/1
150 TABLET, FILM COATED ORAL DAILY
Qty: 135 TABLET | Refills: 1 | Status: SHIPPED | OUTPATIENT
Start: 2025-08-12

## 2025-08-12 RX ORDER — AMLODIPINE BESYLATE 5 MG/1
5 TABLET ORAL DAILY
Qty: 90 TABLET | Refills: 1 | Status: SHIPPED | OUTPATIENT
Start: 2025-08-12

## 2025-08-19 LAB — HEMOCCULT STL QL IA: NEGATIVE
